# Patient Record
Sex: FEMALE | Race: WHITE | NOT HISPANIC OR LATINO | Employment: OTHER | URBAN - METROPOLITAN AREA
[De-identification: names, ages, dates, MRNs, and addresses within clinical notes are randomized per-mention and may not be internally consistent; named-entity substitution may affect disease eponyms.]

---

## 2017-01-17 ENCOUNTER — OFFICE VISIT (OUTPATIENT)
Dept: FAMILY MEDICINE | Facility: CLINIC | Age: 77
End: 2017-01-17
Payer: COMMERCIAL

## 2017-01-17 VITALS
HEART RATE: 96 BPM | DIASTOLIC BLOOD PRESSURE: 80 MMHG | TEMPERATURE: 96.8 F | OXYGEN SATURATION: 97 % | BODY MASS INDEX: 28.98 KG/M2 | WEIGHT: 165 LBS | SYSTOLIC BLOOD PRESSURE: 138 MMHG

## 2017-01-17 DIAGNOSIS — R32 UNSPECIFIED URINARY INCONTINENCE: Primary | ICD-10-CM

## 2017-01-17 DIAGNOSIS — R30.0 DYSURIA: ICD-10-CM

## 2017-01-17 DIAGNOSIS — R82.90 NONSPECIFIC FINDING ON EXAMINATION OF URINE: ICD-10-CM

## 2017-01-17 LAB
ALBUMIN UR-MCNC: NEGATIVE MG/DL
APPEARANCE UR: CLEAR
BACTERIA #/AREA URNS HPF: ABNORMAL /HPF
BILIRUB UR QL STRIP: NEGATIVE
COLOR UR AUTO: YELLOW
GLUCOSE UR STRIP-MCNC: NEGATIVE MG/DL
HGB UR QL STRIP: NEGATIVE
KETONES UR STRIP-MCNC: NEGATIVE MG/DL
LEUKOCYTE ESTERASE UR QL STRIP: NEGATIVE
NITRATE UR QL: POSITIVE
PH UR STRIP: 6.5 PH (ref 5–7)
RBC #/AREA URNS AUTO: ABNORMAL /HPF (ref 0–2)
SP GR UR STRIP: 1.01 (ref 1–1.03)
URN SPEC COLLECT METH UR: ABNORMAL
URNS CMNT MICRO: ABNORMAL
UROBILINOGEN UR STRIP-ACNC: 0.2 EU/DL (ref 0.2–1)
WBC #/AREA URNS AUTO: ABNORMAL /HPF (ref 0–2)

## 2017-01-17 PROCEDURE — 87088 URINE BACTERIA CULTURE: CPT | Performed by: PHYSICIAN ASSISTANT

## 2017-01-17 PROCEDURE — 99213 OFFICE O/P EST LOW 20 MIN: CPT | Performed by: PHYSICIAN ASSISTANT

## 2017-01-17 PROCEDURE — 87086 URINE CULTURE/COLONY COUNT: CPT | Performed by: PHYSICIAN ASSISTANT

## 2017-01-17 PROCEDURE — 81001 URINALYSIS AUTO W/SCOPE: CPT | Performed by: PHYSICIAN ASSISTANT

## 2017-01-17 PROCEDURE — 87186 SC STD MICRODIL/AGAR DIL: CPT | Performed by: PHYSICIAN ASSISTANT

## 2017-01-17 RX ORDER — SULFAMETHOXAZOLE/TRIMETHOPRIM 800-160 MG
1 TABLET ORAL 2 TIMES DAILY
Qty: 6 TABLET | Refills: 0 | Status: SHIPPED | OUTPATIENT
Start: 2017-01-17 | End: 2017-01-20

## 2017-01-17 NOTE — MR AVS SNAPSHOT
After Visit Summary   1/17/2017    Claire Moore    MRN: [id number]           Patient Information     Date Of Birth          1940        Visit Information        Provider Department      1/17/2017 9:50 AM Kehr, Kristen M, PA-C [facility]        Today's Diagnoses     Dysuria    -  1     Nonspecific finding on examination of urine         Female bladder prolapse            Follow-ups after your visit        Additional Services     UROLOGY ADULT REFERRAL       Your provider has referred you to: [facility]: [facility] [place] [phone]   [email or web address]    Please be aware that coverage of these services is subject to the terms and limitations of your health insurance plan.  Call member services at your health plan with any benefit or coverage questions.      Please bring the following with you to your appointment:    (1) Any X-Rays, CTs or MRIs which have been performed.  Contact the facility where they were done to arrange for  prior to your scheduled appointment.    (2) List of current medications  (3) This referral request   (4) Any documents/labs given to you for this referral                  Your next 10 appointments already scheduled     Jun 05, 2017  9:00 AM   New Visit with Shemar Jesus MD   [facility]) [address] [address] [phone]              Who to contact     If you have questions or need follow up information about today's clinic visit or your schedule please contact [facility] directly at [phone].  Normal or non-critical lab and imaging results will be communicated to you by MyChart, letter or phone within 4 business days after the clinic has received the results. If you do not hear from us within 7 days, please contact the clinic through MyChart or phone. If you have a critical or abnormal lab result, we will notify you by phone as  "soon as possible.  Submit refill requests through [employer] or call your pharmacy and they will forward the refill request to us. Please allow 3 business days for your refill to be completed.          Additional Information About Your Visit        [employer] Information     [employer] lets you send messages to your doctor, view your test results, renew your prescriptions, schedule appointments and more. To sign up, go to [email or web address] . Click on \"Log in\" on the left side of the screen, which will take you to the Welcome page. Then click on \"Sign up Now\" on the right side of the page.     You will be asked to enter the access code listed below, as well as some personal information. Please follow the directions to create your username and password.     Your access code is: [email or web address]  Expires: 2017 10:07 AM     Your access code will  in 90 days. If you need help or a new code, please call your [facility] clinic or [phone].        Care EveryWhere ID     This is your Care EveryWhere ID. This could be used by other organizations to access your [facility] medical records  [id number]        Your Vitals Were     Pulse Temperature Pulse Oximetry             96 96.8  F (36  C) (Oral) 97%          Blood Pressure from Last 3 Encounters:   17 138/80   16 134/82   06/10/15 156/80    Weight from Last 3 Encounters:   17 165 lb (74.844 kg)   16 165 lb (74.844 kg)   06/10/15 158 lb (71.668 kg)              We Performed the Following     UA reflex to Microscopic and Culture     Urine Culture Aerobic Bacterial     Urine Microscopic     UROLOGY ADULT REFERRAL          Today's Medication Changes          These changes are accurate as of: 17 10:07 AM.  If you have any questions, ask your nurse or doctor.               Start taking these medicines.        Dose/Directions    sulfamethoxazole-trimethoprim 800-160 MG per tablet   Commonly known as:  BACTRIM DS/SEPTRA DS   Used for:  Dysuria "   Started by:  Kehr, Kristen M, PA-C        Dose:  1 tablet   Take 1 tablet by mouth 2 times daily for 3 days   Quantity:  6 tablet   Refills:  0            Where to get your medicines      These medications were sent to [id number]  [place] - [address]  [address], [place] [address]     Phone:  [phone]    - sulfamethoxazole-trimethoprim 800-160 MG per tablet             Primary Care Provider Office Phone # Fax #    Kristen M Kehr, PA-C [phone]       [facility] [address] [facility] [address]        Thank you!     Thank you for choosing [facility]  for your care. Our goal is always to provide you with excellent care. Hearing back from our patients is one way we can continue to improve our services. Please take a few minutes to complete the written survey that you may receive in the mail after your visit with us. Thank you!             Your Updated Medication List - Protect others around you: Learn how to safely use, store and throw away your medicines at www.disposemymeds.org.          This list is accurate as of: 1/17/17 10:07 AM.  Always use your most recent med list.                   Brand Name Dispense Instructions for use    aspirin 81 MG tablet      Take  by mouth daily.       CALCIUM 500 PO      1 per day       cholecalciferol 1000 UNIT tablet    vitamin D     Take 1 tablet by mouth daily.       levothyroxine 75 MCG tablet    SYNTHROID    90 tablet    Take 1 tablet (75 mcg) by mouth daily       lisinopril-hydrochlorothiazide 20-25 MG per tablet    PRINZIDE/ZESTORETIC    90 tablet    Take 0.5 tablets by mouth daily APPT NEEDED FOR FURTHER REFILLS       MULTI-DAY VITAMINS PO      Take  by mouth. 1 tab daily       simvastatin 20 MG tablet    ZOCOR    90 tablet    Take 1 tablet (20 mg) by mouth daily       sulfamethoxazole-trimethoprim 800-160 MG per tablet    BACTRIM DS/SEPTRA DS    6 tablet    Take 1 tablet by mouth 2  times daily for 3 days

## 2017-01-17 NOTE — NURSING NOTE
"Chief Complaint   Patient presents with     Bladder Problems       Initial /99 mmHg  Pulse 96  Temp(Src) 96.8  F (36  C) (Oral)  Wt 165 lb (74.844 kg)  SpO2 97% Estimated body mass index is 28.98 kg/(m^2) as calculated from the following:    Height as of 5/18/16: 5' 3.25\" (1.607 m).    Weight as of this encounter: 165 lb (74.844 kg)..  BP completed using cuff size: emeterio SILVA MA    "

## 2017-01-17 NOTE — PROGRESS NOTES
SUBJECTIVE:                                                    Claire Moore is a 76 year old female who presents to clinic today for the following health issues:    Claire has been noticing changes with her bladder. She feels a pressure when she is shoveling and having urinary leakage. She denies painful urination. She feels this has been ongoing for years, but getting worse.   She actually thinks she feels her bladder prolapsed after lifting.         Problem list and histories reviewed & adjusted, as indicated.  Additional history: as documented    Patient Active Problem List   Diagnosis     Hypothyroidism     Hypertension goal BP (blood pressure) < 140/90     Hyperlipidemia LDL goal <130     Advanced directives, counseling/discussion     DJD (degenerative joint disease)     Pseudophakia, od     Hypothyroidism, unspecified hypothyroidism type     Blind hypotensive eye, left     History of retinal detachment, os     Posterior vitreous detachment, right     Past Surgical History   Procedure Laterality Date     Tonsillectomy       Retinal reattachment  remotely     right eye     Phacoemulsification with standard intraocular lens implant  5/2015     right eye     Cataract iol, rt/lt         Social History   Substance Use Topics     Smoking status: Former Smoker     Types: Cigarettes     Smokeless tobacco: Never Used     Alcohol Use: Yes      Comment: occ     Family History   Problem Relation Age of Onset     Alzheimer Disease Mother      OSTEOPOROSIS Mother      Hypertension Mother      Prostate Cancer Father      OSTEOPOROSIS Maternal Grandmother      CANCER Brother      Eye Disorder Brother      Eye Disorder Sister      Eye Disorder Son      Eye Disorder Brother      Eye Disorder Brother      CEREBROVASCULAR DISEASE Paternal Grandmother      DIABETES No family hx of      Thyroid Disease No family hx of      Glaucoma No family hx of      Macular Degeneration No family hx of          No Known  Allergies    ROS:  Constitutional, HEENT, cardiovascular, pulmonary, gi and gu systems are negative, except as otherwise noted.    OBJECTIVE:                                                    /80 mmHg  Pulse 96  Temp(Src) 96.8  F (36  C) (Oral)  Wt 165 lb (74.844 kg)  SpO2 97%  Body mass index is 28.98 kg/(m^2).  GENERAL: healthy, alert and no distress  PSYCH: mentation appears normal, affect normal/bright    Diagnostic Test Results:  Results for orders placed or performed in visit on 01/17/17 (from the past 24 hour(s))   UA reflex to Microscopic and Culture   Result Value Ref Range    Color Urine Yellow     Appearance Urine Clear     Glucose Urine Negative NEG mg/dL    Bilirubin Urine Negative NEG    Ketones Urine Negative NEG mg/dL    Specific Gravity Urine 1.010 1.003 - 1.035    Blood Urine Negative NEG    pH Urine 6.5 5.0 - 7.0 pH    Protein Albumin Urine Negative NEG mg/dL    Urobilinogen Urine 0.2 0.2 - 1.0 EU/dL    Nitrite Urine Positive (A) NEG    Leukocyte Esterase Urine Negative NEG    Source Midstream Urine    Urine Microscopic   Result Value Ref Range    WBC Urine O - 2 0 - 2 /HPF    RBC Urine O - 2 0 - 2 /HPF    Bacteria Urine Many (A) NEG /HPF    Comment Urine       Urine was tested unconcentrated because <10 ml was received.        ASSESSMENT/PLAN:                                                      1. Unspecified urinary incontinence  2. Dysuria  UA is positive today. Treat with Bactrim DS x 3 days. Culture also pending.   Ongoing issues with her bladder, she most likely needs consultation for a sling procedure in the future.   Referral given.   - UA reflex to Microscopic and Culture  - Urine Microscopic  - sulfamethoxazole-trimethoprim (BACTRIM DS/SEPTRA DS) 800-160 MG per tablet; Take 1 tablet by mouth 2 times daily for 3 days  Dispense: 6 tablet; Refill: 0  - UROLOGY ADULT REFERRAL    3. Nonspecific finding on examination of urine  - Urine Culture Aerobic Bacterial    Kristen M. Kehr,  JAMIE  [facility]

## 2017-01-17 NOTE — Clinical Note
[facility]  [address] [facility] [address] [phone]        January 23, 2017    Claire Moore  [address]  [address] [place] [address]            Dear Claire,      Your urine tests showed a bacteria that should be covered by the antibiotic that was chosen. Please see your provider if your symptoms persist.        If you have any questions or concerns, please call myself or my nurse at [phone].    Sincerely,    Kristen Kehr, PA-C/ms    Results for orders placed or performed in visit on 01/17/17   UA reflex to Microscopic and Culture   Result Value Ref Range    Color Urine Yellow     Appearance Urine Clear     Glucose Urine Negative NEG mg/dL    Bilirubin Urine Negative NEG    Ketones Urine Negative NEG mg/dL    Specific Gravity Urine 1.010 1.003 - 1.035    Blood Urine Negative NEG    pH Urine 6.5 5.0 - 7.0 pH    Protein Albumin Urine Negative NEG mg/dL    Urobilinogen Urine 0.2 0.2 - 1.0 EU/dL    Nitrite Urine Positive (A) NEG    Leukocyte Esterase Urine Negative NEG    Source Midstream Urine    Urine Microscopic   Result Value Ref Range    WBC Urine O - 2 0 - 2 /HPF    RBC Urine O - 2 0 - 2 /HPF    Bacteria Urine Many (A) NEG /HPF    Comment Urine       Urine was tested unconcentrated because <10 ml was received.   Urine Culture Aerobic Bacterial   Result Value Ref Range    Specimen Description Midstream Urine     Culture Micro >100,000 colonies/mL Escherichia coli (A)     Micro Report Status FINAL 01/22/2017     Organism: >100,000 colonies/mL Escherichia coli        Susceptibility    >100,000 colonies/ml escherichia coli (gurvinder) -  (no method available)     AMPICILLIN >32.0 Resistant  ug/mL     CEFAZOLIN Value in next row  ug/mL      8 SusceptibleCefazolin GURVINDER breakpoints are for the treatment of uncomplicated urinary tract infections.  For the treatment of systemic infections, please contact the laboratory for additional testing.     CEFOXITIN Value in next row  ug/mL       8 SusceptibleCefazolin RAQEUL breakpoints are for the treatment of uncomplicated urinary tract infections.  For the treatment of systemic infections, please contact the laboratory for additional testing.     CEFTAZIDIME Value in next row  ug/mL      8 SusceptibleCefazolin RAQUEL breakpoints are for the treatment of uncomplicated urinary tract infections.  For the treatment of systemic infections, please contact the laboratory for additional testing.     CEFTRIAXONE Value in next row  ug/mL      8 SusceptibleCefazolin RAQUEL breakpoints are for the treatment of uncomplicated urinary tract infections.  For the treatment of systemic infections, please contact the laboratory for additional testing.     CIPROFLOXACIN Value in next row  ug/mL      8 SusceptibleCefazolin RAQUEL breakpoints are for the treatment of uncomplicated urinary tract infections.  For the treatment of systemic infections, please contact the laboratory for additional testing.     GENTAMICIN Value in next row  ug/mL      8 SusceptibleCefazolin RAQUEL breakpoints are for the treatment of uncomplicated urinary tract infections.  For the treatment of systemic infections, please contact the laboratory for additional testing.     LEVOFLOXACIN Value in next row  ug/mL      8 SusceptibleCefazolin RAQUEL breakpoints are for the treatment of uncomplicated urinary tract infections.  For the treatment of systemic infections, please contact the laboratory for additional testing.     NITROFURANTOIN Value in next row  ug/mL      8 SusceptibleCefazolin RAQUEL breakpoints are for the treatment of uncomplicated urinary tract infections.  For the treatment of systemic infections, please contact the laboratory for additional testing.     TOBRAMYCIN Value in next row  ug/mL      8 SusceptibleCefazolin RAQUEL breakpoints are for the treatment of uncomplicated urinary tract infections.  For the treatment of systemic infections, please contact the laboratory for additional testing.      Trimethoprim/Sulfa Value in next row  ug/mL      8 SusceptibleCefazolin RAQUEL breakpoints are for the treatment of uncomplicated urinary tract infections.  For the treatment of systemic infections, please contact the laboratory for additional testing.     AMPICILLIN/SULBACTAM Value in next row  ug/mL      8 SusceptibleCefazolin RAQUEL breakpoints are for the treatment of uncomplicated urinary tract infections.  For the treatment of systemic infections, please contact the laboratory for additional testing.     Piperacillin/Tazo Value in next row  ug/mL      8 SusceptibleCefazolin RAQUEL breakpoints are for the treatment of uncomplicated urinary tract infections.  For the treatment of systemic infections, please contact the laboratory for additional testing.     CEFEPIME Value in next row  ug/mL      8 SusceptibleCefazolin RAQUEL breakpoints are for the treatment of uncomplicated urinary tract infections.  For the treatment of systemic infections, please contact the laboratory for additional testing.

## 2017-01-22 LAB
BACTERIA SPEC CULT: ABNORMAL
MICRO REPORT STATUS: ABNORMAL
MICROORGANISM SPEC CULT: ABNORMAL
SPECIMEN SOURCE: ABNORMAL

## 2017-01-24 ENCOUNTER — OFFICE VISIT (OUTPATIENT)
Dept: UROLOGY | Facility: CLINIC | Age: 77
End: 2017-01-24
Payer: COMMERCIAL

## 2017-01-24 VITALS — HEART RATE: 82 BPM | DIASTOLIC BLOOD PRESSURE: 98 MMHG | RESPIRATION RATE: 12 BRPM | SYSTOLIC BLOOD PRESSURE: 180 MMHG

## 2017-01-24 DIAGNOSIS — I10 HYPERTENSION GOAL BP (BLOOD PRESSURE) < 140/90: ICD-10-CM

## 2017-01-24 DIAGNOSIS — R32 URINARY INCONTINENCE, UNSPECIFIED TYPE: ICD-10-CM

## 2017-01-24 DIAGNOSIS — N81.3 COMPLETE UTERINE PROLAPSE: Primary | ICD-10-CM

## 2017-01-24 LAB
ALBUMIN UR-MCNC: NEGATIVE MG/DL
APPEARANCE UR: CLEAR
BILIRUB UR QL STRIP: NEGATIVE
COLOR UR AUTO: YELLOW
GLUCOSE UR STRIP-MCNC: NEGATIVE MG/DL
HGB UR QL STRIP: ABNORMAL
KETONES UR STRIP-MCNC: NEGATIVE MG/DL
LEUKOCYTE ESTERASE UR QL STRIP: ABNORMAL
NITRATE UR QL: NEGATIVE
NON-SQ EPI CELLS #/AREA URNS LPF: ABNORMAL /LPF
PH UR STRIP: 7 PH (ref 5–7)
RBC #/AREA URNS AUTO: ABNORMAL /HPF (ref 0–2)
SP GR UR STRIP: 1.01 (ref 1–1.03)
URN SPEC COLLECT METH UR: ABNORMAL
UROBILINOGEN UR STRIP-ACNC: 0.2 EU/DL (ref 0.2–1)
WBC #/AREA URNS AUTO: ABNORMAL /HPF (ref 0–2)

## 2017-01-24 PROCEDURE — 99204 OFFICE O/P NEW MOD 45 MIN: CPT | Mod: 25 | Performed by: UROLOGY

## 2017-01-24 PROCEDURE — 81001 URINALYSIS AUTO W/SCOPE: CPT | Performed by: UROLOGY

## 2017-01-24 PROCEDURE — 51798 US URINE CAPACITY MEASURE: CPT | Performed by: UROLOGY

## 2017-01-24 NOTE — PATIENT INSTRUCTIONS
Your next cystoscopy is scheduled 02/14/2017 @ 2:00pm Please call  if you need to reschedule this appointment.    Cystoscopy  Cystoscopy is a procedure that lets your doctor look directly inside your urethra and bladder. It can be used to:    Help diagnose a problem with your urethra, bladder, or kidneys.    Take a sample (biopsy) of bladder or urethral tissue.    Treat certain problems (such as removing kidney stones).    Place a stent to bypass an obstruction.    Take special X-rays of the kidneys.  Based on the findings, your doctor may recommend other tests or treatments.  What is a cystoscope?  A cystoscope is a telescope-like instrument that contains lenses and fiberoptics (small glass wires that make bright light). The cystoscope may be straight and rigid, or flexible to bend around curves in the urethra. The doctor may look directly into the cystoscope, or project the image onto a monitor.  Getting ready    Ask your doctor if you should stop taking any medications prior to the procedure.    Ask whether you should avoid eating or drinking anything after midnight before the procedure.    Follow any other instructions your doctor gives you.  Tell your doctor before the exam if you:    Take any medications, such as aspirin or blood thinners    Have allergies to any medications    Are pregnant   The procedure  Cystoscopy is done in the doctor s office or hospital. The doctor and a nurse are present during the procedure. It takes only a few minutes, longer if a biopsy, X-ray, or treatment needs to be done.  During the procedure:    You lie on an exam table on your back, knees bent and legs apart. You are covered with a drape.    Your urethra and the area around it are washed. Anesthetic jelly may be applied to numb the urethra. Other pain medication is usually not needed. In some cases, you may be offered a mild sedative to help you relax. If a more extensive procedure is to be done, such as a biopsy  or kidney stone removal, general anesthesia may be needed.    The cystoscope is inserted. A sterile fluid is put into the bladder to expand it. You may feel pressure from this fluid.    When the procedure is done, the cystoscope is removed.  After the procedure  If you had a sedative, general anesthesia, or spinal anesthesia, you must have someone drive you home. Once you re home:    Drink plenty of fluids.    You may have burning or light bleeding when you urinate--this is normal.    Medications may be prescribed to ease any discomfort or prevent infection. Take these as directed.    Call your doctor if you have heavy bleeding or blood clots, burning that lasts more than a day, a fever over 100 F  (38  C), or trouble urinating.    1082-9246 [employer] [address] [place] [address], [place] [address]. All rights reserved. This information is not intended as a substitute for professional medical care. Always follow your healthcare professional's instructions.

## 2017-01-24 NOTE — MR AVS SNAPSHOT
After Visit Summary   1/24/2017    Claire Moore    MRN: [id number]           Patient Information     Date Of Birth          1940        Visit Information        Provider Department      1/24/2017 3:45 PM Rene Mitchell MD [facility]        Today's Diagnoses     Complete uterine prolapse    -  1     Urinary incontinence, unspecified type         Hypertension goal BP (blood pressure) < 140/90           Care Instructions    Your next cystoscopy is scheduled 02/14/2017 @ 2:00pm Please call  if you need to reschedule this appointment.    Cystoscopy  Cystoscopy is a procedure that lets your doctor look directly inside your urethra and bladder. It can be used to:    Help diagnose a problem with your urethra, bladder, or kidneys.    Take a sample (biopsy) of bladder or urethral tissue.    Treat certain problems (such as removing kidney stones).    Place a stent to bypass an obstruction.    Take special X-rays of the kidneys.  Based on the findings, your doctor may recommend other tests or treatments.  What is a cystoscope?  A cystoscope is a telescope-like instrument that contains lenses and fiberoptics (small glass wires that make bright light). The cystoscope may be straight and rigid, or flexible to bend around curves in the urethra. The doctor may look directly into the cystoscope, or project the image onto a monitor.  Getting ready    Ask your doctor if you should stop taking any medications prior to the procedure.    Ask whether you should avoid eating or drinking anything after midnight before the procedure.    Follow any other instructions your doctor gives you.  Tell your doctor before the exam if you:    Take any medications, such as aspirin or blood thinners    Have allergies to any medications    Are pregnant   The procedure  Cystoscopy is done in the doctor s office or hospital. The doctor and a nurse are present during the procedure. It takes only a few  minutes, longer if a biopsy, X-ray, or treatment needs to be done.  During the procedure:    You lie on an exam table on your back, knees bent and legs apart. You are covered with a drape.    Your urethra and the area around it are washed. Anesthetic jelly may be applied to numb the urethra. Other pain medication is usually not needed. In some cases, you may be offered a mild sedative to help you relax. If a more extensive procedure is to be done, such as a biopsy or kidney stone removal, general anesthesia may be needed.    The cystoscope is inserted. A sterile fluid is put into the bladder to expand it. You may feel pressure from this fluid.    When the procedure is done, the cystoscope is removed.  After the procedure  If you had a sedative, general anesthesia, or spinal anesthesia, you must have someone drive you home. Once you re home:    Drink plenty of fluids.    You may have burning or light bleeding when you urinate--this is normal.    Medications may be prescribed to ease any discomfort or prevent infection. Take these as directed.    Call your doctor if you have heavy bleeding or blood clots, burning that lasts more than a day, a fever over 100 F  (38  C), or trouble urinating.    1632-3931 [employer]. [address] [place] [address], [place] [address]. All rights reserved. This information is not intended as a substitute for professional medical care. Always follow your healthcare professional's instructions.              Follow-ups after your visit        Your next 10 appointments already scheduled     Feb 14, 2017  2:00 PM   Return Visit with Rene Mitchell MD, [place] CYSTO PROC ROOM   [facility] [place] [facility]    [address]  [place] [address]   [phone]            Jun 05, 2017  9:00 AM   New Visit with Shemar Jesus MD   [facility] [place] ([facility])    [address]  [place] [address]   [phone]          "     Who to contact     If you have questions or need follow up information about today's clinic visit or your schedule please contact [facility] directly at [phone].  Normal or non-critical lab and imaging results will be communicated to you by MyChart, letter or phone within 4 business days after the clinic has received the results. If you do not hear from us within 7 days, please contact the clinic through MyChart or phone. If you have a critical or abnormal lab result, we will notify you by phone as soon as possible.  Submit refill requests through [employer] or call your pharmacy and they will forward the refill request to us. Please allow 3 business days for your refill to be completed.          Additional Information About Your Visit        [employer] [facility] Information     [employer] lets you send messages to your doctor, view your test results, renew your prescriptions, schedule appointments and more. To sign up, go to [email or web address] . Click on \"Log in\" on the left side of the screen, which will take you to the Welcome page. Then click on \"Sign up Now\" on the right side of the page.     You will be asked to enter the access code listed below, as well as some personal information. Please follow the directions to create your username and password.     Your access code is: [email or web address]  Expires: 2017 10:07 AM     Your access code will  in 90 days. If you need help or a new code, please call your [place] clinic or [phone].        Care EveryWhere ID     This is your Care EveryWhere ID. This could be used by other organizations to access your [place] medical records  [id number]        Your Vitals Were     Pulse Respirations                82 12           Blood Pressure from Last 3 Encounters:   17 180/98   17 138/80   16 134/82    Weight from Last 3 Encounters:   17 74.844 kg (165 lb)   16 74.844 kg (165 lb)   06/10/15 71.668 kg (158 lb)            "   We Performed the Following     Residual Urine By Bladder Scan     UA reflex to Microscopic and Culture     Urine Microscopic        Primary Care Provider Office Phone # Fax #    Kristen M Kehr, PA-C [phone]       [facility] [address] [address] [address]        Thank you!     Thank you for choosing [facility]  for your care. Our goal is always to provide you with excellent care. Hearing back from our patients is one way we can continue to improve our services. Please take a few minutes to complete the written survey that you may receive in the mail after your visit with us. Thank you!             Your Updated Medication List - Protect others around you: Learn how to safely use, store and throw away your medicines at www.disposemymeds.org.          This list is accurate as of: 1/24/17  4:10 PM.  Always use your most recent med list.                   Brand Name Dispense Instructions for use    aspirin 81 MG tablet      Take  by mouth daily.       CALCIUM 500 PO      1 per day       cholecalciferol 1000 UNIT tablet    vitamin D     Take 1 tablet by mouth daily.       levothyroxine 75 MCG tablet    SYNTHROID    90 tablet    Take 1 tablet (75 mcg) by mouth daily       lisinopril-hydrochlorothiazide 20-25 MG per tablet    PRINZIDE/ZESTORETIC    90 tablet    Take 0.5 tablets by mouth daily APPT NEEDED FOR FURTHER REFILLS       MULTI-DAY VITAMINS PO      Take  by mouth. 1 tab daily       simvastatin 20 MG tablet    ZOCOR    90 tablet    Take 1 tablet (20 mg) by mouth daily

## 2017-01-24 NOTE — PROGRESS NOTES
S: See dictated note   Current Outpatient Prescriptions   Medication Sig Dispense Refill     lisinopril-hydrochlorothiazide (PRINZIDE,ZESTORETIC) 20-25 MG per tablet Take 0.5 tablets by mouth daily APPT NEEDED FOR FURTHER REFILLS 90 tablet 1     levothyroxine (SYNTHROID) 75 MCG tablet Take 1 tablet (75 mcg) by mouth daily 90 tablet 3     simvastatin (ZOCOR) 20 MG tablet Take 1 tablet (20 mg) by mouth daily 90 tablet 3     aspirin 81 MG tablet Take  by mouth daily.       Multiple Vitamin (MULTI-DAY VITAMINS PO) Take  by mouth. 1 tab daily       cholecalciferol (VITAMIN D) 1000 UNIT tablet Take 1 tablet by mouth daily.       CALCIUM 500 OR 1 per day        No Known Allergies  Past Medical History   Diagnosis Date     Unspecified visual loss      L eye     Unspecified hypothyroidism      Pure hypercholesterolemia      Osteopenia      Hypertension      Cataract      Retinal detachment      right eye     DJD (degenerative joint disease) 8/14/2012     Past Surgical History   Procedure Laterality Date     Tonsillectomy       Retinal reattachment  remotely     right eye     Phacoemulsification with standard intraocular lens implant  5/2015     right eye     Cataract iol, rt/lt        Family History   Problem Relation Age of Onset     Alzheimer Disease Mother      OSTEOPOROSIS Mother      Hypertension Mother      Prostate Cancer Father      OSTEOPOROSIS Maternal Grandmother      CANCER Brother      Eye Disorder Brother      Eye Disorder Sister      Eye Disorder Son      Eye Disorder Brother      Eye Disorder Brother      CEREBROVASCULAR DISEASE Paternal Grandmother      DIABETES No family hx of      Thyroid Disease No family hx of      Glaucoma No family hx of      Macular Degeneration No family hx of      Social History     Social History     Marital Status:      Spouse Name: N/A     Number of Children: N/A     Years of Education: N/A     Social History Main Topics     Smoking status: Former Smoker     Types:  Cigarettes     Smokeless tobacco: Never Used     Alcohol Use: Yes      Comment: occ     Drug Use: No     Sexual Activity: No     Other Topics Concern     None     Social History Narrative       REVIEW OF SYSTEMS  =================  C: NEGATIVE for fever, chills, change in weight  I: NEGATIVE for worrisome rashes, moles or lesions  E/M: NEGATIVE for ear, mouth and throat problems  R: NEGATIVE for significant cough or SHORTNESS OF BREATH  CV:  NEGATIVE for chest pain, palpitations or peripheral edema  GI: NEGATIVE for nausea, abdominal pain, heartburn, or change in bowel habits  NEURO: NEGATIVE numbness/weakness  : see HPI  PSYCH: NEGATIVE depression/anxiety  LYmph: no new enlarged lymph nodes  Ortho: no new trauma/movements      Physical Exam:  /98 mmHg  Pulse 82  Resp 12   Patient is pleasant, in no acute distress, good general condition.  HEENT:  Normalcephalic, atraumatic  Lung: no evidence of respiratory distress    Abdomen: Soft, nondistended, non tender. No masses. No rebound or guarding.   Exam: complete uterine prolapse.  Bladder scan 185 ml.  Skin: Warm and dry.  No redness.  Neuro: grossly normal  Psych normal mood and affect  Musculoskeletal  moving all extremities    Assessment/Plan:   See dictated note     HTN: Patient to follow up with Primary Care provider regarding elevated blood pressure.

## 2017-01-24 NOTE — PROGRESS NOTES
SUBJECTIVE:  Can Moore is a pleasant 76-year-old  female who was requested to be seen by Kristen Kehr for consultation with regard to the patient's urinary incontinence and uterine prolapse.  She has had some incontinence associated with exertion mainly.  For the last month or two she notices worsening of her prolapses.  It is not completely out and when she is in the bathroom she has to push it in from time to time.  She has no fever, nausea or vomiting, no significant bowel problems.  She is not sexually active.      ASSESSMENT:  Pleasant 76-year-old  female with urinary incontinence and complete uterine prolapse.      RECOMMENDATION:  I discussed treatment options with the patient at length including observation versus pessary usage versus surgical interventions.  We will schedule the patient for cystoscopy.  She is interested in having surgery done.  Since she is not sexually active, colpocleisis would be a good option for patient.         KATYA BENJAMIN MD             D: 2017 16:12   T: 2017 16:48   MT: chalo      Name:     CAN MOORE   MRN:      [id number]        Account:      [id number]   :      1940           Visit Date:   2017      Document: [id number]       cc: Kristen Kehr PA

## 2017-01-24 NOTE — NURSING NOTE
"Chief Complaint   Patient presents with     Consult       Initial /98 mmHg  Pulse 82  Resp 12 Estimated body mass index is 28.98 kg/(m^2) as calculated from the following:    Height as of 5/18/16: 1.607 m (5' 3.25\").    Weight as of 1/17/17: 74.844 kg (165 lb).  BP completed using cuff size: regular    Tanvi Burr RN     Bladder Scan performed. 185 maximum residual urine detected after 3 scans. MD informed     "

## 2017-02-14 ENCOUNTER — OFFICE VISIT (OUTPATIENT)
Dept: UROLOGY | Facility: CLINIC | Age: 77
End: 2017-02-14
Payer: COMMERCIAL

## 2017-02-14 VITALS — SYSTOLIC BLOOD PRESSURE: 184 MMHG | OXYGEN SATURATION: 97 % | DIASTOLIC BLOOD PRESSURE: 88 MMHG | HEART RATE: 90 BPM

## 2017-02-14 DIAGNOSIS — R32 URINARY INCONTINENCE, UNSPECIFIED TYPE: ICD-10-CM

## 2017-02-14 DIAGNOSIS — N81.3 COMPLETE UTERINE PROLAPSE: Primary | ICD-10-CM

## 2017-02-14 DIAGNOSIS — I10 HYPERTENSION GOAL BP (BLOOD PRESSURE) < 140/90: ICD-10-CM

## 2017-02-14 PROCEDURE — 99207 ZZC DROP WITH A PROCEDURE: CPT | Mod: 25 | Performed by: UROLOGY

## 2017-02-14 PROCEDURE — 52000 CYSTOURETHROSCOPY: CPT | Performed by: UROLOGY

## 2017-02-14 NOTE — PROGRESS NOTES
SUBJECTIVE:  Can Moore returns to clinic today for a cystoscopy because of pelvic organ prolapse.      PROCEDURE:  The patient was brought to the cystoscopy suite and placed in dorsal lithotomy position.  She was draped and prepped in the usual surgical fashion.  A flexible cystoscope was then placed into the bladder.  Urethra was unremarkable.  Bladder showed grade I trabeculation.  There was no stone or tumor identified.      ASSESSMENT:  Complete pelvic organ prolapse.      RECOMMENDATION:  I discussed the treatment options with the patient at length.  These include but are not limited to observation, usage of pessary, surgical interventions.  She is interested in having colpocleisis since she is not sexually active anymore.  I described the procedure at length with the patient.  There is a small risk of recurrence and also there is a small risk of incontinence after pelvic organ prolapse repair.  I will also do a mid urethral sling to hopefully prevent incontinence afterwards.  I will schedule this elective procedure in the near future.         KATYA BENJAMIN MD             D: 2017 14:36   T: 2017 14:55   MT: IRENE      Name:     CAN MOORE   MRN:      [id number]        Account:      [id number]   :      1940           Visit Date:   2017      Document: [id number]       cc: Kristen Kehr PA

## 2017-02-14 NOTE — PATIENT INSTRUCTIONS
Stress Urinary Incontinence: Having Midurethral Sling Surgery  To help treat stress urinary incontinence (JOSEFA), your surgeon may do midurethral sling surgery. A  sling  of tissue is placed under the urethra. The sling (tape) is made from a mesh of artificial material. When the tension of the tape is changed, urine should no longer leak. Your surgery will take about 60 minutes. You will be asked to do some things at home to get ready for surgery. Below are guidelines to help you get ready. If you have any questions, call your nurse or doctor.  The weeks before surgery    Have any tests that your doctor orders.    Tell your doctor about aspirin and other medicines, vitamins, or herbs you take. Ask if you should stop taking them before surgery.    Stop smoking to help lower your risks during surgery.    If you have been given any prescriptions to fill, do this before surgery.  The night before surgery    You may be asked to give yourself an enema. This cleans out your bowels for surgery. You ll be told how to do it.    Do not eat, drink, or chew anything after the midnight before surgery, as instructed. This includes water and chewing gum. But if you ve been told to take any medicines, swallow them with small sips of water.  The day of surgery  Arrive at the hospital a few hours before surgery as directed. Have someone drive you there who can also stay during the surgery, and drive you home. At the hospital, the healthcare staff will take your temperature and blood pressure. In some cases, you may have tests. Then the healthcare staff will put in one or more IV (intravenous) lines. These lines give you fluids and medicines before, during, and after surgery. Some of your pubic hair may be removed. The staff may put tight stockings on your legs to help prevent blood clots.  About anesthesia  To keep you pain-free during surgery, you ll get anesthesia. General anesthesia lets you sleep during the surgery. Local  anesthesia numbs the area that will be operated on. Before surgery, you ll meet with the anesthesiologist or nurse anesthetist. He or she can tell you what kind of anesthesia you will get and answer questions you may have.  During the procedure      The surgeon will make 2 small cuts (incisions) in the lower part of your belly (abdomen), near the pubic hairline. He or she will make another small incision in the front wall of the vagina.    The surgeon will work through the incisions to place the tape like a hammock under the urethra. The two ends of the tape emerge through the abdominal incisions.    If you re given local anesthesia, your surgeon may tell you to cough so that the tension of the tape can be changed.    When the tension is changed, the ends of the tape are cut. They stay below the skin in the tissue of the abdominal wall. The healing process of the incisions holds the ends of the tape in place.    The surgeon will close the incisions in the abdomen and vagina with stitches (sutures).  Risks and complications  The risks and complications of this procedure may include:    Infection    Bleeding    Risks of anesthesia    Blood clots    Damage to nerves, muscles, bladder, or nearby pelvic structures    Difficulty urinating    Urinary urgency    Problems with sling (tape)     6693-2952 [employer]. [address] [place] [address], [place] [address]. All rights reserved. This information is not intended as a substitute for professional medical care. Always follow your healthcare professional's instructions.

## 2017-02-14 NOTE — NURSING NOTE
"Chief Complaint   Patient presents with     Cystoscopy       Initial /88 (Cuff Size: Adult Large)  Pulse 90  SpO2 97% Estimated body mass index is 29 kg/(m^2) as calculated from the following:    Height as of 5/18/16: 1.607 m (5' 3.25\").    Weight as of 1/17/17: 74.8 kg (165 lb).  Medication Reconciliation: complete   Hiwot Gordon, YASSINE      "

## 2017-02-14 NOTE — PROGRESS NOTES
The patient is here for cystoscopy for evaluation of incontinence and pelvic floor prolapse.     Patient is prepped and draped.  Flexible cystoscope placed under direct vision.      Cysto: The anterior urethra is normal     In the bladder there is trabeculation grade 1.    Assessment/Plan:  (N81.3) Complete uterine prolapse  (primary encounter diagnosis)  Comment:    Plan: See dictated note   (R32) Urinary incontinence, unspecified type  Comment:    Plan: See dictated note     (I10) Hypertension goal BP (blood pressure) < 140/90  Comment:    Plan: Patient to follow up with Primary Care provider regarding elevated blood pressure.

## 2017-03-20 ENCOUNTER — OFFICE VISIT (OUTPATIENT)
Dept: FAMILY MEDICINE | Facility: CLINIC | Age: 77
End: 2017-03-20
Payer: COMMERCIAL

## 2017-03-20 VITALS
HEART RATE: 86 BPM | BODY MASS INDEX: 29.71 KG/M2 | OXYGEN SATURATION: 98 % | TEMPERATURE: 97.8 F | DIASTOLIC BLOOD PRESSURE: 82 MMHG | HEIGHT: 64 IN | WEIGHT: 174 LBS | SYSTOLIC BLOOD PRESSURE: 138 MMHG

## 2017-03-20 DIAGNOSIS — R32 URINARY INCONTINENCE, UNSPECIFIED TYPE: ICD-10-CM

## 2017-03-20 DIAGNOSIS — I10 HYPERTENSION GOAL BP (BLOOD PRESSURE) < 140/90: ICD-10-CM

## 2017-03-20 DIAGNOSIS — Z01.818 PREOP GENERAL PHYSICAL EXAM: Primary | ICD-10-CM

## 2017-03-20 DIAGNOSIS — E03.8 OTHER SPECIFIED HYPOTHYROIDISM: ICD-10-CM

## 2017-03-20 DIAGNOSIS — E78.5 HYPERLIPIDEMIA LDL GOAL <130: ICD-10-CM

## 2017-03-20 LAB
HGB BLD-MCNC: 12.9 G/DL (ref 11.7–15.7)
POTASSIUM SERPL-SCNC: 3.9 MMOL/L (ref 3.4–5.3)

## 2017-03-20 PROCEDURE — 84132 ASSAY OF SERUM POTASSIUM: CPT | Performed by: PHYSICIAN ASSISTANT

## 2017-03-20 PROCEDURE — 36415 COLL VENOUS BLD VENIPUNCTURE: CPT | Performed by: PHYSICIAN ASSISTANT

## 2017-03-20 PROCEDURE — 99215 OFFICE O/P EST HI 40 MIN: CPT | Performed by: PHYSICIAN ASSISTANT

## 2017-03-20 PROCEDURE — 85018 HEMOGLOBIN: CPT | Performed by: PHYSICIAN ASSISTANT

## 2017-03-20 NOTE — LETTER
[facility]  [address] [address] [phone]        March 21, 2017    Claire Moore  [address]  [place] [address]            Dear Claire,    The results of your recent tests were normal.  Below is a copy of the results.  It was a pleasure to see you at your last appointment.    If you have any questions or concerns, please call myself or my nurse at [phone].    Sincerely,    Kristen Kehr, PA-C/hira    Results for orders placed or performed in visit on 03/20/17   Potassium   Result Value Ref Range    Potassium 3.9 3.4 - 5.3 mmol/L   Hemoglobin   Result Value Ref Range    Hemoglobin 12.9 11.7 - 15.7 g/dL

## 2017-03-20 NOTE — MR AVS SNAPSHOT
After Visit Summary   3/20/2017    Claire Moore    MRN: [id number]           Patient Information     Date Of Birth          1940        Visit Information        Provider Department      3/20/2017 9:20 AM Kehr, Kristen M, PA-C [facility]        Today's Diagnoses     Preop general physical exam    -  1    Urinary incontinence, unspecified type        Hypertension goal BP (blood pressure) < 140/90        Hyperlipidemia LDL goal <130        Other specified hypothyroidism          Care Instructions      Before Your Surgery      Call your surgeon if there is any change in your health. This includes signs of a cold or flu (such as a sore throat, runny nose, cough, rash or fever).    Do not smoke, drink alcohol or take over the counter medicine (unless your surgeon or primary care doctor tells you to) for the 24 hours before and after surgery.    If you take prescribed drugs: Follow your doctor s orders about which medicines to take and which to stop until after surgery.    Eating and drinking prior to surgery: follow the instructions from your surgeon    Take a shower or bath the night before surgery. Use the soap your surgeon gave you to gently clean your skin. If you do not have soap from your surgeon, use your regular soap. Do not shave or scrub the surgery site.  Wear clean pajamas and have clean sheets on your bed.         Follow-ups after your visit        Your next 10 appointments already scheduled     Mar 30, 2017   Procedure with Rene Mitchell MD   [facility]--)    [address] [place] [address]   [phone]            Jun 05, 2017  9:00 AM CDT   New Visit with Sheamr Jesus MD   [facility]    [address]  [place] [address]   [phone]              Who to contact     If you have questions or need follow up information about today's clinic visit or your schedule please contact  "[facility] [place] directly at [phone].  Normal or non-critical lab and imaging results will be communicated to you by MyChart, letter or phone within 4 business days after the clinic has received the results. If you do not hear from us within 7 days, please contact the clinic through [employer] or phone. If you have a critical or abnormal lab result, we will notify you by phone as soon as possible.  Submit refill requests through [employer] or call your pharmacy and they will forward the refill request to us. Please allow 3 business days for your refill to be completed.          Additional Information About Your Visit        [employer] Information     [employer] lets you send messages to your doctor, view your test results, renew your prescriptions, schedule appointments and more. To sign up, go to [email or web address] . Click on \"Log in\" on the left side of the screen, which will take you to the Welcome page. Then click on \"Sign up Now\" on the right side of the page.     You will be asked to enter the access code listed below, as well as some personal information. Please follow the directions to create your username and password.     Your access code is: [email or web address]  Expires: 2017 11:07 AM     Your access code will  in 90 days. If you need help or a new code, please call your [place] clinic or [phone].        Care EveryWhere ID     This is your Care EveryWhere ID. This could be used by other organizations to access your [place] medical records  [id number]        Your Vitals Were     Pulse Temperature Height Pulse Oximetry BMI (Body Mass Index)       86 97.8  F (36.6  C) (Oral) 5' 4\" (1.626 m) 98% 29.87 kg/m2        Blood Pressure from Last 3 Encounters:   17 (!) 175/106   17 184/88   17 (!) 180/98    Weight from Last 3 Encounters:   17 174 lb (78.9 kg)   17 165 lb (74.8 kg)   16 165 lb (74.8 kg)              We Performed the Following     EKG 12-lead " complete w/read - Clinics     Hemoglobin     Potassium        Primary Care Provider Office Phone # Fax #    Kristen M Kehr, PA-C [phone]       [facility] [address] [facility] [address]        Thank you!     Thank you for choosing [facility]  for your care. Our goal is always to provide you with excellent care. Hearing back from our patients is one way we can continue to improve our services. Please take a few minutes to complete the written survey that you may receive in the mail after your visit with us. Thank you!             Your Updated Medication List - Protect others around you: Learn how to safely use, store and throw away your medicines at www.disposemymeds.org.          This list is accurate as of: 3/20/17  9:51 AM.  Always use your most recent med list.                   Brand Name Dispense Instructions for use    aspirin 81 MG tablet      Take  by mouth daily.       CALCIUM 500 PO      1 per day       cholecalciferol 1000 UNIT tablet    vitamin D     Take 1 tablet by mouth daily.       levothyroxine 75 MCG tablet    SYNTHROID    90 tablet    Take 1 tablet (75 mcg) by mouth daily       lisinopril-hydrochlorothiazide 20-25 MG per tablet    PRINZIDE/ZESTORETIC    90 tablet    Take 0.5 tablets by mouth daily APPT NEEDED FOR FURTHER REFILLS       MULTI-DAY VITAMINS PO      Take  by mouth. 1 tab daily       simvastatin 20 MG tablet    ZOCOR    90 tablet    Take 1 tablet (20 mg) by mouth daily

## 2017-03-20 NOTE — NURSING NOTE
"Chief Complaint   Patient presents with     Pre-Op Exam       Initial BP (!) 175/106 (Cuff Size: Adult Regular)  Pulse 86  Temp 97.8  F (36.6  C) (Oral)  Ht 5' 4\" (1.626 m)  Wt 174 lb (78.9 kg)  SpO2 98%  BMI 29.87 kg/m2 Estimated body mass index is 29.87 kg/(m^2) as calculated from the following:    Height as of this encounter: 5' 4\" (1.626 m).    Weight as of this encounter: 174 lb (78.9 kg).  Medication Reconciliation: complete    ARABELLA Shah MA    "

## 2017-03-20 NOTE — PROGRESS NOTES
[facility]  [address] [facility] [address] [phone]  Dept: [phone]    PRE-OP EVALUATION:  Today's date: 3/20/2017    Claire Moore (: 1940) presents for pre-operative evaluation assessment as requested by Rene Figueroa.  She requires evaluation and anesthesia risk assessment prior to undergoing surgery/procedure for treatment of bladder .  Proposed procedure: Cystoscopy, sling transvaginal colporrhaphy anterior     Date of Surgery/ Procedure: 17  Time of Surgery/ Procedure: 12:00pm  Hospital/Surgical Facility:  OR  Fax number for surgical facility: [phone]  Primary Physician: Kehr, Kristen M  Type of Anesthesia Anticipated: to be determined    Patient has a Health Care Directive or Living Will:  NO    1. NO - Do you have a history of heart attack, stroke, stent, bypass or surgery on an artery in the head, neck, heart or legs?  2. NO - Do you ever have any pain or discomfort in your chest?  3. NO - Do you have a history of  Heart Failure?  4. NO - Are you troubled by shortness of breath when: walking on the level, up a slight hill or at night?  5. NO - Do you currently have a cold, bronchitis or other respiratory infection?  6. NO - Do you have a cough, shortness of breath or wheezing?  7. NO - Do you sometimes get pains in the calves of your legs when you walk?  8. NO - Do you or anyone in your family have previous history of blood clots?  9. NO - Do you or does anyone in your family have a serious bleeding problem such as prolonged bleeding following surgeries or cuts?  10. NO - Have you ever had problems with anemia or been told to take iron pills?  11. NO - Have you had any abnormal blood loss such as black, tarry or bloody stools, or abnormal vaginal bleeding?  12. NO - Have you ever had a blood transfusion?  13. NO - Have you or any of your relatives ever had problems with anesthesia?  14. NO - Do you have sleep apnea, excessive snoring or daytime  drowsiness?  15. NO - Do you have any prosthetic heart valves?  16. NO - Do you have prosthetic joints?  17. NO - Is there any chance that you may be pregnant?      HPI:                                                      Brief HPI related to upcoming procedure: Claire has been having difficulty with urinary incontinence and will be having a bladder sling procedure.       HYPERTENSION - Patient has longstanding history of mod-severe HTN , currently denies any symptoms referable to elevated blood pressure. Specifically denies chest pain, palpitations, dyspnea, orthopnea, PND or peripheral edema. Blood pressure readings have been in normal range. Current medication regimen is as listed below. Patient denies any side effects of medication.                                                                                                                                                                                          .  HYPERLIPIDEMIA - Patient has a long history of significant Hyperlipidemia requiring medication for treatment with recent good control. Patient reports no problems or side effects with the medication.                                                                                                                                                       .  HYPOTHYROIDISM - Patient has a longstanding history of chronic Hypothyroidism. Patient has been doing well, noting no tremor, insomnia, hair loss or changes in skin texture.  Continues to take medications as directed, without adverse reactions or side effects.                                                                                                                                                                                                                        .    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 4/5/2012     ARABELLA Shah MA         Hypertension goal BP (blood pressure) < 140/90 03/03/2011     Priority: Medium     Hyperlipidemia LDL goal <130 03/03/2011     Priority: Medium      Past Medical History   Diagnosis Date     Cataract      DJD (degenerative joint disease) 8/14/2012     Hypertension      Osteopenia      Pure hypercholesterolemia      Retinal detachment      right eye     Unspecified hypothyroidism      Unspecified visual loss      L eye     Past Surgical History   Procedure Laterality Date     Tonsillectomy       Retinal reattachment  remotely     right eye     Phacoemulsification with standard intraocular lens implant  5/2015     right eye     Cataract iol, rt/lt       Current Outpatient Prescriptions   Medication Sig Dispense Refill     lisinopril-hydrochlorothiazide (PRINZIDE,ZESTORETIC) 20-25 MG per tablet Take 0.5 tablets by mouth daily APPT NEEDED FOR FURTHER REFILLS 90 tablet 1     levothyroxine (SYNTHROID) 75 MCG tablet Take 1 tablet (75 mcg) by mouth daily 90 tablet 3     simvastatin (ZOCOR) 20 MG tablet Take 1 tablet (20 mg) by mouth daily 90 tablet 3     aspirin 81 MG tablet Take  by mouth daily.       Multiple Vitamin (MULTI-DAY VITAMINS PO) Take  by mouth. 1 tab daily       cholecalciferol (VITAMIN D) 1000 UNIT tablet Take 1 tablet by mouth daily.       CALCIUM 500 OR 1 per day        OTC products: None, except as noted above    No Known Allergies   Latex Allergy: NO    Social History  "  Substance Use Topics     Smoking status: Former Smoker     Types: Cigarettes     Quit date: 2/1/2011     Smokeless tobacco: Never Used     Alcohol use Yes      Comment: occ     History   Drug Use No       REVIEW OF SYSTEMS:                                                    Constitutional, neuro, ENT, endocrine, pulmonary, cardiac, gastrointestinal, genitourinary, musculoskeletal, integument and psychiatric systems are negative, except as otherwise noted.    EXAM:                                                    /82  Pulse 86  Temp 97.8  F (36.6  C) (Oral)  Ht 5' 4\" (1.626 m)  Wt 174 lb (78.9 kg)  SpO2 98%  BMI 29.87 kg/m2    GENERAL APPEARANCE: healthy, alert and no distress     EYES: EOMI, PERRL     HENT: ear canals and TM's normal and nose and mouth without ulcers or lesions     NECK: no adenopathy, no asymmetry, masses, or scars and thyroid normal to palpation     RESP: lungs clear to auscultation - no rales, rhonchi or wheezes     CV: regular rates and rhythm, normal S1 S2, no S3 or S4 and no murmur, click or rub     ABDOMEN:  soft, nontender, no HSM or masses and bowel sounds normal     MS: extremities normal- no gross deformities noted, no evidence of inflammation in joints, FROM in all extremities.     SKIN: no suspicious lesions or rashes     NEURO: Normal strength and tone, sensory exam grossly normal, mentation intact and speech normal     PSYCH: mentation appears normal. and affect normal/bright    DIAGNOSTICS:                                                    EKG: appears normal, NSR, LVH    Recent Labs   Lab Test  05/11/16   0827  06/04/15   0800   NA  139  140   POTASSIUM  3.7  4.0   CR  0.85  0.93     Results for orders placed or performed in visit on 03/20/17   Potassium   Result Value Ref Range    Potassium 3.9 3.4 - 5.3 mmol/L   Hemoglobin   Result Value Ref Range    Hemoglobin 12.9 11.7 - 15.7 g/dL         IMPRESSION:                                                    Reason for " surgery/procedure: Urinary Incontinence  Diagnosis/reason for consult: preoperative clearance    The proposed surgical procedure is considered LOW risk.    REVISED CARDIAC RISK INDEX  The patient has the following serious cardiovascular risks for perioperative complications such as (MI, PE, VFib and 3  AV Block):  No serious cardiac risks  INTERPRETATION: 0 risks: Class I (very low risk - 0.4% complication rate)    The patient has the following additional risks for perioperative complications:  No identified additional risks      ICD-10-CM    1. Preop general physical exam Z01.818 EKG 12-lead complete w/read - Clinics     Potassium     Hemoglobin   2. Urinary incontinence, unspecified type R32 Potassium     Hemoglobin   3. Hypertension goal BP (blood pressure) < 140/90 I10 Potassium     Hemoglobin   4. Hyperlipidemia LDL goal <130 E78.5    5. Other specified hypothyroidism E03.8        RECOMMENDATIONS:                                                    --Hypertension: she will take her daily medication prior to surgery.     --Hyperlipidemia: well controlled on medication    --Hypothyroid: well controlled on her current dose of levothyroxine    --Patient is to take all scheduled medications on the day of surgery EXCEPT for modifications listed below.    APPROVAL GIVEN to proceed with proposed procedure, without further diagnostic evaluation       Signed Electronically by: Kristen M. Kehr, PA-C  Chart reviewed, agree with evaluation and recommendations above.  Poonam Stephenson M.D.       Copy of this evaluation report is provided to requesting physician.    [facility] Preop Guidelines

## 2017-03-20 NOTE — Clinical Note
Please sign and send to [facility] for fax. Please close. Kristen Kehr PA-C

## 2017-03-30 ENCOUNTER — ANESTHESIA (OUTPATIENT)
Dept: SURGERY | Facility: AMBULATORY SURGERY CENTER | Age: 77
End: 2017-03-30

## 2017-03-30 ENCOUNTER — HOSPITAL ENCOUNTER (OUTPATIENT)
Facility: AMBULATORY SURGERY CENTER | Age: 77
Discharge: HOME | End: 2017-03-30
Attending: UROLOGY | Admitting: UROLOGY
Payer: COMMERCIAL

## 2017-03-30 ENCOUNTER — ANESTHESIA EVENT (OUTPATIENT)
Dept: SURGERY | Facility: AMBULATORY SURGERY CENTER | Age: 77
End: 2017-03-30

## 2017-03-30 VITALS
WEIGHT: 160 LBS | TEMPERATURE: 96.3 F | BODY MASS INDEX: 27.31 KG/M2 | HEIGHT: 64 IN | SYSTOLIC BLOOD PRESSURE: 109 MMHG | OXYGEN SATURATION: 99 % | DIASTOLIC BLOOD PRESSURE: 52 MMHG | RESPIRATION RATE: 18 BRPM

## 2017-03-30 DIAGNOSIS — N81.4 UTERINE PROLAPSE: Primary | ICD-10-CM

## 2017-03-30 PROCEDURE — 57288 REPAIR BLADDER DEFECT: CPT

## 2017-03-30 PROCEDURE — G8916 PT W IV AB GIVEN ON TIME: HCPCS

## 2017-03-30 PROCEDURE — 57120 COLPOCLEISIS LE FORT TYPE: CPT | Performed by: UROLOGY

## 2017-03-30 PROCEDURE — 57288 REPAIR BLADDER DEFECT: CPT | Performed by: UROLOGY

## 2017-03-30 PROCEDURE — G8907 PT DOC NO EVENTS ON DISCHARG: HCPCS

## 2017-03-30 PROCEDURE — 57120 COLPOCLEISIS LE FORT TYPE: CPT

## 2017-03-30 DEVICE — MESH SLING ARIS OB 93-4400: Type: IMPLANTABLE DEVICE | Site: URETHRA | Status: FUNCTIONAL

## 2017-03-30 RX ORDER — PROPOFOL 10 MG/ML
INJECTION, EMULSION INTRAVENOUS PRN
Status: DISCONTINUED | OUTPATIENT
Start: 2017-03-30 | End: 2017-03-30

## 2017-03-30 RX ORDER — LIDOCAINE HYDROCHLORIDE 10 MG/ML
INJECTION, SOLUTION INFILTRATION; PERINEURAL PRN
Status: DISCONTINUED | OUTPATIENT
Start: 2017-03-30 | End: 2017-03-30 | Stop reason: HOSPADM

## 2017-03-30 RX ORDER — BUPIVACAINE HYDROCHLORIDE AND EPINEPHRINE 2.5; 5 MG/ML; UG/ML
INJECTION, SOLUTION INFILTRATION; PERINEURAL PRN
Status: DISCONTINUED | OUTPATIENT
Start: 2017-03-30 | End: 2017-03-30 | Stop reason: HOSPADM

## 2017-03-30 RX ORDER — MEPERIDINE HYDROCHLORIDE 25 MG/ML
12.5 INJECTION INTRAMUSCULAR; INTRAVENOUS; SUBCUTANEOUS
Status: DISCONTINUED | OUTPATIENT
Start: 2017-03-30 | End: 2017-03-31 | Stop reason: HOSPADM

## 2017-03-30 RX ORDER — CEFAZOLIN SODIUM 2 G/100ML
2 INJECTION, SOLUTION INTRAVENOUS
Status: COMPLETED | OUTPATIENT
Start: 2017-03-30 | End: 2017-03-30

## 2017-03-30 RX ORDER — HYDROMORPHONE HYDROCHLORIDE 1 MG/ML
.3-.5 INJECTION, SOLUTION INTRAMUSCULAR; INTRAVENOUS; SUBCUTANEOUS EVERY 10 MIN PRN
Status: DISCONTINUED | OUTPATIENT
Start: 2017-03-30 | End: 2017-03-31 | Stop reason: HOSPADM

## 2017-03-30 RX ORDER — FENTANYL CITRATE 50 UG/ML
25-50 INJECTION, SOLUTION INTRAMUSCULAR; INTRAVENOUS EVERY 5 MIN PRN
Status: DISCONTINUED | OUTPATIENT
Start: 2017-03-30 | End: 2017-03-31 | Stop reason: HOSPADM

## 2017-03-30 RX ORDER — PROPOFOL 10 MG/ML
INJECTION, EMULSION INTRAVENOUS CONTINUOUS PRN
Status: DISCONTINUED | OUTPATIENT
Start: 2017-03-30 | End: 2017-03-30

## 2017-03-30 RX ORDER — FENTANYL CITRATE 50 UG/ML
25-50 INJECTION, SOLUTION INTRAMUSCULAR; INTRAVENOUS
Status: DISCONTINUED | OUTPATIENT
Start: 2017-03-30 | End: 2017-03-31 | Stop reason: HOSPADM

## 2017-03-30 RX ORDER — LIDOCAINE HYDROCHLORIDE 20 MG/ML
INJECTION, SOLUTION INFILTRATION; PERINEURAL PRN
Status: DISCONTINUED | OUTPATIENT
Start: 2017-03-30 | End: 2017-03-30

## 2017-03-30 RX ORDER — ACETAMINOPHEN 325 MG/1
975 TABLET ORAL ONCE
Status: DISCONTINUED | OUTPATIENT
Start: 2017-03-30 | End: 2017-03-30

## 2017-03-30 RX ORDER — ONDANSETRON 2 MG/ML
INJECTION INTRAMUSCULAR; INTRAVENOUS PRN
Status: DISCONTINUED | OUTPATIENT
Start: 2017-03-30 | End: 2017-03-30

## 2017-03-30 RX ORDER — LIDOCAINE 40 MG/G
CREAM TOPICAL
Status: DISCONTINUED | OUTPATIENT
Start: 2017-03-30 | End: 2017-03-31 | Stop reason: HOSPADM

## 2017-03-30 RX ORDER — SODIUM CHLORIDE, SODIUM LACTATE, POTASSIUM CHLORIDE, CALCIUM CHLORIDE 600; 310; 30; 20 MG/100ML; MG/100ML; MG/100ML; MG/100ML
INJECTION, SOLUTION INTRAVENOUS CONTINUOUS
Status: DISCONTINUED | OUTPATIENT
Start: 2017-03-30 | End: 2017-03-31 | Stop reason: HOSPADM

## 2017-03-30 RX ORDER — ACETAMINOPHEN 325 MG/1
975 TABLET ORAL ONCE
Status: COMPLETED | OUTPATIENT
Start: 2017-03-30 | End: 2017-03-30

## 2017-03-30 RX ORDER — NALOXONE HYDROCHLORIDE 0.4 MG/ML
.1-.4 INJECTION, SOLUTION INTRAMUSCULAR; INTRAVENOUS; SUBCUTANEOUS
Status: DISCONTINUED | OUTPATIENT
Start: 2017-03-30 | End: 2017-03-31 | Stop reason: HOSPADM

## 2017-03-30 RX ORDER — FENTANYL CITRATE 50 UG/ML
INJECTION, SOLUTION INTRAMUSCULAR; INTRAVENOUS PRN
Status: DISCONTINUED | OUTPATIENT
Start: 2017-03-30 | End: 2017-03-30

## 2017-03-30 RX ORDER — ONDANSETRON 4 MG/1
4 TABLET, ORALLY DISINTEGRATING ORAL EVERY 30 MIN PRN
Status: DISCONTINUED | OUTPATIENT
Start: 2017-03-30 | End: 2017-03-31 | Stop reason: HOSPADM

## 2017-03-30 RX ORDER — ONDANSETRON 2 MG/ML
4 INJECTION INTRAMUSCULAR; INTRAVENOUS EVERY 30 MIN PRN
Status: DISCONTINUED | OUTPATIENT
Start: 2017-03-30 | End: 2017-03-31 | Stop reason: HOSPADM

## 2017-03-30 RX ORDER — HYDROCODONE BITARTRATE AND ACETAMINOPHEN 5; 325 MG/1; MG/1
1-2 TABLET ORAL EVERY 4 HOURS PRN
Qty: 30 TABLET | Refills: 0 | Status: SHIPPED | OUTPATIENT
Start: 2017-03-30 | End: 2017-05-01

## 2017-03-30 RX ADMIN — LIDOCAINE HYDROCHLORIDE 40 MG: 20 INJECTION, SOLUTION INFILTRATION; PERINEURAL at 12:20

## 2017-03-30 RX ADMIN — FENTANYL CITRATE 25 MCG: 50 INJECTION, SOLUTION INTRAMUSCULAR; INTRAVENOUS at 12:22

## 2017-03-30 RX ADMIN — FENTANYL CITRATE 25 MCG: 50 INJECTION, SOLUTION INTRAMUSCULAR; INTRAVENOUS at 12:53

## 2017-03-30 RX ADMIN — PROPOFOL 40 MG: 10 INJECTION, EMULSION INTRAVENOUS at 12:20

## 2017-03-30 RX ADMIN — PROPOFOL 100 MCG/KG/MIN: 10 INJECTION, EMULSION INTRAVENOUS at 12:20

## 2017-03-30 RX ADMIN — SODIUM CHLORIDE, SODIUM LACTATE, POTASSIUM CHLORIDE, CALCIUM CHLORIDE: 600; 310; 30; 20 INJECTION, SOLUTION INTRAVENOUS at 12:19

## 2017-03-30 RX ADMIN — ONDANSETRON 4 MG: 2 INJECTION INTRAMUSCULAR; INTRAVENOUS at 12:36

## 2017-03-30 RX ADMIN — SODIUM CHLORIDE, SODIUM LACTATE, POTASSIUM CHLORIDE, CALCIUM CHLORIDE: 600; 310; 30; 20 INJECTION, SOLUTION INTRAVENOUS at 11:02

## 2017-03-30 RX ADMIN — ACETAMINOPHEN 975 MG: 325 TABLET ORAL at 11:10

## 2017-03-30 RX ADMIN — CEFAZOLIN SODIUM 2 G: 2 INJECTION, SOLUTION INTRAVENOUS at 12:22

## 2017-03-30 NOTE — DISCHARGE INSTRUCTIONS
[facility]  Same-Day Surgery   Adult Discharge Orders & Instructions   For 24 hours after surgery  1. Get plenty of rest.  A responsible adult must stay with you for at least 24 hours after you leave the hospital.   2. Do not drive or use heavy equipment.  If you have weakness or tingling, don't drive or use heavy equipment until this feeling goes away.  3. Do not drink alcohol.  4. Avoid strenuous or risky activities.  Ask for help when climbing stairs.   5. You may feel lightheaded.  IF so, sit for a few minutes before standing.  Have someone help you get up.   6. If you have nausea (feel sick to your stomach): Drink only clear liquids such as apple juice, ginger ale, broth or 7-Up.  Rest may also help.  Be sure to drink enough fluids.  Move to a regular diet as you feel able.  7. You may have a slight fever. Call the doctor if your fever is over 100 F (37.7 C) (taken under the tongue) or lasts longer than 24 hours.  8. You may have a dry mouth, a sore throat, muscle aches or trouble sleeping.  These should go away after 24 hours.  9. Do not make important or legal decisions.   Call your doctor for any of the followin.  Signs of infection (fever, growing tenderness at the surgery site, a large amount of drainage or bleeding, severe pain, foul-smelling drainage, redness, swelling).    2. It has been over 8 to 10 hours since surgery and you are still not able to urinate (pass water).    3.  Headache for over 24 hours.        Tylenol was given at 11:10 am.          To contact a doctor call:  [phone]        Dr. Paula GAUTAM    Bladder Sling is done for treatment of urinary stress incontinence.  A bladder sling procedure   is done through the vagina and a small incision in your abdomen.     Post procedure instructions:    - You have been prescribed pain medicine.  Do not wait until pain becomes severe before taking your medicine.  The medicine may upset your stomach and should be taken  with small amounts of food.  If you pain medicine does not work, call your Physician.      - Start your diet with a light meal.  Toast, soup or scrambled eggs.  Stay away from fried or large meals.  If you become nauseated, stop eating and return to clear liquid diet.  Call your physician if nausea persists.      - Drink plenty of fluids.  This keeps your body hydrated and will make you feel better.    - You will have some blood/discharge from your vagina.  If you saturate a breanna pad  in 2 hours call your Physician.  You can expect to have discharge up to 3-4 days.      - Do not place anything in the vagina.  No tampons.    - Do not have sexual intercourse for 6 weeks.      - Activity:  Take short walks many times a day to decrease the risk of blood clots.  Walking will help improve your energy level.  You may return to work when you are not taking narcotic pain meds.    - Monitor your urine output.  If you have not urinated in 6 hours after discharge, please contact your physician.      - You may resume your pre-surgery medications once you are discharged.      Contact a caregiver if:      - You have a fever (high body temperature).  - You do not feel like you are able to empty your bladder completely when urinating.    - You feel the need to urinate very suddenly.   - You have pain when urinating.  - You have a new skin rash.  - You have chest pain.  - You have questions or concerns about your procedure, medicine, or care.            Seek care immediately if:    - You are bleeding from your vagina and it is not your monthly period.  - Your abdominal wound is bleeding, and will not stop.  - You have yellow or foul smelling discharge from your vaginal or abdominal wound.  - You cannot urinate, or you are urinating less than what is normal for you.  - You have sudden trouble breathing.  - You have new and sudden chest pain.  You may have more pain when you take deep breaths or cough.  You may cough up blood.  - Your  arm or leg feels warm, tender, and painful.  It may look swollen and red.    -

## 2017-03-30 NOTE — IP AVS SNAPSHOT
MRN:[id number]                      After Visit Summary   3/30/2017    Claire Moore    MRN: [id number]           Thank you!     Thank you for choosing [facility] for your care. Our goal is always to provide you with excellent care. Hearing back from our patients is one way we can continue to improve our services. Please take a few minutes to complete the written survey that you may receive in the mail after you visit with us. Thank you!        Patient Information     Date Of Birth          1940        About your hospital stay     You were admitted on:  March 30, 2017 You last received care in the:  [facility] [place]    You were discharged on:  March 30, 2017       Who to Call     For medical emergencies, please call 911.  For non-urgent questions about your medical care, please call your primary care provider or clinic, [phone]  For questions related to your surgery, please call your surgery clinic        Attending Provider     Provider Rene Juárez MD Urology       Primary Care Provider Office Phone # Fax #    Kristen M Kehr, PA-C [phone]       [facility] [address] [facility] [address]        After Care Instructions     Diet Instructions       Resume pre procedure diet            Discharge Instructions       Patient to arrange for follow up appointment in 4-6 weeks            No Alcohol       for 24 hours post procedure            No driving or operating machinery        until the day after procedure            No lifting over 15 pounds and no strenuous physical activity       for 2 weeks                  Your next 10 appointments already scheduled     Jun 05, 2017  9:00 AM CDT   New Visit with Shemar Jesus MD   [facility] ([facility]    [address] [id number] [phone]              Further instructions from your care team       [facility]  [place]  Same-Day Surgery   Adult Discharge Orders & Instructions   For 24 hours after surgery  1. Get plenty of rest.  A responsible adult must stay with you for at least 24 hours after you leave the hospital.   2. Do not drive or use heavy equipment.  If you have weakness or tingling, don't drive or use heavy equipment until this feeling goes away.  3. Do not drink alcohol.  4. Avoid strenuous or risky activities.  Ask for help when climbing stairs.   5. You may feel lightheaded.  IF so, sit for a few minutes before standing.  Have someone help you get up.   6. If you have nausea (feel sick to your stomach): Drink only clear liquids such as apple juice, ginger ale, broth or 7-Up.  Rest may also help.  Be sure to drink enough fluids.  Move to a regular diet as you feel able.  7. You may have a slight fever. Call the doctor if your fever is over 100 F (37.7 C) (taken under the tongue) or lasts longer than 24 hours.  8. You may have a dry mouth, a sore throat, muscle aches or trouble sleeping.  These should go away after 24 hours.  9. Do not make important or legal decisions.   Call your doctor for any of the followin.  Signs of infection (fever, growing tenderness at the surgery site, a large amount of drainage or bleeding, severe pain, foul-smelling drainage, redness, swelling).    2. It has been over 8 to 10 hours since surgery and you are still not able to urinate (pass water).    3.  Headache for over 24 hours.        Tylenol was given at 11:10 am.          To contact a doctor call:  [phone]        Dr. Paula GAUTAM    Bladder Sling is done for treatment of urinary stress incontinence.  A bladder sling procedure   is done through the vagina and a small incision in your abdomen.     Post procedure instructions:    - You have been prescribed pain medicine.  Do not wait until pain becomes severe before taking your medicine.  The medicine may upset your stomach and should be taken with small amounts of food.   If you pain medicine does not work, call your Physician.      - Start your diet with a light meal.  Toast, soup or scrambled eggs.  Stay away from fried or large meals.  If you become nauseated, stop eating and return to clear liquid diet.  Call your physician if nausea persists.      - Drink plenty of fluids.  This keeps your body hydrated and will make you feel better.    - You will have some blood/discharge from your vagina.  If you saturate a breanna pad  in 2 hours call your Physician.  You can expect to have discharge up to 3-4 days.      - Do not place anything in the vagina.  No tampons.    - Do not have sexual intercourse for 6 weeks.      - Activity:  Take short walks many times a day to decrease the risk of blood clots.  Walking will help improve your energy level.  You may return to work when you are not taking narcotic pain meds.    - Monitor your urine output.  If you have not urinated in 6 hours after discharge, please contact your physician.      - You may resume your pre-surgery medications once you are discharged.      Contact a caregiver if:      - You have a fever (high body temperature).  - You do not feel like you are able to empty your bladder completely when urinating.    - You feel the need to urinate very suddenly.   - You have pain when urinating.  - You have a new skin rash.  - You have chest pain.  - You have questions or concerns about your procedure, medicine, or care.            Seek care immediately if:    - You are bleeding from your vagina and it is not your monthly period.  - Your abdominal wound is bleeding, and will not stop.  - You have yellow or foul smelling discharge from your vaginal or abdominal wound.  - You cannot urinate, or you are urinating less than what is normal for you.  - You have sudden trouble breathing.  - You have new and sudden chest pain.  You may have more pain when you take deep breaths or cough.  You may cough up blood.  - Your arm or leg feels warm,  "tender, and painful.  It may look swollen and red.    -       Pending Results     No orders found from 3/28/2017 to 3/31/2017.            Admission Information     Date & Time Provider Department Dept. Phone    3/30/2017 Rene Mitchell MD [facility] [place] [phone]      Your Vitals Were     Blood Pressure Temperature Respirations Height Weight Pulse Oximetry    109/52 96.3  F (35.7  C) (Temporal) 18 1.626 m (5' 4\") 72.6 kg (160 lb) 99%    BMI (Body Mass Index)                   27.46 kg/m2           [employer] Information     [employer] lets you send messages to your doctor, view your test results, renew your prescriptions, schedule appointments and more. To sign up, go to [email or web address] . Click on \"Log in\" on the left side of the screen, which will take you to the Welcome page. Then click on \"Sign up Now\" on the right side of the page.     You will be asked to enter the access code listed below, as well as some personal information. Please follow the directions to create your username and password.     Your access code is: [email or web address]  Expires: 2017 11:07 AM     Your access code will  in 90 days. If you need help or a new code, please call your [facility] clinic or [phone].        Care EveryWhere ID     This is your Care EveryWhere ID. This could be used by other organizations to access your [facility] medical records  [id number]           Review of your medicines      UNREVIEWED medicines. Ask your doctor about these medicines        Dose / Directions    aspirin 81 MG tablet        Take  by mouth daily.   Refills:  0       CALCIUM 500 PO        1 per day   Refills:  0       cholecalciferol 1000 UNIT tablet   Commonly known as:  vitamin D        Dose:  1 tablet   Take 1 tablet by mouth daily.   Refills:  0       levothyroxine 75 MCG tablet   Commonly known as:  SYNTHROID   Used for:  Other specified hypothyroidism        Dose:  75 mcg   Take 1 tablet (75 mcg) by mouth daily "   Quantity:  90 tablet   Refills:  3       lisinopril-hydrochlorothiazide 20-25 MG per tablet   Commonly known as:  PRINZIDE/ZESTORETIC   Used for:  Essential hypertension with goal blood pressure less than 140/90        Dose:  0.5 tablet   Take 0.5 tablets by mouth daily APPT NEEDED FOR FURTHER REFILLS   Quantity:  90 tablet   Refills:  1       MULTI-DAY VITAMINS PO        Take  by mouth. 1 tab daily   Refills:  0       simvastatin 20 MG tablet   Commonly known as:  ZOCOR   Used for:  Hyperlipidemia LDL goal <130        Dose:  20 mg   Take 1 tablet (20 mg) by mouth daily   Quantity:  90 tablet   Refills:  3         START taking        Dose / Directions    cephalexin 250 MG capsule   Commonly known as:  KEFLEX   Used for:  Uterine prolapse        Dose:  250 mg   Take 1 capsule (250 mg) by mouth 2 times daily for 2 days   Quantity:  4 capsule   Refills:  0       HYDROcodone-acetaminophen 5-325 MG per tablet   Commonly known as:  NORCO   Used for:  Uterine prolapse        Dose:  1-2 tablet   Take 1-2 tablets by mouth every 4 hours as needed for moderate to severe pain (Moderate to Severe Pain)   Quantity:  30 tablet   Refills:  0            Where to get your medicines      These medications were sent to [facility] - [place], [place] - [address], [address]  [address], [address], [facility] [address]     Phone:  [phone]     cephalexin 250 MG capsule         Some of these will need a paper prescription and others can be bought over the counter. Ask your nurse if you have questions.     Bring a paper prescription for each of these medications     HYDROcodone-acetaminophen 5-325 MG per tablet                Protect others around you: Learn how to safely use, store and throw away your medicines at www.disposemymeds.org.             Medication List: This is a list of all your medications and when to take them. Check marks below indicate your daily home schedule. Keep this list as a  reference.      Medications           Morning Afternoon Evening Bedtime As Needed    aspirin 81 MG tablet   Take  by mouth daily.                                CALCIUM 500 PO   1 per day                                cephalexin 250 MG capsule   Commonly known as:  KEFLEX   Take 1 capsule (250 mg) by mouth 2 times daily for 2 days                                cholecalciferol 1000 UNIT tablet   Commonly known as:  vitamin D   Take 1 tablet by mouth daily.                                HYDROcodone-acetaminophen 5-325 MG per tablet   Commonly known as:  NORCO   Take 1-2 tablets by mouth every 4 hours as needed for moderate to severe pain (Moderate to Severe Pain)                                levothyroxine 75 MCG tablet   Commonly known as:  SYNTHROID   Take 1 tablet (75 mcg) by mouth daily                                lisinopril-hydrochlorothiazide 20-25 MG per tablet   Commonly known as:  PRINZIDE/ZESTORETIC   Take 0.5 tablets by mouth daily APPT NEEDED FOR FURTHER REFILLS                                MULTI-DAY VITAMINS PO   Take  by mouth. 1 tab daily                                simvastatin 20 MG tablet   Commonly known as:  ZOCOR   Take 1 tablet (20 mg) by mouth daily

## 2017-03-30 NOTE — BRIEF OP NOTE
[facility]  Brief Operative Note    Pre-operative diagnosis: Uterine prolapse   Post-operative diagnosis same   Procedure: Procedure(s):  CYSTOSCOPY, SLING TRANSVAGINAL  COLPOcleisis   Surgeon: Rene Mitchell MD   Assistants(s): None   Anesthesia: Local with MAC    Estimated blood loss: minimal                   Specimens: None   Implants: None       Complications: None   Condition on discharge: Stable   Comments: See dictated operative report for full details

## 2017-03-30 NOTE — ANESTHESIA CARE TRANSFER NOTE
Patient: Claire NYE Schiller    Procedure(s):  Cysto with TVT, colpocleisis - Wound Class: II-Clean Contaminated   - Wound Class: II-Clean Contaminated    Diagnosis: uterine prolapse  Diagnosis Additional Information: No value filed.    Anesthesia Type:   MAC     Note:  Airway :Nasal Cannula  Patient transferred to:Phase II  Comments: To Phase II, awake, comfortable, sats 100%, NC, Report to RN.      Vitals: (Last set prior to Anesthesia Care Transfer)    CRNA VITALS  3/30/2017 1324 - 3/30/2017 1358      3/30/2017             Pulse: 63    SpO2: 99 %                Electronically Signed By: GINA Olmedo CRNA  March 30, 2017  1:58 PM

## 2017-03-30 NOTE — IP AVS SNAPSHOT
[facility] [place]    [address] [place] [address]    Phone:  [phone]                                       After Visit Summary   3/30/2017    Claire Moore    MRN: [id number]           After Visit Summary Signature Page     I have received my discharge instructions, and my questions have been answered. I have discussed any challenges I see with this plan with the nurse or doctor.    ..........................................................................................................................................  Patient/Patient Representative Signature      ..........................................................................................................................................  Patient Representative Print Name and Relationship to Patient    ..................................................               ................................................  Date                                            Time    ..........................................................................................................................................  Reviewed by Signature/Title    ...................................................              ..............................................  Date                                                            Time

## 2017-03-30 NOTE — ANESTHESIA POSTPROCEDURE EVALUATION
Patient: Claire NYE Schiller    Procedure(s):  Cysto with TVT, colpocleisis - Wound Class: II-Clean Contaminated   - Wound Class: II-Clean Contaminated    Diagnosis:uterine prolapse  Diagnosis Additional Information: No value filed.    Anesthesia Type:  MAC    Note:  Anesthesia Post Evaluation    Patient location during evaluation: PACU  Patient participation: Able to fully participate in evaluation  Level of consciousness: awake  Pain management: adequate  Airway patency: patent  Cardiovascular status: acceptable  Respiratory status: acceptable  Hydration status: balanced  PONV: none     Anesthetic complications: None          Last vitals:  Vitals:    03/30/17 1046 03/30/17 1356   BP: 166/77 109/52   Resp: 18 18   Temp: 96.9  F (36.1  C) 96.3  F (35.7  C)   SpO2: 98% 99%         Electronically Signed By: Tripp Mcdonald MD  March 30, 2017  2:56 PM

## 2017-03-31 NOTE — OP NOTE
DATE OF PROCEDURE:  03/30/2017      PREOPERATIVE DIAGNOSIS:  Complete grade IV uterine prolapse.      POSTOPERATIVE DIAGNOSIS:  Complete grade IV uterine prolapse.      PROCEDURE:   1.  Colpocleisis.   2.  Mid-urethral sling.      DESCRIPTION OF PROCEDURE:  Claire Moore was brought to the operating room.  After sedation was induced, she was placed in dorsal lithotomy position.  She was draped and prepped in the usual surgical fashion.  Preoperative antibiotic was given.  0.25% Marcaine with epinephrine along with 1% lidocaine was used as local anesthetic.  I then proceeded to evette out the anterior vaginal wall and posterior vaginal wall.  A rectangular sheet of vaginal tissue was excised on both sides.  This was made along the lateral edge of the vaginal wall of the vagina anteriorly.  It started about 1 cm from the cervix to the bladder neck area.  Posteriorly, it was 1 cm from the vaginal opening, and 1 cm distal to the cervix.  These two pieces of vaginal skin were excised in the usual fashion.  After this was done, I then proceeded to close the vagina shut, using 3-0 Vicryl suture in the usual fashion.  This will prevent the uterus from falling out.  After this was done, a mid-urethral sling was performed using an Blue device.  A small incision was made on the mid urethra, two stab incisions were made at the obturator canal, and the vaginal incision was dissected off from the urethral fascia bilaterally.  The trocar provided by the device was placed through the obturator canal to the vaginal incision.  The mesh was then laid flat underneath the urethra.  Vaginal mucosa was then closed using 2-0 Vicryl suture.  The excess mesh was then excised.  The stab incision was closed using Dermabond.  Cystoscopy was performed.  No evidence of bladder or urethral injury.  The patient tolerated the procedure well.  There were no complications identified during the procedure.  There was minimal bleeding during the operation.          KATYA BENJAMIN MD             D: 2017 13:59   T: 2017 04:17   MT: EM#[id number]      Name:     CAN WOODS   MRN:      [id number]        Account:        [id number]   :      1940           Procedure Date: 2017      Document: [id number]       cc: Kristen Kehr PA

## 2017-05-01 ENCOUNTER — OFFICE VISIT (OUTPATIENT)
Dept: UROLOGY | Facility: CLINIC | Age: 77
End: 2017-05-01
Payer: COMMERCIAL

## 2017-05-01 VITALS
OXYGEN SATURATION: 98 % | SYSTOLIC BLOOD PRESSURE: 150 MMHG | HEART RATE: 76 BPM | RESPIRATION RATE: 14 BRPM | DIASTOLIC BLOOD PRESSURE: 84 MMHG

## 2017-05-01 DIAGNOSIS — I10 HYPERTENSION GOAL BP (BLOOD PRESSURE) < 140/90: ICD-10-CM

## 2017-05-01 DIAGNOSIS — Z98.890 POSTOPERATIVE STATE: Primary | ICD-10-CM

## 2017-05-01 PROCEDURE — 99024 POSTOP FOLLOW-UP VISIT: CPT | Performed by: UROLOGY

## 2017-05-01 NOTE — PROGRESS NOTES
Chief Complaint   Patient presents with     RECHECK     post op TVT       Claire Moore is a 76 year old female who presents today for follow up of   Chief Complaint   Patient presents with     RECHECK     post op TVT    f/u post colpocleisis and TVT.  She is doing well without any complaints.    Current Outpatient Prescriptions   Medication Sig Dispense Refill     lisinopril-hydrochlorothiazide (PRINZIDE,ZESTORETIC) 20-25 MG per tablet Take 0.5 tablets by mouth daily APPT NEEDED FOR FURTHER REFILLS 90 tablet 1     levothyroxine (SYNTHROID) 75 MCG tablet Take 1 tablet (75 mcg) by mouth daily 90 tablet 3     simvastatin (ZOCOR) 20 MG tablet Take 1 tablet (20 mg) by mouth daily 90 tablet 3     aspirin 81 MG tablet Take  by mouth daily.       Multiple Vitamin (MULTI-DAY VITAMINS PO) Take  by mouth. 1 tab daily       cholecalciferol (VITAMIN D) 1000 UNIT tablet Take 1 tablet by mouth daily.       CALCIUM 500 OR 1 per day        No Known Allergies   Past Medical History:   Diagnosis Date     Cataract      DJD (degenerative joint disease) 8/14/2012     Hypertension      Osteopenia      Pure hypercholesterolemia      Retinal detachment     right eye     Unspecified hypothyroidism      Unspecified visual loss     L eye     Past Surgical History:   Procedure Laterality Date     CATARACT IOL, RT/LT       COLPORRHAPHY ANTERIOR N/A 3/30/2017    Procedure: COLPORRHAPHY ANTERIOR;  Surgeon: Rene Mitchell MD;  Location: [facility] OR     CYSTOSCOPY, SLING TRANSVAGINAL N/A 3/30/2017    Procedure: CYSTOSCOPY, SLING TRANSVAGINAL;  Surgeon: Rene Mitchell MD;  Location: [facility] OR     D & C       PHACOEMULSIFICATION WITH STANDARD INTRAOCULAR LENS IMPLANT  5/2015    right eye     RETINAL REATTACHMENT  remotely    right eye     TONSILLECTOMY       TUBAL LIGATION       Family History   Problem Relation Age of Onset     Alzheimer Disease Mother      OSTEOPOROSIS Mother      Hypertension Mother      Prostate Cancer Father      OSTEOPOROSIS  Maternal Grandmother      CANCER Brother      Eye Disorder Brother      Eye Disorder Sister      Eye Disorder Son      Eye Disorder Brother      Eye Disorder Brother      CEREBROVASCULAR DISEASE Paternal Grandmother      DIABETES No family hx of      Thyroid Disease No family hx of      Glaucoma No family hx of      Macular Degeneration No family hx of      Social History     Social History     Marital status:      Spouse name: N/A     Number of children: N/A     Years of education: N/A     Social History Main Topics     Smoking status: Former Smoker     Types: Cigarettes     Quit date: 2/1/2011     Smokeless tobacco: Never Used     Alcohol use Yes      Comment: occ     Drug use: No     Sexual activity: No     Other Topics Concern     None     Social History Narrative       REVIEW OF SYSTEMS  =================  C: NEGATIVE for fever, chills, change in weight  I: NEGATIVE for worrisome rashes, moles or lesions  E/M: NEGATIVE for ear, mouth and throat problems  R: NEGATIVE for significant cough or SHORTNESS OF BREATH,   CV: NEGATIVE for chest pain, palpitations or peripheral edema  GI: NEGATIVE for nausea, abdominal pain, heartburn, or change in bowel habits  NEURO: NEGATIVE any motor/sensory changes  PSYCH: NEGATIVE for recent mood disorder    Physical Exam:  Pulse 76  Resp 14  SpO2 98%   Patient is pleasant, in no acute distress, good general condition.  Lung: no evidence of respiratory distress    Abdomen: Soft, nondistended, non tender. No masses. No rebound or guarding.   Exam: incision c/d/i  Skin: Warm and dry.  No redness.  Psych: normal mood and affect  Neuro: alert and oriented    Assessment/Plan:   (Z98.890) Postoperative state  (primary encounter diagnosis)  Comment: doing well post op  Plan: f/u prn    HTN: Patient to follow up with Primary Care provider regarding elevated blood pressure.

## 2017-05-01 NOTE — MR AVS SNAPSHOT
"              After Visit Summary   5/1/2017    Claire Moore    MRN: [id number]           Patient Information     Date Of Birth          1940        Visit Information        Provider Department      5/1/2017 9:00 AM Rene Mitchell MD [facility]        Today's Diagnoses     Postoperative state    -  1       Follow-ups after your visit        Your next 10 appointments already scheduled     Jun 05, 2017  9:00 AM CDT   New Visit with Shemar Jesus MD   [facility]    [address] [facility] [address] [phone]              Who to contact     If you have questions or need follow up information about today's clinic visit or your schedule please contact [facility] directly at [phone].  Normal or non-critical lab and imaging results will be communicated to you by MyChart, letter or phone within 4 business days after the clinic has received the results. If you do not hear from us within 7 days, please contact the clinic through MyChart or phone. If you have a critical or abnormal lab result, we will notify you by phone as soon as possible.  Submit refill requests through [employer] or call your pharmacy and they will forward the refill request to us. Please allow 3 business days for your refill to be completed.          Additional Information About Your Visit        MyChart Information     [employer]Pixies lets you send messages to your doctor, view your test results, renew your prescriptions, schedule appointments and more. To sign up, go to [email or web address] . Click on \"Log in\" on the left side of the screen, which will take you to the Welcome page. Then click on \"Sign up Now\" on the right side of the page.     You will be asked to enter the access code listed below, as well as some personal information. Please follow the directions to create your username and password.     Your access code is: [email or web address]  Expires: 7/30/2017  " 9:06 AM     Your access code will  in 90 days. If you need help or a new code, please call your [place] clinic or [phone].        Care EveryWhere ID     This is your Care EveryWhere ID. This could be used by other organizations to access your [place] medical records  [id number]        Your Vitals Were     Pulse Respirations Pulse Oximetry             76 14 98%          Blood Pressure from Last 3 Encounters:   17 109/52   17 138/82   17 184/88    Weight from Last 3 Encounters:   17 72.6 kg (160 lb)   17 78.9 kg (174 lb)   17 74.8 kg (165 lb)              Today, you had the following     No orders found for display         Today's Medication Changes          These changes are accurate as of: 17  9:06 AM.  If you have any questions, ask your nurse or doctor.               Stop taking these medicines if you haven't already. Please contact your care team if you have questions.     HYDROcodone-acetaminophen 5-325 MG per tablet   Commonly known as:  NORCO   Stopped by:  Rene Mitchell MD                    Primary Care Provider Office Phone # Fax #    Kristen M Kehr, PA-C [phone]       [facility] [address] [address] [facility] [address]        Thank you!     Thank you for choosing [facility] [place]  for your care. Our goal is always to provide you with excellent care. Hearing back from our patients is one way we can continue to improve our services. Please take a few minutes to complete the written survey that you may receive in the mail after your visit with us. Thank you!             Your Updated Medication List - Protect others around you: Learn how to safely use, store and throw away your medicines at www.disposemymeds.org.          This list is accurate as of: 17  9:06 AM.  Always use your most recent med list.                   Brand Name Dispense Instructions for use    aspirin 81 MG tablet      Take  by mouth daily.        CALCIUM 500 PO      1 per day       cholecalciferol 1000 UNIT tablet    vitamin D     Take 1 tablet by mouth daily.       levothyroxine 75 MCG tablet    SYNTHROID    90 tablet    Take 1 tablet (75 mcg) by mouth daily       lisinopril-hydrochlorothiazide 20-25 MG per tablet    PRINZIDE/ZESTORETIC    90 tablet    Take 0.5 tablets by mouth daily APPT NEEDED FOR FURTHER REFILLS       MULTI-DAY VITAMINS PO      Take  by mouth. 1 tab daily       simvastatin 20 MG tablet    ZOCOR    90 tablet    Take 1 tablet (20 mg) by mouth daily

## 2017-05-01 NOTE — NURSING NOTE
"Chief Complaint   Patient presents with     RECHECK     post op TVT       Initial Pulse 76  Resp 14  SpO2 98% Estimated body mass index is 27.46 kg/(m^2) as calculated from the following:    Height as of 3/23/17: 1.626 m (5' 4\").    Weight as of 3/23/17: 72.6 kg (160 lb).  Medication Reconciliation: complete   Hiwot Gordon CMA      "

## 2017-05-31 ENCOUNTER — DOCUMENTATION ONLY (OUTPATIENT)
Dept: LAB | Facility: CLINIC | Age: 77
End: 2017-05-31

## 2017-05-31 DIAGNOSIS — I10 HYPERTENSION GOAL BP (BLOOD PRESSURE) < 140/90: ICD-10-CM

## 2017-05-31 DIAGNOSIS — E78.5 HYPERLIPIDEMIA LDL GOAL <130: Primary | ICD-10-CM

## 2017-05-31 DIAGNOSIS — E03.8 OTHER SPECIFIED HYPOTHYROIDISM: ICD-10-CM

## 2017-05-31 NOTE — PROGRESS NOTES
Please review, associate diagnosis and sign pending laboratory future orders for patient  upcoming lab appointment on 06/06/17.    Thank you,   Olesya Membreno MLT

## 2017-05-31 NOTE — PROGRESS NOTES
Need previsit labs-see orders and close encounter if nothing else needed./Beatris [place],       Physical 6/12/17

## 2017-06-05 ENCOUNTER — OFFICE VISIT (OUTPATIENT)
Dept: OPHTHALMOLOGY | Facility: CLINIC | Age: 77
End: 2017-06-05
Payer: COMMERCIAL

## 2017-06-05 DIAGNOSIS — Z96.1 PSEUDOPHAKIA: ICD-10-CM

## 2017-06-05 DIAGNOSIS — H52.4 PRESBYOPIA: ICD-10-CM

## 2017-06-05 DIAGNOSIS — H44.522 BLIND HYPOTENSIVE EYE, LEFT: ICD-10-CM

## 2017-06-05 DIAGNOSIS — Z01.01 ENCOUNTER FOR EXAMINATION OF EYES AND VISION WITH ABNORMAL FINDINGS: Primary | ICD-10-CM

## 2017-06-05 DIAGNOSIS — H43.811 POSTERIOR VITREOUS DETACHMENT, RIGHT: ICD-10-CM

## 2017-06-05 PROCEDURE — 92015 DETERMINE REFRACTIVE STATE: CPT | Performed by: OPHTHALMOLOGY

## 2017-06-05 PROCEDURE — 92014 COMPRE OPH EXAM EST PT 1/>: CPT | Performed by: OPHTHALMOLOGY

## 2017-06-05 ASSESSMENT — CONF VISUAL FIELD
OS_INFERIOR_TEMPORAL_RESTRICTION: 1
OS_SUPERIOR_NASAL_RESTRICTION: 1
OD_NORMAL: 1
OS_INFERIOR_NASAL_RESTRICTION: 1
OS_SUPERIOR_TEMPORAL_RESTRICTION: 1

## 2017-06-05 ASSESSMENT — REFRACTION_WEARINGRX
OD_CYLINDER: +0.50
OS_SPHERE: BALANCE
OD_ADD: +3.00
SPECS_TYPE: SVL
OD_AXIS: 055
OD_SPHERE: -1.00

## 2017-06-05 ASSESSMENT — SLIT LAMP EXAM - LIDS
COMMENTS: 1+ DERMATOCHALASIS - UPPER LID
COMMENTS: 1+ DERMATOCHALASIS - UPPER LID

## 2017-06-05 ASSESSMENT — REFRACTION_MANIFEST
OD_CYLINDER: SPHERE
OS_SPHERE: BALANCE
OD_SPHERE: -0.50
OD_ADD: +3.00

## 2017-06-05 ASSESSMENT — VISUAL ACUITY
OD_CC: 20/25-1
METHOD: SNELLEN - LINEAR
OD_CC: J2+
OS_CC: NLP

## 2017-06-05 ASSESSMENT — CUP TO DISC RATIO: OD_RATIO: 0.2

## 2017-06-05 ASSESSMENT — TONOMETRY
OD_IOP_MMHG: 16
OS_IOP_MMHG: 08
IOP_METHOD: APPLANATION

## 2017-06-05 ASSESSMENT — EXTERNAL EXAM - RIGHT EYE: OD_EXAM: 2+ BROW PTOSIS

## 2017-06-05 ASSESSMENT — EXTERNAL EXAM - LEFT EYE: OS_EXAM: 2+ BROW PTOSIS

## 2017-06-05 NOTE — PROGRESS NOTES
Current Eye Medications: no     Subjective:  Comprehensive eye exam.   Pt reports she can see just as good or better  in the distance without her glasses.     Objective:  See Ophthalmology Exam.       Assessment:  Stable eye exam in patient who is functionally monocular.      ICD-10-CM    1. Encounter for examination of eyes and vision with abnormal findings Z01.01 REFRACTIVE STATUS   2. Presbyopia H52.4 REFRACTIVE STATUS   3. Pseudophakia, od Z96.1 EYE EXAM (SIMPLE-NONBILLABLE)   4. 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 Posterior vitreous detachment, right H43.811         Plan: Glasses Rx given - optional   Possible clouding of posterior capsule discussed. Right eye   Call in February 2018 for an appointment in June 2018 for Complete Exam    Dr. Jesus [phone]

## 2017-06-05 NOTE — PATIENT INSTRUCTIONS
Glasses Rx given - optional   Possible clouding of posterior capsule discussed. Right eye   Call in February 2018 for an appointment in June 2018 for Complete Exam    Dr. Jesus [phone]

## 2017-06-05 NOTE — MR AVS SNAPSHOT
After Visit Summary   6/5/2017    Claire Moore    MRN: [id number]           Patient Information     Date Of Birth          1940        Visit Information        Provider Department      6/5/2017 9:00 AM Shemar Jesus MD [facility]        Today's Diagnoses     Encounter for examination of eyes and vision with abnormal findings    -  1    Presbyopia          Care Instructions    Glasses Rx given - optional   Possible clouding of posterior capsule discussed. Right eye   Call in February 2018 for an appointment in June 2018 for Complete Exam    Dr. Jesus [phone]          Follow-ups after your visit        Your next 10 appointments already scheduled     Jun 06, 2017  8:15 AM CDT   LAB with AN LAB   [facility]    [address] [facility] [address] [phone]           Patient must bring picture ID.  Patient should be prepared to give a urine specimen  Please do not eat 10-12 hours before your appointment if you are coming in fasting for labs on lipids, cholesterol, or glucose (sugar).  Pregnant women should follow their Care Team instructions. Water with medications is okay. Do not drink coffee or other fluids.   If you have concerns about taking  your medications, please ask at office or if scheduling via [email or web address], send a message by clicking on Secure Messaging, Message Your Care Team.            Jun 12, 2017  9:00 AM CDT   PHYSICAL with Kristen M Kehr, PA-C   [facility]    [address] [facility] [address] [phone]              Who to contact     If you have questions or need follow up information about today's clinic visit or your schedule please contact [facility] directly at [phone].  Normal or non-critical lab and imaging results will be communicated to you by MyChart, letter or phone within 4 business days after the clinic has received the  "results. If you do not hear from us within 7 days, please contact the clinic through [employer] or phone. If you have a critical or abnormal lab result, we will notify you by phone as soon as possible.  Submit refill requests through [employer] or call your pharmacy and they will forward the refill request to us. Please allow 3 business days for your refill to be completed.          Additional Information About Your Visit        WiFi Rail Information     [employer] lets you send messages to your doctor, view your test results, renew your prescriptions, schedule appointments and more. To sign up, go to [email or web address] . Click on \"Log in\" on the left side of the screen, which will take you to the Welcome page. Then click on \"Sign up Now\" on the right side of the page.     You will be asked to enter the access code listed below, as well as some personal information. Please follow the directions to create your username and password.     Your access code is: [email or web address]  Expires: 2017  9:06 AM     Your access code will  in 90 days. If you need help or a new code, please call your [place] clinic or [phone].        Care EveryWhere ID     This is your Care EveryWhere ID. This could be used by other organizations to access your [place] medical records  [id number]         Blood Pressure from Last 3 Encounters:   17 150/84   17 109/52   17 138/82    Weight from Last 3 Encounters:   17 72.6 kg (160 lb)   17 78.9 kg (174 lb)   17 74.8 kg (165 lb)              Today, you had the following     No orders found for display       Primary Care Provider Office Phone # Fax #    Kristen M Kehr, PA-C [phone]       [facility] [address] [address] [facility] [address]        Thank you!     Thank you for choosing [facility] [place]  for your care. Our goal is always to provide you with excellent care. Hearing back from our patients is one way we can " continue to improve our services. Please take a few minutes to complete the written survey that you may receive in the mail after your visit with us. Thank you!             Your Updated Medication List - Protect others around you: Learn how to safely use, store and throw away your medicines at www.disposemymeds.org.          This list is accurate as of: 6/5/17  9:56 AM.  Always use your most recent med list.                   Brand Name Dispense Instructions for use    aspirin 81 MG tablet      Take  by mouth daily.       CALCIUM 500 PO      1 per day       cholecalciferol 1000 UNIT tablet    vitamin D     Take 1 tablet by mouth daily.       levothyroxine 75 MCG tablet    SYNTHROID    90 tablet    Take 1 tablet (75 mcg) by mouth daily       lisinopril-hydrochlorothiazide 20-25 MG per tablet    PRINZIDE/ZESTORETIC    90 tablet    Take 0.5 tablets by mouth daily APPT NEEDED FOR FURTHER REFILLS       MULTI-DAY VITAMINS PO      Take  by mouth. 1 tab daily       simvastatin 20 MG tablet    ZOCOR    90 tablet    Take 1 tablet (20 mg) by mouth daily

## 2017-06-06 DIAGNOSIS — E78.5 HYPERLIPIDEMIA LDL GOAL <130: ICD-10-CM

## 2017-06-06 DIAGNOSIS — E03.8 OTHER SPECIFIED HYPOTHYROIDISM: ICD-10-CM

## 2017-06-06 DIAGNOSIS — I10 HYPERTENSION GOAL BP (BLOOD PRESSURE) < 140/90: ICD-10-CM

## 2017-06-06 LAB
ANION GAP SERPL CALCULATED.3IONS-SCNC: 7 MMOL/L (ref 3–14)
BUN SERPL-MCNC: 18 MG/DL (ref 7–30)
CALCIUM SERPL-MCNC: 9.2 MG/DL (ref 8.5–10.1)
CHLORIDE SERPL-SCNC: 102 MMOL/L (ref 94–109)
CHOLEST SERPL-MCNC: 171 MG/DL
CO2 SERPL-SCNC: 29 MMOL/L (ref 20–32)
CREAT SERPL-MCNC: 0.79 MG/DL (ref 0.52–1.04)
GFR SERPL CREATININE-BSD FRML MDRD: 70 ML/MIN/1.7M2
GLUCOSE SERPL-MCNC: 91 MG/DL (ref 70–99)
HDLC SERPL-MCNC: 75 MG/DL
LDLC SERPL CALC-MCNC: 72 MG/DL
NONHDLC SERPL-MCNC: 96 MG/DL
POTASSIUM SERPL-SCNC: 4.1 MMOL/L (ref 3.4–5.3)
SODIUM SERPL-SCNC: 138 MMOL/L (ref 133–144)
TRIGL SERPL-MCNC: 121 MG/DL
TSH SERPL DL<=0.005 MIU/L-ACNC: 1.43 MU/L (ref 0.4–4)

## 2017-06-06 PROCEDURE — 80061 LIPID PANEL: CPT | Performed by: PHYSICIAN ASSISTANT

## 2017-06-06 PROCEDURE — 80048 BASIC METABOLIC PNL TOTAL CA: CPT | Performed by: PHYSICIAN ASSISTANT

## 2017-06-06 PROCEDURE — 84443 ASSAY THYROID STIM HORMONE: CPT | Performed by: PHYSICIAN ASSISTANT

## 2017-06-06 PROCEDURE — 36415 COLL VENOUS BLD VENIPUNCTURE: CPT | Performed by: PHYSICIAN ASSISTANT

## 2017-06-06 NOTE — LETTER
[facility]  [address] [facility] [address] [phone]        June 7, 2017    Claire Moore  [address] [place] [address]            Dear Claire,    The results of your recent tests were normal.  Below is a copy of the results.  It was a pleasure to see you at your last appointment.    If you have any questions or concerns, please call myself or my nurse at [phone].    Sincerely,    Kristen Kehr, PA-C/tuyet    Results for orders placed or performed in visit on 06/06/17   Basic metabolic panel  (Ca, Cl, CO2, Creat, Gluc, K, Na, BUN)   Result Value Ref Range    Sodium 138 133 - 144 mmol/L    Potassium 4.1 3.4 - 5.3 mmol/L    Chloride 102 94 - 109 mmol/L    Carbon Dioxide 29 20 - 32 mmol/L    Anion Gap 7 3 - 14 mmol/L    Glucose 91 70 - 99 mg/dL    Urea Nitrogen 18 7 - 30 mg/dL    Creatinine 0.79 0.52 - 1.04 mg/dL    GFR Estimate 70 >60 mL/min/1.7m2    GFR Estimate If Black 85 >60 mL/min/1.7m2    Calcium 9.2 8.5 - 10.1 mg/dL   Lipid Profile with reflex to direct LDL   Result Value Ref Range    Cholesterol 171 <200 mg/dL    Triglycerides 121 <150 mg/dL    HDL Cholesterol 75 >49 mg/dL    LDL Cholesterol Calculated 72 <100 mg/dL    Non HDL Cholesterol 96 <130 mg/dL   TSH with free T4 reflex   Result Value Ref Range    TSH 1.43 0.40 - 4.00 mU/L

## 2017-06-07 DIAGNOSIS — E03.8 OTHER SPECIFIED HYPOTHYROIDISM: ICD-10-CM

## 2017-06-07 RX ORDER — LEVOTHYROXINE SODIUM 75 UG/1
TABLET ORAL
Qty: 90 TABLET | Refills: 3 | Status: SHIPPED | OUTPATIENT
Start: 2017-06-07 | End: 2017-06-12

## 2017-06-12 ENCOUNTER — OFFICE VISIT (OUTPATIENT)
Dept: FAMILY MEDICINE | Facility: CLINIC | Age: 77
End: 2017-06-12
Payer: COMMERCIAL

## 2017-06-12 VITALS
OXYGEN SATURATION: 96 % | HEART RATE: 77 BPM | DIASTOLIC BLOOD PRESSURE: 88 MMHG | SYSTOLIC BLOOD PRESSURE: 172 MMHG | TEMPERATURE: 97.7 F | WEIGHT: 165 LBS | HEIGHT: 64 IN | BODY MASS INDEX: 28.17 KG/M2

## 2017-06-12 DIAGNOSIS — Z00.00 MEDICARE ANNUAL WELLNESS VISIT, SUBSEQUENT: Primary | ICD-10-CM

## 2017-06-12 DIAGNOSIS — E03.8 OTHER SPECIFIED HYPOTHYROIDISM: ICD-10-CM

## 2017-06-12 DIAGNOSIS — I10 ESSENTIAL HYPERTENSION WITH GOAL BLOOD PRESSURE LESS THAN 140/90: ICD-10-CM

## 2017-06-12 DIAGNOSIS — E78.5 HYPERLIPIDEMIA LDL GOAL <130: ICD-10-CM

## 2017-06-12 PROCEDURE — G0439 PPPS, SUBSEQ VISIT: HCPCS | Performed by: PHYSICIAN ASSISTANT

## 2017-06-12 RX ORDER — LEVOTHYROXINE SODIUM 75 UG/1
75 TABLET ORAL DAILY
Qty: 90 TABLET | Refills: 3 | Status: SHIPPED | OUTPATIENT
Start: 2017-06-12 | End: 2018-06-21

## 2017-06-12 RX ORDER — LISINOPRIL AND HYDROCHLOROTHIAZIDE 20; 25 MG/1; MG/1
0.5 TABLET ORAL DAILY
Qty: 90 TABLET | Refills: 1 | Status: SHIPPED | OUTPATIENT
Start: 2017-06-12 | End: 2018-06-01

## 2017-06-12 RX ORDER — SIMVASTATIN 20 MG
20 TABLET ORAL DAILY
Qty: 90 TABLET | Refills: 3 | Status: SHIPPED | OUTPATIENT
Start: 2017-06-12 | End: 2018-07-18

## 2017-06-12 NOTE — PROGRESS NOTES
SUBJECTIVE:                                                            Claire Moore is a 76 year old female who presents for Preventive Visit.    Are you in the first 12 months of your Medicare Part B coverage?  No    Healthy Habits:    Do you get at least three servings of calcium containing foods daily (dairy, green leafy vegetables, etc.)? yes    Amount of exercise or daily activities, outside of work: active    Problems taking medications regularly No    Medication side effects: No    Have you had an eye exam in the past two years? yes    Do you see a dentist twice per year? no    Do you have sleep apnea, excessive snoring or daytime drowsiness?    COGNITIVE SCREEN  1) Repeat 3 items (Banana, Sunrise, Chair)    2) Clock draw: NORMAL  3) 3 item recall: Recalls 3 objects  Results: 3 items recalled: COGNITIVE IMPAIRMENT LESS LIKELY    Mini-CogTM Copyright [employer] [place]. Licensed by the author for use in [facility] [employer]; reprinted with permission (tr@[facility]). All rights reserved.            Hypertension Follow-up      Outpatient blood pressures are being checked at store.  Results are 139/82 last week.    Low Salt Diet: monitoring salt       Reviewed and updated as needed this visit by clinical staff  Tobacco  Allergies  Meds  Med Hx  Surg Hx  Fam Hx  Soc Hx        Reviewed and updated as needed this visit by Provider        Social History   Substance Use Topics     Smoking status: Former Smoker     Types: Cigarettes     Quit date: 2/1/2011     Smokeless tobacco: Never Used     Alcohol use Yes      Comment: occ           Today's PHQ-2 Score:   PHQ-2 ( 1999 [employer]) 6/12/2017 5/18/2016   Q1: Little interest or pleasure in doing things 0 0   Q2: Feeling down, depressed or hopeless 0 0   PHQ-2 Score 0 0       Do you feel safe in your environment - Yes    Do you have a Health Care Directive?: No: Advance care planning was reviewed with patient; patient declined at this time.    Current providers sharing  "in care for this patient include:   Patient Care Team:  Kehr, Kristen M, PA-C as PCP - General      Hearing impairment: No    Ability to successfully perform activities of daily living: Yes, no assistance needed     Fall risk:  Fallen 2 or more times in the past year?: No  Any fall with injury in the past year?: No    Home safety:  none identified      The following health maintenance items are reviewed in Epic and correct as of today:  Health Maintenance   Topic Date Due     INFLUENZA VACCINE (SYSTEM ASSIGNED)  09/01/2017     FALL RISK ASSESSMENT  01/17/2018     ADVANCE DIRECTIVE PLANNING Q5 YRS  05/29/2018     EYE EXAM Q1 YEAR  06/05/2018     LIPID SCREEN Q5 YR FEMALE (SYSTEM ASSIGNED)  06/06/2022     TETANUS IMMUNIZATION (SYSTEM ASSIGNED)  05/18/2026     DEXA SCAN SCREENING (SYSTEM ASSIGNED)  Completed     PNEUMOCOCCAL  Completed         Pneumonia Vaccine: updated  Mammogram Screening: Mammo discussed, not appropriate for or declined by this patient.     ROS:  Constitutional, HEENT, cardiovascular, pulmonary, gi and gu systems are negative, except as otherwise noted.    Problem list, Medication list, Allergies, and Medical/Social/Surgical histories reviewed in [facility] and updated as appropriate.  OBJECTIVE:                                                            /88 (Cuff Size: Adult Regular)  Pulse 77  Temp 97.7  F (36.5  C) (Oral)  Ht 5' 4\" (1.626 m)  Wt 165 lb (74.8 kg)  SpO2 96%  BMI 28.32 kg/m2 Estimated body mass index is 28.32 kg/(m^2) as calculated from the following:    Height as of this encounter: 5' 4\" (1.626 m).    Weight as of this encounter: 165 lb (74.8 kg).  EXAM:   GENERAL APPEARANCE: healthy, alert and no distress  EYES: Eyes grossly normal to inspection, PERRL and conjunctivae and sclerae normal  HENT: ear canals and TM's normal, nose and mouth without ulcers or lesions, oropharynx clear and oral mucous membranes moist  NECK: no adenopathy, no asymmetry, masses, or scars and thyroid " normal to palpation  RESP: lungs clear to auscultation - no rales, rhonchi or wheezes  BREAST: normal without masses, tenderness or nipple discharge and no palpable axillary masses or adenopathy  CV: regular rate and rhythm, normal S1 S2, no S3 or S4, no murmur, click or rub, no peripheral edema and peripheral pulses strong  ABDOMEN: soft, nontender, no hepatosplenomegaly, no masses and bowel sounds normal  MS: no musculoskeletal defects are noted and gait is age appropriate without ataxia  SKIN: no suspicious lesions or rashes  NEURO: Normal strength and tone, sensory exam grossly normal, mentation intact and speech normal  PSYCH: mentation appears normal and affect normal/bright    Results for orders placed or performed in visit on 06/06/17   Basic metabolic panel  (Ca, Cl, CO2, Creat, Gluc, K, Na, BUN)   Result Value Ref Range    Sodium 138 133 - 144 mmol/L    Potassium 4.1 3.4 - 5.3 mmol/L    Chloride 102 94 - 109 mmol/L    Carbon Dioxide 29 20 - 32 mmol/L    Anion Gap 7 3 - 14 mmol/L    Glucose 91 70 - 99 mg/dL    Urea Nitrogen 18 7 - 30 mg/dL    Creatinine 0.79 0.52 - 1.04 mg/dL    GFR Estimate 70 >60 mL/min/1.7m2    GFR Estimate If Black 85 >60 mL/min/1.7m2    Calcium 9.2 8.5 - 10.1 mg/dL   Lipid Profile with reflex to direct LDL   Result Value Ref Range    Cholesterol 171 <200 mg/dL    Triglycerides 121 <150 mg/dL    HDL Cholesterol 75 >49 mg/dL    LDL Cholesterol Calculated 72 <100 mg/dL    Non HDL Cholesterol 96 <130 mg/dL   TSH with free T4 reflex   Result Value Ref Range    TSH 1.43 0.40 - 4.00 mU/L           ASSESSMENT / PLAN:                                                            1. Medicare annual wellness visit, subsequent  Health maintenance reviewed and updated.    2. Essential hypertension with goal blood pressure less than 140/90  Stable, refills given  - lisinopril-hydrochlorothiazide (PRINZIDE/ZESTORETIC) 20-25 MG per tablet; Take 0.5 tablets by mouth daily  Dispense: 90 tablet; Refill:  "1    3. Hyperlipidemia LDL goal <130  Stable, refills given  - simvastatin (ZOCOR) 20 MG tablet; Take 1 tablet (20 mg) by mouth daily  Dispense: 90 tablet; Refill: 3    4. Other specified hypothyroidism  Stable, refills given  - levothyroxine (SYNTHROID/LEVOTHROID) 75 MCG tablet; Take 1 tablet (75 mcg) by mouth daily  Dispense: 90 tablet; Refill: 3    End of Life Planning:  Patient currently has an advanced directive: Yes.  Practitioner is supportive of decision.    COUNSELING:  Reviewed preventive health counseling, as reflected in patient instructions       Regular exercise       Healthy diet/nutrition      Estimated body mass index is 28.32 kg/(m^2) as calculated from the following:    Height as of this encounter: 5' 4\" (1.626 m).    Weight as of this encounter: 165 lb (74.8 kg).     reports that she quit smoking about 6 years ago. Her smoking use included Cigarettes. She has never used smokeless tobacco.      Appropriate preventive services were discussed with this patient, including applicable screening as appropriate for cardiovascular disease, diabetes, osteopenia/osteoporosis, and glaucoma.  As appropriate for age/gender, discussed screening for colorectal cancer, prostate cancer, breast cancer, and cervical cancer. Checklist reviewing preventive services available has been given to the patient.    Reviewed patients plan of care and provided an AVS. The Basic Care Plan (routine screening as documented in Health Maintenance) for Claire meets the Care Plan requirement. This Care Plan has been established and reviewed with the Patient.    Counseling Resources:  ATP IV Guidelines  Pooled Cohorts Equation Calculator  Breast Cancer Risk Calculator  FRAX Risk Assessment  ICSI Preventive Guidelines  Dietary Guidelines for Americans, 2010  USDA's MyPlate  ASA Prophylaxis  Lung CA Screening    Kristen M. Kehr, PA-C  [facility]  "

## 2017-06-12 NOTE — NURSING NOTE
"Chief Complaint   Patient presents with     Wellness Visit       Initial /88 (Cuff Size: Adult Regular)  Pulse 77  Temp 97.7  F (36.5  C) (Oral)  Ht 5' 4\" (1.626 m)  Wt 165 lb (74.8 kg)  SpO2 96%  BMI 28.32 kg/m2 Estimated body mass index is 28.32 kg/(m^2) as calculated from the following:    Height as of this encounter: 5' 4\" (1.626 m).    Weight as of this encounter: 165 lb (74.8 kg).  Medication Reconciliation: complete    ARABELLA Shah MA    "

## 2017-06-12 NOTE — MR AVS SNAPSHOT
After Visit Summary   6/12/2017    Claire Moore    MRN: [id number]           Patient Information     Date Of Birth          1940        Visit Information        Provider Department      6/12/2017 9:00 AM Kehr, Kristen M, PA-C [facility] [place]        Today's Diagnoses     Medicare annual wellness visit, subsequent    -  1    Essential hypertension with goal blood pressure less than 140/90        Hyperlipidemia LDL goal <130        Other specified hypothyroidism          Care Instructions      Preventive Health Recommendations    Female Ages 65 +    Yearly exam:     See your health care provider every year in order to  o Review health changes.   o Discuss preventive care.    o Review your medicines if your doctor has prescribed any.      You no longer need a yearly Pap test unless you've had an abnormal Pap test in the past 10 years. If you have vaginal symptoms, such as bleeding or discharge, be sure to talk with your provider about a Pap test.      Every 1 to 2 years, have a mammogram.  If you are over 69, talk with your health care provider about whether or not you want to continue having screening mammograms.      Every 10 years, have a colonoscopy. Or, have a yearly FIT test (stool test). These exams will check for colon cancer.       Have a cholesterol test every 5 years, or more often if your doctor advises it.       Have a diabetes test (fasting glucose) every three years. If you are at risk for diabetes, you should have this test more often.       At age 65, have a bone density scan (DEXA) to check for osteoporosis (brittle bone disease).    Shots:    Get a flu shot each year.    Get a tetanus shot every 10 years.    Talk to your doctor about your pneumonia vaccines. There are now two you should receive - Pneumovax (PPSV 23) and Prevnar (PCV 13).    Talk to your doctor about the shingles vaccine.    Talk to your doctor about the hepatitis B vaccine.    Nutrition:     Eat at least 5  "servings of fruits and vegetables each day.      Eat whole-grain bread, whole-wheat pasta and brown rice instead of white grains and rice.      Talk to your provider about Calcium and Vitamin D.     Lifestyle    Exercise at least 150 minutes a week (30 minutes a day, 5 days a week). This will help you control your weight and prevent disease.      Limit alcohol to one drink per day.      No smoking.       Wear sunscreen to prevent skin cancer.       See your dentist twice a year for an exam and cleaning.      See your eye doctor every 1 to 2 years to screen for conditions such as glaucoma, macular degeneration and cataracts.          Follow-ups after your visit        Who to contact     If you have questions or need follow up information about today's clinic visit or your schedule please contact [facility] directly at [phone].  Normal or non-critical lab and imaging results will be communicated to you by Eureksterhart, letter or phone within 4 business days after the clinic has received the results. If you do not hear from us within 7 days, please contact the clinic through Eureksterhart or phone. If you have a critical or abnormal lab result, we will notify you by phone as soon as possible.  Submit refill requests through Shoeboxed or call your pharmacy and they will forward the refill request to us. Please allow 3 business days for your refill to be completed.          Additional Information About Your Visit        Shoeboxed Information     Shoeboxed lets you send messages to your doctor, view your test results, renew your prescriptions, schedule appointments and more. To sign up, go to [email or web address] . Click on \"Log in\" on the left side of the screen, which will take you to the Welcome page. Then click on \"Sign up Now\" on the right side of the page.     You will be asked to enter the access code listed below, as well as some personal information. Please follow the directions to create your username and " "password.     Your access code is: [email or web address]  Expires: 2017  9:06 AM     Your access code will  in 90 days. If you need help or a new code, please call your [facility] clinic or [phone].        Care EveryWhere ID     This is your Care EveryWhere ID. This could be used by other organizations to access your [facility] medical records  [id number]        Your Vitals Were     Pulse Temperature Height Pulse Oximetry BMI (Body Mass Index)       77 97.7  F (36.5  C) (Oral) 5' 4\" (1.626 m) 96% 28.32 kg/m2        Blood Pressure from Last 3 Encounters:   17 172/88   17 150/84   17 109/52    Weight from Last 3 Encounters:   17 165 lb (74.8 kg)   17 160 lb (72.6 kg)   17 174 lb (78.9 kg)              Today, you had the following     No orders found for display         Today's Medication Changes          These changes are accurate as of: 17  9:40 AM.  If you have any questions, ask your nurse or doctor.               These medicines have changed or have updated prescriptions.        Dose/Directions    levothyroxine 75 MCG tablet   Commonly known as:  SYNTHROID/LEVOTHROID   This may have changed:  See the new instructions.   Used for:  Other specified hypothyroidism   Changed by:  Kehr, Kristen M, PA-C        Dose:  75 mcg   Take 1 tablet (75 mcg) by mouth daily   Quantity:  90 tablet   Refills:  3       lisinopril-hydrochlorothiazide 20-25 MG per tablet   Commonly known as:  PRINZIDE/ZESTORETIC   This may have changed:  additional instructions   Used for:  Essential hypertension with goal blood pressure less than 140/90   Changed by:  Kehr, Kristen M, PA-C        Dose:  0.5 tablet   Take 0.5 tablets by mouth daily   Quantity:  90 tablet   Refills:  1            Where to get your medicines      These medications were sent to [facility] - [address]  [address] [place] [address]     Phone:  [phone]     levothyroxine 75 MCG tablet "    lisinopril-hydrochlorothiazide 20-25 MG per tablet    simvastatin 20 MG tablet                Primary Care Provider Office Phone # Fax #    Kristen M Kehr, PA-C [phone]       [facility] [address] [facility] [address]        Thank you!     Thank you for choosing [facility]  for your care. Our goal is always to provide you with excellent care. Hearing back from our patients is one way we can continue to improve our services. Please take a few minutes to complete the written survey that you may receive in the mail after your visit with us. Thank you!             Your Updated Medication List - Protect others around you: Learn how to safely use, store and throw away your medicines at www.disposemymeds.org.          This list is accurate as of: 6/12/17  9:40 AM.  Always use your most recent med list.                   Brand Name Dispense Instructions for use    aspirin 81 MG tablet      Take  by mouth daily.       CALCIUM 500 PO      1 per day       cholecalciferol 1000 UNIT tablet    vitamin D     Take 1 tablet by mouth daily.       levothyroxine 75 MCG tablet    SYNTHROID/LEVOTHROID    90 tablet    Take 1 tablet (75 mcg) by mouth daily       lisinopril-hydrochlorothiazide 20-25 MG per tablet    PRINZIDE/ZESTORETIC    90 tablet    Take 0.5 tablets by mouth daily       MULTI-DAY VITAMINS PO      Take  by mouth. 1 tab daily       simvastatin 20 MG tablet    ZOCOR    90 tablet    Take 1 tablet (20 mg) by mouth daily

## 2017-08-02 ENCOUNTER — TELEPHONE (OUTPATIENT)
Dept: FAMILY MEDICINE | Facility: CLINIC | Age: 77
End: 2017-08-02

## 2017-08-02 NOTE — TELEPHONE ENCOUNTER
Panel Management Review      Patient has the following on her problem list:     Hypertension   Last three blood pressure readings:  BP Readings from Last 3 Encounters:   06/12/17 172/88   05/01/17 150/84   03/30/17 109/52     Blood pressure: FAILED    HTN Guidelines:  Age 18-59 BP range:  Less than 140/90  Age 60-85 with Diabetes:  Less than 140/90  Age 60-85 without Diabetes:  less than 150/90        Composite cancer screening  Chart review shows that this patient is due/due soon for the following None  Summary:    Patient is due/failing the following:   BP CHECK    Action needed:   Need to come in for blood pressure recheck with ancillary or walk into our [facility] pharmacy.    Type of outreach:    Sent letter.    Questions for provider review:    None                                                                                                                                    Uriah MARTIN MA       Chart routed to close .

## 2017-08-02 NOTE — LETTER
Claire Moore  [address]  [place] [address]          August 2, 2017          Dear Claire Moore      Our records indicate that your blood pressure was elevated on 06/12/17. Please schedule a(n) blood pressure recheck with Ancillary or walk into our [facility] pharmacy. Monitoring and managing your preventative and chronic health conditions are very important to us.       If you have received your health care elsewhere, please provide us with that information so it can be documented in your chart.    Please call [phone] or message us through your [employer] account to schedule an appointment or provide information for your chart.     We look forward to seeing you and working with you on your health care needs.     Sincerely,     Uriah MARTIN MA            *If you have already scheduled an appointment, please disregard this reminder

## 2017-08-09 ENCOUNTER — ALLIED HEALTH/NURSE VISIT (OUTPATIENT)
Dept: FAMILY MEDICINE | Facility: CLINIC | Age: 77
End: 2017-08-09
Payer: COMMERCIAL

## 2017-08-09 VITALS — HEART RATE: 84 BPM | SYSTOLIC BLOOD PRESSURE: 158 MMHG | DIASTOLIC BLOOD PRESSURE: 88 MMHG

## 2017-08-09 DIAGNOSIS — Z01.30 BP CHECK: Primary | ICD-10-CM

## 2017-08-09 PROCEDURE — 99207 ZZC NO CHARGE NURSE ONLY: CPT | Performed by: PHYSICIAN ASSISTANT

## 2017-08-09 NOTE — MR AVS SNAPSHOT
"              After Visit Summary   2017    Claire Moore    MRN: [id number]           Patient Information     Date Of Birth          1940        Visit Information        Provider Department      2017 9:52 AM Kehr, Kristen M, PA-C [facility]        Today's Diagnoses     BP check    -  1       Follow-ups after your visit        Who to contact     If you have questions or need follow up information about today's clinic visit or your schedule please contact [facility] directly at [phone].  Normal or non-critical lab and imaging results will be communicated to you by Peach & Lilyhart, letter or phone within 4 business days after the clinic has received the results. If you do not hear from us within 7 days, please contact the clinic through Peach & Lilyhart or phone. If you have a critical or abnormal lab result, we will notify you by phone as soon as possible.  Submit refill requests through Polar or call your pharmacy and they will forward the refill request to us. Please allow 3 business days for your refill to be completed.          Additional Information About Your Visit        MyChart Information     Polar lets you send messages to your doctor, view your test results, renew your prescriptions, schedule appointments and more. To sign up, go to [email or web address] . Click on \"Log in\" on the left side of the screen, which will take you to the Welcome page. Then click on \"Sign up Now\" on the right side of the page.     You will be asked to enter the access code listed below, as well as some personal information. Please follow the directions to create your username and password.     Your access code is: [email or web address]  Expires: 2017  9:55 AM     Your access code will  in 90 days. If you need help or a new code, please call your [facility] or [phone].        Care EveryWhere ID     This is your Care EveryWhere ID. This could be used by other organizations to access " your [facility] medical records  [id number]        Your Vitals Were     Pulse                   84            Blood Pressure from Last 3 Encounters:   08/09/17 158/88   06/12/17 172/88   05/01/17 150/84    Weight from Last 3 Encounters:   06/12/17 165 lb (74.8 kg)   03/23/17 160 lb (72.6 kg)   03/20/17 174 lb (78.9 kg)              Today, you had the following     No orders found for display       Primary Care Provider Office Phone # Fax #    Kristen M Kehr, PA-C [phone] [address] [address] [address]        Equal Access to Services     [facility] LILY : Hadii francesca gonsalezo Soshayy, waaxda luqadaha, qaybta kaalmada adesuhasyada, israel francisco . So [facility] [phone].    ATENCIÓN: Si habla español, tiene a khan disposición servicios gratuitos de asistencia lingüística. Llame al [phone].    We comply with applicable federal civil rights laws and [place] laws. We do not discriminate on the basis of race, color, national origin, age, disability sex, sexual orientation or gender identity.            Thank you!     Thank you for choosing [facility]  for your care. Our goal is always to provide you with excellent care. Hearing back from our patients is one way we can continue to improve our services. Please take a few minutes to complete the written survey that you may receive in the mail after your visit with us. Thank you!             Your Updated Medication List - Protect others around you: Learn how to safely use, store and throw away your medicines at www.disposemymeds.org.          This list is accurate as of: 8/9/17  9:55 AM.  Always use your most recent med list.                   Brand Name Dispense Instructions for use Diagnosis    aspirin 81 MG tablet      Take  by mouth daily.        CALCIUM 500 PO      1 per day        cholecalciferol 1000 UNIT tablet    vitamin D     Take 1 tablet by mouth daily.        levothyroxine 75 MCG tablet     SYNTHROID/LEVOTHROID    90 tablet    Take 1 tablet (75 mcg) by mouth daily    Other specified hypothyroidism       lisinopril-hydrochlorothiazide 20-25 MG per tablet    PRINZIDE/ZESTORETIC    90 tablet    Take 0.5 tablets by mouth daily    Essential hypertension with goal blood pressure less than 140/90       MULTI-DAY VITAMINS PO      Take  by mouth. 1 tab daily        simvastatin 20 MG tablet    ZOCOR    90 tablet    Take 1 tablet (20 mg) by mouth daily    Hyperlipidemia LDL goal <130

## 2017-08-09 NOTE — Clinical Note
Routing message to PCP for review -BP checked at pharmacy and noted to be above goal - 158/88 pulse 84. Recommended patient follow-up with PCP.  Jose Angel Batista RPh. [facility] [phone]

## 2017-08-09 NOTE — PROGRESS NOTES
Claire Moore is enrolled/participating in the retail pharmacy Blood Pressure Goals Achievement Program (BPGAP).  Claire Moore was evaluated at [facility] on August 9, 2017 at which time her blood pressure was:    BP Readings from Last 3 Encounters:   08/09/17 158/88   06/12/17 172/88   05/01/17 150/84     Reviewed lifestyle modifications for blood pressure control and reduction: including making healthy food choices, managing weight, getting regular exercise, smoking cessation, reducing alcohol consumption, monitoring blood pressure regularly.     Claire Moore is not experiencing symptoms.    Follow-Up: BP is not at goal of < 140/90mmHg (patient 18+ years of age with or without diabetes), Recommended follow-up with PCP.  Routing to PCP for further review.    Recommendation to Provider: Kristen Kehr    Claire Moore was evaluated for enrollment into the PGEN study today.    Patient eligible for enrollment:  No  Patient interested in enrollment:  No    Completed by: Jose Angel Batista [facility].  [facility]  [phone]        BLOOD PRESSURE REVIEWED AND BETTER THAN AT HER APPOINTMENT.   I WILL HAVE HER CONTINUE WITH HER CURRENT MEDICATIONS. NO CHANGES.   Kristen Kehr PA-C  August 22, 2017

## 2017-11-15 ENCOUNTER — TELEPHONE (OUTPATIENT)
Dept: FAMILY MEDICINE | Facility: CLINIC | Age: 77
End: 2017-11-15

## 2017-11-15 NOTE — TELEPHONE ENCOUNTER
Panel Management Review      Patient has the following on her problem list:     Hypertension   Last three blood pressure readings:  BP Readings from Last 3 Encounters:   08/09/17 158/88   06/12/17 172/88   05/01/17 150/84     Blood pressure: MONITOR    HTN Guidelines:  Age 18-59 BP range:  Less than 140/90  Age 60-85 with Diabetes:  Less than 140/90  Age 60-85 without Diabetes:  less than 150/90        Composite cancer screening  Chart review shows that this patient is due/due soon for the following None  Summary:    Patient is due/failing the following:   BP CHECK    Action needed:   Per Kristen Kehr's note, patient will continued her med dose. Patient had her bp check with pharmacy on 08/09/17.    Type of outreach:    none    Questions for provider review:    None                                                                                                                                    Uriah MARTIN MA       Chart routed to close .

## 2018-02-06 ENCOUNTER — TRANSFERRED RECORDS (OUTPATIENT)
Dept: HEALTH INFORMATION MANAGEMENT | Facility: CLINIC | Age: 78
End: 2018-02-06

## 2018-02-07 ENCOUNTER — HOSPITAL ENCOUNTER (INPATIENT)
Facility: CLINIC | Age: 78
LOS: 1 days | Discharge: HOME | DRG: 339 | End: 2018-02-09
Attending: FAMILY MEDICINE | Admitting: SURGERY
Payer: COMMERCIAL

## 2018-02-07 ENCOUNTER — APPOINTMENT (OUTPATIENT)
Dept: CT IMAGING | Facility: CLINIC | Age: 78
DRG: 339 | End: 2018-02-07
Attending: FAMILY MEDICINE
Payer: COMMERCIAL

## 2018-02-07 DIAGNOSIS — K35.209 ACUTE APPENDICITIS WITH GENERALIZED PERITONITIS: ICD-10-CM

## 2018-02-07 DIAGNOSIS — K40.20 BILATERAL INGUINAL HERNIA WITHOUT OBSTRUCTION OR GANGRENE, RECURRENCE NOT SPECIFIED: ICD-10-CM

## 2018-02-07 PROBLEM — K37 APPENDICITIS: Status: ACTIVE | Noted: 2018-02-07

## 2018-02-07 LAB
ALBUMIN UR-MCNC: 30 MG/DL
ANION GAP SERPL CALCULATED.3IONS-SCNC: 9 MMOL/L (ref 3–14)
APPEARANCE UR: ABNORMAL
BASOPHILS # BLD AUTO: 0.1 10E9/L (ref 0–0.2)
BASOPHILS NFR BLD AUTO: 0.2 %
BILIRUB UR QL STRIP: NEGATIVE
BUN SERPL-MCNC: 17 MG/DL (ref 7–30)
CALCIUM SERPL-MCNC: 8.7 MG/DL (ref 8.5–10.1)
CHLORIDE SERPL-SCNC: 97 MMOL/L (ref 94–109)
CO2 SERPL-SCNC: 21 MMOL/L (ref 20–32)
COLOR UR AUTO: ABNORMAL
CREAT SERPL-MCNC: 0.78 MG/DL (ref 0.52–1.04)
DIFFERENTIAL METHOD BLD: ABNORMAL
EOSINOPHIL # BLD AUTO: 0.1 10E9/L (ref 0–0.7)
EOSINOPHIL NFR BLD AUTO: 0.5 %
ERYTHROCYTE [DISTWIDTH] IN BLOOD BY AUTOMATED COUNT: 14 % (ref 10–15)
GFR SERPL CREATININE-BSD FRML MDRD: 71 ML/MIN/1.7M2
GLUCOSE SERPL-MCNC: 111 MG/DL (ref 70–99)
GLUCOSE UR STRIP-MCNC: NEGATIVE MG/DL
HCT VFR BLD AUTO: 40 % (ref 35–47)
HGB BLD-MCNC: 13.9 G/DL (ref 11.7–15.7)
HGB UR QL STRIP: NEGATIVE
HYALINE CASTS #/AREA URNS LPF: 31 /LPF (ref 0–2)
IMM GRANULOCYTES # BLD: 0.1 10E9/L (ref 0–0.4)
IMM GRANULOCYTES NFR BLD: 0.4 %
KETONES UR STRIP-MCNC: NEGATIVE MG/DL
LACTATE BLD-SCNC: 0.6 MMOL/L (ref 0.7–2)
LEUKOCYTE ESTERASE UR QL STRIP: ABNORMAL
LYMPHOCYTES # BLD AUTO: 1.2 10E9/L (ref 0.8–5.3)
LYMPHOCYTES NFR BLD AUTO: 4.7 %
MCH RBC QN AUTO: 30.2 PG (ref 26.5–33)
MCHC RBC AUTO-ENTMCNC: 34.8 G/DL (ref 31.5–36.5)
MCV RBC AUTO: 87 FL (ref 78–100)
MONOCYTES # BLD AUTO: 1.7 10E9/L (ref 0–1.3)
MONOCYTES NFR BLD AUTO: 6.8 %
MUCOUS THREADS #/AREA URNS LPF: PRESENT /LPF
NEUTROPHILS # BLD AUTO: 22.1 10E9/L (ref 1.6–8.3)
NEUTROPHILS NFR BLD AUTO: 87.4 %
NITRATE UR QL: NEGATIVE
PH UR STRIP: 5 PH (ref 5–7)
PLATELET # BLD AUTO: 246 10E9/L (ref 150–450)
POTASSIUM SERPL-SCNC: 4.4 MMOL/L (ref 3.4–5.3)
RBC # BLD AUTO: 4.61 10E12/L (ref 3.8–5.2)
RBC #/AREA URNS AUTO: 3 /HPF (ref 0–2)
SODIUM SERPL-SCNC: 127 MMOL/L (ref 133–144)
SOURCE: ABNORMAL
SP GR UR STRIP: 1.02 (ref 1–1.03)
SQUAMOUS #/AREA URNS AUTO: 3 /HPF (ref 0–1)
UROBILINOGEN UR STRIP-MCNC: 2 MG/DL (ref 0–2)
WBC # BLD AUTO: 25.3 10E9/L (ref 4–11)
WBC #/AREA URNS AUTO: 14 /HPF (ref 0–2)

## 2018-02-07 PROCEDURE — 99285 EMERGENCY DEPT VISIT HI MDM: CPT | Mod: 25 | Performed by: FAMILY MEDICINE

## 2018-02-07 PROCEDURE — 25000128 H RX IP 250 OP 636: Performed by: FAMILY MEDICINE

## 2018-02-07 PROCEDURE — G0378 HOSPITAL OBSERVATION PER HR: HCPCS

## 2018-02-07 PROCEDURE — 96375 TX/PRO/DX INJ NEW DRUG ADDON: CPT

## 2018-02-07 PROCEDURE — 25000125 ZZHC RX 250: Performed by: FAMILY MEDICINE

## 2018-02-07 PROCEDURE — 88304 TISSUE EXAM BY PATHOLOGIST: CPT | Mod: 26 | Performed by: SURGERY

## 2018-02-07 PROCEDURE — 81001 URINALYSIS AUTO W/SCOPE: CPT | Performed by: FAMILY MEDICINE

## 2018-02-07 PROCEDURE — 83605 ASSAY OF LACTIC ACID: CPT | Performed by: FAMILY MEDICINE

## 2018-02-07 PROCEDURE — 96365 THER/PROPH/DIAG IV INF INIT: CPT

## 2018-02-07 PROCEDURE — 36415 COLL VENOUS BLD VENIPUNCTURE: CPT | Performed by: FAMILY MEDICINE

## 2018-02-07 PROCEDURE — 80048 BASIC METABOLIC PNL TOTAL CA: CPT | Performed by: FAMILY MEDICINE

## 2018-02-07 PROCEDURE — 99285 EMERGENCY DEPT VISIT HI MDM: CPT | Mod: 25

## 2018-02-07 PROCEDURE — 88304 TISSUE EXAM BY PATHOLOGIST: CPT | Performed by: SURGERY

## 2018-02-07 PROCEDURE — 74177 CT ABD & PELVIS W/CONTRAST: CPT

## 2018-02-07 PROCEDURE — 85025 COMPLETE CBC W/AUTO DIFF WBC: CPT | Performed by: FAMILY MEDICINE

## 2018-02-07 PROCEDURE — 87040 BLOOD CULTURE FOR BACTERIA: CPT | Performed by: FAMILY MEDICINE

## 2018-02-07 RX ORDER — IOPAMIDOL 755 MG/ML
74 INJECTION, SOLUTION INTRAVASCULAR ONCE
Status: COMPLETED | OUTPATIENT
Start: 2018-02-07 | End: 2018-02-07

## 2018-02-07 RX ORDER — NALOXONE HYDROCHLORIDE 0.4 MG/ML
.1-.4 INJECTION, SOLUTION INTRAMUSCULAR; INTRAVENOUS; SUBCUTANEOUS
Status: DISCONTINUED | OUTPATIENT
Start: 2018-02-07 | End: 2018-02-08

## 2018-02-07 RX ORDER — KETOROLAC TROMETHAMINE 30 MG/ML
15 INJECTION, SOLUTION INTRAMUSCULAR; INTRAVENOUS ONCE
Status: COMPLETED | OUTPATIENT
Start: 2018-02-07 | End: 2018-02-07

## 2018-02-07 RX ORDER — HYDROMORPHONE HCL/0.9% NACL/PF 0.2MG/0.2
0.2 SYRINGE (ML) INTRAVENOUS
Status: DISCONTINUED | OUTPATIENT
Start: 2018-02-07 | End: 2018-02-08

## 2018-02-07 RX ORDER — ONDANSETRON 4 MG/1
4 TABLET, ORALLY DISINTEGRATING ORAL EVERY 6 HOURS PRN
Status: DISCONTINUED | OUTPATIENT
Start: 2018-02-07 | End: 2018-02-08

## 2018-02-07 RX ORDER — AMPICILLIN AND SULBACTAM 1; .5 G/1; G/1
1.5 INJECTION, POWDER, FOR SOLUTION INTRAMUSCULAR; INTRAVENOUS EVERY 6 HOURS
Status: DISCONTINUED | OUTPATIENT
Start: 2018-02-07 | End: 2018-02-08 | Stop reason: DRUGHIGH

## 2018-02-07 RX ORDER — ONDANSETRON 2 MG/ML
4 INJECTION INTRAMUSCULAR; INTRAVENOUS EVERY 6 HOURS PRN
Status: DISCONTINUED | OUTPATIENT
Start: 2018-02-07 | End: 2018-02-08

## 2018-02-07 RX ADMIN — KETOROLAC TROMETHAMINE 15 MG: 30 INJECTION, SOLUTION INTRAMUSCULAR at 20:56

## 2018-02-07 RX ADMIN — IOPAMIDOL 74 ML: 755 INJECTION, SOLUTION INTRAVENOUS at 18:26

## 2018-02-07 RX ADMIN — SODIUM CHLORIDE 58 ML: 9 INJECTION, SOLUTION INTRAVENOUS at 18:26

## 2018-02-07 RX ADMIN — AMPICILLIN SODIUM AND SULBACTAM SODIUM 1.5 G: 1; .5 INJECTION, POWDER, FOR SOLUTION INTRAMUSCULAR; INTRAVENOUS at 20:24

## 2018-02-07 NOTE — IP AVS SNAPSHOT
MRN:[id number]                      After Visit Summary   2/7/2018    Claire Moore    MRN: [id number]           Thank you!     Thank you for choosing [facility] for your care. Our goal is always to provide you with excellent care. Hearing back from our patients is one way we can continue to improve our services. Please take a few minutes to complete the written survey that you may receive in the mail after you visit with us. Thank you!        Patient Information     Date Of Birth          1940        Designated Caregiver       Most Recent Value    Caregiver    Will someone help with your care after discharge? yes    Name of designated caregiver Niurka    Phone number of caregiver [phone]    Caregiver address [address] [place] [address]      About your hospital stay     You were admitted on:  February 7, 2018 You last received care in the:  [facility]    You were discharged on:  February 9, 2018       Who to Call     For medical emergencies, please call 911.  For non-urgent questions about your medical care, please call your primary care provider or clinic, [phone]  For questions related to your surgery, please call your surgery clinic        Attending Provider     Provider Specialty    Darian Bhakta MD Emergency Medicine    [facility]    Glenn Pacheco MD General Surgery       Primary Care Provider Office Phone # Fax #    Kristen M Kehr, PA-C [phone]      Further instructions from your care team       Wash incisions daily with soap and water. Some mild redness or swelling is expected. If draining, cover with dry gauze.    No heavy lifting (less than 30 pounds) for 1 month.    Okay to use ice pack over wound as necessary for comfort.    Ibuprofen or Tylenol for post-op pain.    Diet as tolerated. No restrictions.    Follow up with Dr Pacheco in 1-2 weeks.              Pending Results     Date and Time  "Order Name Status Description    2018 1805 Blood culture (one site) Preliminary     2018 1805 Blood culture (one site) Preliminary             Admission Information     Date & Time Provider Department Dept. Phone    2018 [phone]      Your Vitals Were     Blood Pressure Pulse Temperature Respirations Height Weight    143/68 85 96.7  F (35.9  C) (Oral) 18 1.626 m (5' 4\") 68 kg (150 lb)    Pulse Oximetry BMI (Body Mass Index)                92% 25.75 kg/m2          MyChart Information     [employer] lets you send messages to your doctor, view your test results, renew your prescriptions, schedule appointments and more. To sign up, go to [email or web address] . Click on \"Log in\" on the left side of the screen, which will take you to the Welcome page. Then click on \"Sign up Now\" on the right side of the page.     You will be asked to enter the access code listed below, as well as some personal information. Please follow the directions to create your username and password.     Your access code is: [email or web address]  Expires: 5/10/2018  9:55 AM     Your access code will  in 90 days. If you need help or a new code, please call your [place] clinic or [phone].        Care EveryWhere ID     This is your Care EveryWhere ID. This could be used by other organizations to access your [place] medical records  [id number]        Equal Access to Services     [facility] AH: Hadii aad ku hadasho Soomaali, waaxda luqadaha, qaybta kaalmada adeegyada, waxay gadiel francisco . So [facility] [phone].    ATENCIÓN: Si habla español, tiene a khan disposición servicios gratuitos de asistencia lingüística. Llame al [phone].    We comply with applicable federal civil rights laws and [place] laws. We do not discriminate on the basis of race, color, national origin, age, disability, sex, sexual orientation, or gender identity.               Review of " your medicines      UNREVIEWED medicines. Ask your doctor about these medicines        Dose / Directions    CALCIUM 500 PO        1 per day   Refills:  0       cholecalciferol 1000 UNIT tablet   Commonly known as:  vitamin D3        Dose:  1 tablet   Take 1 tablet by mouth daily.   Refills:  0       levothyroxine 75 MCG tablet   Commonly known as:  SYNTHROID/LEVOTHROID   Used for:  Other specified hypothyroidism        Dose:  75 mcg   Take 1 tablet (75 mcg) by mouth daily   Quantity:  90 tablet   Refills:  3       lisinopril-hydrochlorothiazide 20-25 MG per tablet   Commonly known as:  PRINZIDE/ZESTORETIC   Used for:  Essential hypertension with goal blood pressure less than 140/90        Dose:  0.5 tablet   Take 0.5 tablets by mouth daily   Quantity:  90 tablet   Refills:  1       MULTI-DAY VITAMINS PO        Take  by mouth. 1 tab daily   Refills:  0       simvastatin 20 MG tablet   Commonly known as:  ZOCOR   Used for:  Hyperlipidemia LDL goal <130        Dose:  20 mg   Take 1 tablet (20 mg) by mouth daily   Quantity:  90 tablet   Refills:  3         START taking        Dose / Directions    amoxicillin-clavulanate 500-125 MG per tablet   Commonly known as:  AUGMENTIN   Indication:  Infection Within the Abdomen, appy        Dose:  1 tablet   Take 1 tablet by mouth every 8 hours   Quantity:  30 tablet   Refills:  0         CONTINUE these medicines which have NOT CHANGED        Dose / Directions    aspirin 81 MG tablet        Take  by mouth daily.   Refills:  0            Where to get your medicines      These medications were sent to [facility] [place], [place] - [address]  [address] [address]     Phone:  [phone]     amoxicillin-clavulanate 500-125 MG per tablet                Protect others around you: Learn how to safely use, store and throw away your medicines at www.disposemymeds.org.        ANTIBIOTIC INSTRUCTION     You've Been Prescribed an Antibiotic - Now  What?  Your healthcare team thinks that you or your loved one might have an infection. Some infections can be treated with antibiotics, which are powerful, life-saving drugs. Like all medications, antibiotics have side effects and should only be used when necessary. There are some important things you should know about your antibiotic treatment.      Your healthcare team may run tests before you start taking an antibiotic.    Your team may take samples (e.g., from your blood, urine or other areas) to run tests to look for bacteria. These test can be important to determine if you need an antibiotic at all and, if you do, which antibiotic will work best.      Within a few days, your healthcare team might change or even stop your antibiotic.    Your team may start you on an antibiotic while they are working to find out what is making you sick.    Your team might change your antibiotic because test results show that a different antibiotic would be better to treat your infection.    In some cases, once your team has more information, they learn that you do not need an antibiotic at all. They may find out that you don't have an infection, or that the antibiotic you're taking won't work against your infection. For example, an infection caused by a virus can't be treated with antibiotics. Staying on an antibiotic when you don't need it is more likely to be harmful than helpful.      You may experience side effects from your antibiotic.    Like all medications, antibiotics have side effects. Some of these can be serious.    Let you healthcare team know if you have any known allergies when you are admitted to the hospital.    One significant side effect of nearly all antibiotics is the risk of severe and sometimes deadly diarrhea caused by Clostridium difficile (C. Difficile). This occurs when a person takes antibiotics because some good germs are destroyed. Antibiotic use allows C. diificile to take over, putting patients at  high risk for this serious infection.    As a patient or caregiver, it is important to understand your or your loved one's antibiotic treatment. It is especially important for caregivers to speak up when patients can't speak for themselves. Here are some important questions to ask your healthcare team.    What infection is this antibiotic treating and how do you know I have that infection?    What side effects might occur from this antibiotic?    How long will I need to take this antibiotic?    Is it safe to take this antibiotic with other medications or supplements (e.g., vitamins) that I am taking?     Are there any special directions I need to know about taking this antibiotic? For example, should I take it with food?    How will I be monitored to know whether my infection is responding to the antibiotic?    What tests may help to make sure the right antibiotic is prescribed for me?      Information provided by:  www.cdc.gov/getsmart  U.S. Department of Health and Human Services  Centers for disease Control and Prevention  National Center for Emerging and Zoonotic Infectious Diseases  Division of Healthcare Quality Promotion             Medication List: This is a list of all your medications and when to take them. Check marks below indicate your daily home schedule. Keep this list as a reference.      Medications           Morning Afternoon Evening Bedtime As Needed    amoxicillin-clavulanate 500-125 MG per tablet   Commonly known as:  AUGMENTIN   Take 1 tablet by mouth every 8 hours   Last time this was given:  1 tablet on 2/9/2018  9:00 AM                                aspirin 81 MG tablet   Take  by mouth daily.                                CALCIUM 500 PO   1 per day                                cholecalciferol 1000 UNIT tablet   Commonly known as:  vitamin D3   Take 1 tablet by mouth daily.   Last time this was given:  1,000 Units on 2/9/2018  9:00 AM                                levothyroxine 75 MCG  tablet   Commonly known as:  SYNTHROID/LEVOTHROID   Take 1 tablet (75 mcg) by mouth daily   Last time this was given:  75 mcg on 2/9/2018  6:41 AM                                lisinopril-hydrochlorothiazide 20-25 MG per tablet   Commonly known as:  PRINZIDE/ZESTORETIC   Take 0.5 tablets by mouth daily                                MULTI-DAY VITAMINS PO   Take  by mouth. 1 tab daily                                simvastatin 20 MG tablet   Commonly known as:  ZOCOR   Take 1 tablet (20 mg) by mouth daily   Last time this was given:  20 mg on 2/8/2018  5:42 PM

## 2018-02-07 NOTE — IP AVS SNAPSHOT
[facility]    [address] [address]    Phone:  [phone]    Fax:  [phone]                                       After Visit Summary   2/7/2018    Claire Moore    MRN: [id number]           After Visit Summary Signature Page     I have received my discharge instructions, and my questions have been answered. I have discussed any challenges I see with this plan with the nurse or doctor.    ..........................................................................................................................................  Patient/Patient Representative Signature      ..........................................................................................................................................  Patient Representative Print Name and Relationship to Patient    ..................................................               ................................................  Date                                            Time    ..........................................................................................................................................  Reviewed by Signature/Title    ...................................................              ..............................................  Date                                                            Time

## 2018-02-07 NOTE — ED PROVIDER NOTES
HPI  Patient is a 77-year-old female presenting with right lower quadrant abdominal pain.  Past medical history is reviewed.  Medications are reviewed.  She denies any recent medication changes.  Only abdominal surgery was a tubal ligation and this was in the distant past.  Not a smoker.  No recent alcohol.  No drug use.    The patient has had some brief, vague intermittent right side pain over the past month.  She attributes this to moving and lifting/pushing/pulling objects during that process.  However, she was without pain for the majority of the time.  She recognized significantly worsened pain starting yesterday and then more so today.  With any movement her pain is rated 9.5/10.  As she rests and does not move her pain is moderate in severity.  The pain is present in the right lower quadrant and midline pelvis.  The area of pain that has been expanding throughout the day.  No nausea.  Decreased appetite present.  She had one episode of diarrhea yesterday but none since.  No hematochezia or melena.  No constipation.  No dysuria, hematuria, polyuria, urgency.  No vaginal discharge or bleeding.  No trauma or injury.  No skin rash.  No chest pain or shortness of breath.    ROS: All other review of systems are negative other than that noted above.     Past Medical History:   Diagnosis Date     Cataract      DJD (degenerative joint disease) 8/14/2012     Hypertension      Osteopenia      Pure hypercholesterolemia      Retinal detachment     right eye     Unspecified hypothyroidism      Unspecified visual loss     L eye     Past Surgical History:   Procedure Laterality Date     CATARACT IOL, RT/LT       COLPORRHAPHY ANTERIOR N/A 3/30/2017    Procedure: COLPORRHAPHY ANTERIOR;  Surgeon: Rene Mitchell MD;  Location:  OR     CYSTOSCOPY, SLING TRANSVAGINAL N/A 3/30/2017    Procedure: CYSTOSCOPY, SLING TRANSVAGINAL;  Surgeon: Rene Mitchell MD;  Location:  OR     D & C       PHACOEMULSIFICATION WITH  "STANDARD INTRAOCULAR LENS IMPLANT  5/2015    right eye     RETINAL REATTACHMENT  remotely    right eye     TONSILLECTOMY       TUBAL LIGATION       Family History   Problem Relation Age of Onset     Alzheimer Disease Mother      OSTEOPOROSIS Mother      Hypertension Mother      Prostate Cancer Father      OSTEOPOROSIS Maternal Grandmother      CANCER Brother      Eye Disorder Brother      Eye Disorder Sister      Eye Disorder Son      Eye Disorder Brother      Eye Disorder Brother      CEREBROVASCULAR DISEASE Paternal Grandmother      DIABETES No family hx of      Thyroid Disease No family hx of      Glaucoma No family hx of      Macular Degeneration No family hx of      Social History   Substance Use Topics     Smoking status: Former Smoker     Types: Cigarettes     Quit date: 2/1/2011     Smokeless tobacco: Never Used     Alcohol use Yes      Comment: occ         PHYSICAL  /75  Pulse 102  Temp 97.5  F (36.4  C) (Oral)  Resp 18  Ht 1.626 m (5' 4\")  Wt 68 kg (150 lb)  SpO2 94%  BMI 25.75 kg/m2  General: Patient is alert and in moderate to severe distress.  Any movement elicits pain.  Neurological: Alert.  Moving upper and lower extremities equally, bilaterally.  Head / Neck: Atraumatic.  Ears: Not done.  Eyes: Pupils are equal, round, and reactive.  Normal conjunctiva.  Nose: Midline.  No epistaxis.  Mouth / Throat: No ulcerations or lesions.  Upper pharynx is not erythematous.  Moist.  Respiratory: No respiratory distress. CTA B.  Cardiovascular: Regular rhythm.  Peripheral extremities are warm.  No edema.  No calf tenderness.  Abdomen / Pelvis: Patient has tenderness throughout the abdomen but especially in the right lower quadrant.  Soft until I start to palpate into the right lower quadrant.  Genitalia: Not done.  Musculoskeletal: No tenderness over major muscles and joints.  Skin: No evidence of rash or trauma.        ED COURSE  1724.  Patient has severe right lower quadrant tenderness and pain.  " Minimal associated symptoms.  CT scan ordered.  Lab values and urine analysis ordered.  She refuses analgesia.    Labs Ordered and Resulted from Time of ED Arrival Up to the Time of Departure from the ED   URINE MACROSCOPIC WITH REFLEX TO MICRO - Abnormal; Notable for the following:        Result Value    Protein Albumin Urine 30 (*)     Leukocyte Esterase Urine Trace (*)     RBC Urine 3 (*)     WBC Urine 14 (*)     Squamous Epithelial /HPF Urine 3 (*)     Mucous Urine Present (*)     Hyaline Casts 31 (*)     All other components within normal limits   CBC WITH PLATELETS DIFFERENTIAL - Abnormal; Notable for the following:     WBC 25.3 (*)     Absolute Neutrophil 22.1 (*)     Absolute Monocytes 1.7 (*)     All other components within normal limits   BASIC METABOLIC PANEL - Abnormal; Notable for the following:     Sodium 127 (*)     Glucose 111 (*)     All other components within normal limits   LACTIC ACID WHOLE BLOOD - Abnormal; Notable for the following:     Lactic Acid 0.6 (*)     All other components within normal limits   BLOOD CULTURE   BLOOD CULTURE     IMAGING  Images reviewed by me.  Radiology report also reviewed.  Abd/pelvis CT, IV contrast only TRAUMA  / AAA   Preliminary Result   IMPRESSION:   1. Uncomplicated appendicitis.   2. Bilateral inguinal hernias containing bowel, right larger than   left. On the right side infiltration of the herniated fat around the   bowel could indicate early ischemia/inflammation and clinical   correlation is recommended.   3. Bibasilar platelike atelectasis and/or linear scarring.   4. Vascular calcifications, including coronary artery calcifications.        1934.  Patient has appendicitis.  Inguinal hernias also recognized and described to the patient.  Awaiting callback from general surgery.    2049.  I reviewed the CT results with the patient and her family.  I reviewed the CT results with the surgeon on call.  Recommendation is that the patient be admitted to the  hospital with every 6 hours antibiotics.  Medication for pain also ordered.  Toradol will be given here.  No food by mouth overnight.  The patient will be seen by surgery in the morning.  This will go to the surgeon's patient list.      IMPRESSION    ICD-10-CM    1. Acute appendicitis with generalized peritonitis K35.2    2. Bilateral inguinal hernia without obstruction or gangrene, recurrence not specified K40.20            Critical Care time:  none                    Darian Bhakta MD  02/07/18 [id number]

## 2018-02-08 ENCOUNTER — ANESTHESIA (OUTPATIENT)
Dept: SURGERY | Facility: CLINIC | Age: 78
DRG: 339 | End: 2018-02-08
Payer: COMMERCIAL

## 2018-02-08 ENCOUNTER — ANESTHESIA EVENT (OUTPATIENT)
Dept: SURGERY | Facility: CLINIC | Age: 78
DRG: 339 | End: 2018-02-08
Payer: COMMERCIAL

## 2018-02-08 PROBLEM — Z90.49 S/P LAPAROSCOPIC APPENDECTOMY: Status: ACTIVE | Noted: 2018-02-08

## 2018-02-08 PROCEDURE — 96376 TX/PRO/DX INJ SAME DRUG ADON: CPT

## 2018-02-08 PROCEDURE — 25800025 ZZH RX 258: Performed by: SURGERY

## 2018-02-08 PROCEDURE — 27210794 ZZH OR GENERAL SUPPLY STERILE: Performed by: SURGERY

## 2018-02-08 PROCEDURE — 37000009 ZZH ANESTHESIA TECHNICAL FEE, EACH ADDTL 15 MIN: Performed by: SURGERY

## 2018-02-08 PROCEDURE — 36000056 ZZH SURGERY LEVEL 3 1ST 30 MIN: Performed by: SURGERY

## 2018-02-08 PROCEDURE — 25000566 ZZH SEVOFLURANE, EA 15 MIN: Performed by: SURGERY

## 2018-02-08 PROCEDURE — 96375 TX/PRO/DX INJ NEW DRUG ADDON: CPT

## 2018-02-08 PROCEDURE — 27110028 ZZH OR GENERAL SUPPLY NON-STERILE: Performed by: SURGERY

## 2018-02-08 PROCEDURE — 71000012 ZZH RECOVERY PHASE 1 LEVEL 1 FIRST HR: Performed by: SURGERY

## 2018-02-08 PROCEDURE — G0378 HOSPITAL OBSERVATION PER HR: HCPCS

## 2018-02-08 PROCEDURE — 25000125 ZZHC RX 250: Performed by: SURGERY

## 2018-02-08 PROCEDURE — 44970 LAPAROSCOPY APPENDECTOMY: CPT | Performed by: SURGERY

## 2018-02-08 PROCEDURE — 0DTJ4ZZ RESECTION OF APPENDIX, PERCUTANEOUS ENDOSCOPIC APPROACH: ICD-10-PCS | Performed by: SURGERY

## 2018-02-08 PROCEDURE — 37000008 ZZH ANESTHESIA TECHNICAL FEE, 1ST 30 MIN: Performed by: SURGERY

## 2018-02-08 PROCEDURE — 40000305 ZZH STATISTIC PRE PROC ASSESS I: Performed by: SURGERY

## 2018-02-08 PROCEDURE — 25000128 H RX IP 250 OP 636: Performed by: NURSE ANESTHETIST, CERTIFIED REGISTERED

## 2018-02-08 PROCEDURE — 25000128 H RX IP 250 OP 636: Performed by: FAMILY MEDICINE

## 2018-02-08 PROCEDURE — 44970 LAPAROSCOPY APPENDECTOMY: CPT | Mod: AS | Performed by: PHYSICIAN ASSISTANT

## 2018-02-08 PROCEDURE — 25000125 ZZHC RX 250: Performed by: NURSE ANESTHETIST, CERTIFIED REGISTERED

## 2018-02-08 PROCEDURE — 12000027 ZZH R&B OB

## 2018-02-08 PROCEDURE — 36000058 ZZH SURGERY LEVEL 3 EA 15 ADDTL MIN: Performed by: SURGERY

## 2018-02-08 PROCEDURE — 25000128 H RX IP 250 OP 636: Performed by: SURGERY

## 2018-02-08 PROCEDURE — 25000132 ZZH RX MED GY IP 250 OP 250 PS 637: Performed by: SURGERY

## 2018-02-08 RX ORDER — ONDANSETRON 2 MG/ML
4 INJECTION INTRAMUSCULAR; INTRAVENOUS EVERY 6 HOURS PRN
Status: DISCONTINUED | OUTPATIENT
Start: 2018-02-08 | End: 2018-02-09 | Stop reason: HOSPADM

## 2018-02-08 RX ORDER — NALOXONE HYDROCHLORIDE 0.4 MG/ML
.1-.4 INJECTION, SOLUTION INTRAMUSCULAR; INTRAVENOUS; SUBCUTANEOUS
Status: DISCONTINUED | OUTPATIENT
Start: 2018-02-08 | End: 2018-02-09 | Stop reason: HOSPADM

## 2018-02-08 RX ORDER — NEOSTIGMINE METHYLSULFATE 1 MG/ML
VIAL (ML) INJECTION PRN
Status: DISCONTINUED | OUTPATIENT
Start: 2018-02-08 | End: 2018-02-08

## 2018-02-08 RX ORDER — HYDROMORPHONE HYDROCHLORIDE 1 MG/ML
.3-.5 INJECTION, SOLUTION INTRAMUSCULAR; INTRAVENOUS; SUBCUTANEOUS
Status: DISCONTINUED | OUTPATIENT
Start: 2018-02-08 | End: 2018-02-09 | Stop reason: HOSPADM

## 2018-02-08 RX ORDER — DEXAMETHASONE SODIUM PHOSPHATE 4 MG/ML
4 INJECTION, SOLUTION INTRA-ARTICULAR; INTRALESIONAL; INTRAMUSCULAR; INTRAVENOUS; SOFT TISSUE
Status: DISCONTINUED | OUTPATIENT
Start: 2018-02-08 | End: 2018-02-08 | Stop reason: HOSPADM

## 2018-02-08 RX ORDER — ACETAMINOPHEN 325 MG/1
650 TABLET ORAL EVERY 4 HOURS PRN
Status: DISCONTINUED | OUTPATIENT
Start: 2018-02-11 | End: 2018-02-09 | Stop reason: HOSPADM

## 2018-02-08 RX ORDER — LIDOCAINE HYDROCHLORIDE 10 MG/ML
INJECTION, SOLUTION INFILTRATION; PERINEURAL PRN
Status: DISCONTINUED | OUTPATIENT
Start: 2018-02-08 | End: 2018-02-08

## 2018-02-08 RX ORDER — DIPHENHYDRAMINE HCL 12.5MG/5ML
12.5 LIQUID (ML) ORAL EVERY 6 HOURS PRN
Status: DISCONTINUED | OUTPATIENT
Start: 2018-02-08 | End: 2018-02-09 | Stop reason: HOSPADM

## 2018-02-08 RX ORDER — ALBUTEROL SULFATE 0.83 MG/ML
2.5 SOLUTION RESPIRATORY (INHALATION) EVERY 4 HOURS PRN
Status: DISCONTINUED | OUTPATIENT
Start: 2018-02-08 | End: 2018-02-08 | Stop reason: HOSPADM

## 2018-02-08 RX ORDER — DOCUSATE SODIUM 100 MG/1
100 CAPSULE, LIQUID FILLED ORAL 2 TIMES DAILY
Status: DISCONTINUED | OUTPATIENT
Start: 2018-02-08 | End: 2018-02-09 | Stop reason: HOSPADM

## 2018-02-08 RX ORDER — BUPIVACAINE HYDROCHLORIDE AND EPINEPHRINE 2.5; 5 MG/ML; UG/ML
INJECTION, SOLUTION INFILTRATION; PERINEURAL PRN
Status: DISCONTINUED | OUTPATIENT
Start: 2018-02-08 | End: 2018-02-08 | Stop reason: HOSPADM

## 2018-02-08 RX ORDER — ONDANSETRON 2 MG/ML
INJECTION INTRAMUSCULAR; INTRAVENOUS PRN
Status: DISCONTINUED | OUTPATIENT
Start: 2018-02-08 | End: 2018-02-08

## 2018-02-08 RX ORDER — KETOROLAC TROMETHAMINE 30 MG/ML
INJECTION, SOLUTION INTRAMUSCULAR; INTRAVENOUS PRN
Status: DISCONTINUED | OUTPATIENT
Start: 2018-02-08 | End: 2018-02-08

## 2018-02-08 RX ORDER — FENTANYL CITRATE 50 UG/ML
INJECTION, SOLUTION INTRAMUSCULAR; INTRAVENOUS PRN
Status: DISCONTINUED | OUTPATIENT
Start: 2018-02-08 | End: 2018-02-08

## 2018-02-08 RX ORDER — SODIUM CHLORIDE, SODIUM LACTATE, POTASSIUM CHLORIDE, CALCIUM CHLORIDE 600; 310; 30; 20 MG/100ML; MG/100ML; MG/100ML; MG/100ML
INJECTION, SOLUTION INTRAVENOUS CONTINUOUS
Status: DISCONTINUED | OUTPATIENT
Start: 2018-02-08 | End: 2018-02-09 | Stop reason: HOSPADM

## 2018-02-08 RX ORDER — ASPIRIN 81 MG/1
81 TABLET, CHEWABLE ORAL DAILY
Status: DISCONTINUED | OUTPATIENT
Start: 2018-02-08 | End: 2018-02-09 | Stop reason: HOSPADM

## 2018-02-08 RX ORDER — LIDOCAINE 40 MG/G
CREAM TOPICAL
Status: DISCONTINUED | OUTPATIENT
Start: 2018-02-08 | End: 2018-02-09 | Stop reason: HOSPADM

## 2018-02-08 RX ORDER — AMPICILLIN AND SULBACTAM 2; 1 G/1; G/1
3 INJECTION, POWDER, FOR SOLUTION INTRAMUSCULAR; INTRAVENOUS EVERY 6 HOURS
Status: DISCONTINUED | OUTPATIENT
Start: 2018-02-08 | End: 2018-02-09 | Stop reason: CLARIF

## 2018-02-08 RX ORDER — SODIUM CHLORIDE, SODIUM LACTATE, POTASSIUM CHLORIDE, CALCIUM CHLORIDE 600; 310; 30; 20 MG/100ML; MG/100ML; MG/100ML; MG/100ML
INJECTION, SOLUTION INTRAVENOUS CONTINUOUS
Status: DISCONTINUED | OUTPATIENT
Start: 2018-02-08 | End: 2018-02-08 | Stop reason: HOSPADM

## 2018-02-08 RX ORDER — DIPHENHYDRAMINE HYDROCHLORIDE 50 MG/ML
12.5 INJECTION INTRAMUSCULAR; INTRAVENOUS EVERY 6 HOURS PRN
Status: DISCONTINUED | OUTPATIENT
Start: 2018-02-08 | End: 2018-02-09 | Stop reason: HOSPADM

## 2018-02-08 RX ORDER — ONDANSETRON 2 MG/ML
4 INJECTION INTRAMUSCULAR; INTRAVENOUS EVERY 30 MIN PRN
Status: DISCONTINUED | OUTPATIENT
Start: 2018-02-08 | End: 2018-02-08 | Stop reason: HOSPADM

## 2018-02-08 RX ORDER — HYDROMORPHONE HYDROCHLORIDE 1 MG/ML
.3-.5 INJECTION, SOLUTION INTRAMUSCULAR; INTRAVENOUS; SUBCUTANEOUS EVERY 5 MIN PRN
Status: DISCONTINUED | OUTPATIENT
Start: 2018-02-08 | End: 2018-02-08 | Stop reason: HOSPADM

## 2018-02-08 RX ORDER — SODIUM CHLORIDE, SODIUM LACTATE, POTASSIUM CHLORIDE, CALCIUM CHLORIDE 600; 310; 30; 20 MG/100ML; MG/100ML; MG/100ML; MG/100ML
INJECTION, SOLUTION INTRAVENOUS CONTINUOUS PRN
Status: DISCONTINUED | OUTPATIENT
Start: 2018-02-08 | End: 2018-02-08

## 2018-02-08 RX ORDER — DEXAMETHASONE SODIUM PHOSPHATE 4 MG/ML
INJECTION, SOLUTION INTRA-ARTICULAR; INTRALESIONAL; INTRAMUSCULAR; INTRAVENOUS; SOFT TISSUE PRN
Status: DISCONTINUED | OUTPATIENT
Start: 2018-02-08 | End: 2018-02-08

## 2018-02-08 RX ORDER — PROPOFOL 10 MG/ML
INJECTION, EMULSION INTRAVENOUS PRN
Status: DISCONTINUED | OUTPATIENT
Start: 2018-02-08 | End: 2018-02-08

## 2018-02-08 RX ORDER — NALOXONE HYDROCHLORIDE 0.4 MG/ML
.1-.4 INJECTION, SOLUTION INTRAMUSCULAR; INTRAVENOUS; SUBCUTANEOUS
Status: DISCONTINUED | OUTPATIENT
Start: 2018-02-08 | End: 2018-02-08

## 2018-02-08 RX ORDER — ONDANSETRON 4 MG/1
4 TABLET, ORALLY DISINTEGRATING ORAL EVERY 6 HOURS PRN
Status: DISCONTINUED | OUTPATIENT
Start: 2018-02-08 | End: 2018-02-09 | Stop reason: HOSPADM

## 2018-02-08 RX ORDER — HYDROXYZINE HYDROCHLORIDE 25 MG/1
25 TABLET, FILM COATED ORAL EVERY 6 HOURS PRN
Status: CANCELLED | OUTPATIENT
Start: 2018-02-08

## 2018-02-08 RX ORDER — LEVOTHYROXINE SODIUM 75 UG/1
75 TABLET ORAL
Status: DISCONTINUED | OUTPATIENT
Start: 2018-02-08 | End: 2018-02-09 | Stop reason: HOSPADM

## 2018-02-08 RX ORDER — ONDANSETRON 4 MG/1
4 TABLET, ORALLY DISINTEGRATING ORAL EVERY 30 MIN PRN
Status: DISCONTINUED | OUTPATIENT
Start: 2018-02-08 | End: 2018-02-08 | Stop reason: HOSPADM

## 2018-02-08 RX ORDER — METOPROLOL TARTRATE 1 MG/ML
1-2 INJECTION, SOLUTION INTRAVENOUS EVERY 5 MIN PRN
Status: DISCONTINUED | OUTPATIENT
Start: 2018-02-08 | End: 2018-02-08 | Stop reason: HOSPADM

## 2018-02-08 RX ORDER — OXYCODONE HYDROCHLORIDE 5 MG/1
5-10 TABLET ORAL EVERY 4 HOURS PRN
Status: DISCONTINUED | OUTPATIENT
Start: 2018-02-08 | End: 2018-02-09 | Stop reason: HOSPADM

## 2018-02-08 RX ORDER — FENTANYL CITRATE 50 UG/ML
25-50 INJECTION, SOLUTION INTRAMUSCULAR; INTRAVENOUS
Status: DISCONTINUED | OUTPATIENT
Start: 2018-02-08 | End: 2018-02-08 | Stop reason: HOSPADM

## 2018-02-08 RX ORDER — GLYCOPYRROLATE 0.2 MG/ML
INJECTION, SOLUTION INTRAMUSCULAR; INTRAVENOUS PRN
Status: DISCONTINUED | OUTPATIENT
Start: 2018-02-08 | End: 2018-02-08

## 2018-02-08 RX ORDER — SODIUM CHLORIDE 9 MG/ML
INJECTION, SOLUTION INTRAVENOUS CONTINUOUS PRN
Status: DISCONTINUED | OUTPATIENT
Start: 2018-02-08 | End: 2018-02-08

## 2018-02-08 RX ORDER — ACETAMINOPHEN 325 MG/1
975 TABLET ORAL EVERY 8 HOURS
Status: DISCONTINUED | OUTPATIENT
Start: 2018-02-08 | End: 2018-02-09 | Stop reason: HOSPADM

## 2018-02-08 RX ORDER — LISINOPRIL/HYDROCHLOROTHIAZIDE 10-12.5 MG
1 TABLET ORAL DAILY
Status: DISCONTINUED | OUTPATIENT
Start: 2018-02-08 | End: 2018-02-09 | Stop reason: HOSPADM

## 2018-02-08 RX ORDER — PROCHLORPERAZINE MALEATE 5 MG
5 TABLET ORAL EVERY 6 HOURS PRN
Status: DISCONTINUED | OUTPATIENT
Start: 2018-02-08 | End: 2018-02-09 | Stop reason: HOSPADM

## 2018-02-08 RX ORDER — SIMVASTATIN 20 MG
20 TABLET ORAL EVERY EVENING
Status: DISCONTINUED | OUTPATIENT
Start: 2018-02-08 | End: 2018-02-09 | Stop reason: HOSPADM

## 2018-02-08 RX ADMIN — Medication 0.2 MG: at 04:23

## 2018-02-08 RX ADMIN — AMPICILLIN SODIUM AND SULBACTAM SODIUM 3 G: 2; 1 INJECTION, POWDER, FOR SOLUTION INTRAMUSCULAR; INTRAVENOUS at 14:22

## 2018-02-08 RX ADMIN — ACETAMINOPHEN 975 MG: 325 TABLET, FILM COATED ORAL at 20:09

## 2018-02-08 RX ADMIN — PHENYLEPHRINE HYDROCHLORIDE 200 MCG: 10 INJECTION, SOLUTION INTRAMUSCULAR; INTRAVENOUS; SUBCUTANEOUS at 10:27

## 2018-02-08 RX ADMIN — Medication 0.2 MG: at 09:02

## 2018-02-08 RX ADMIN — KETOROLAC TROMETHAMINE 15 MG: 30 INJECTION, SOLUTION INTRAMUSCULAR at 11:01

## 2018-02-08 RX ADMIN — FENTANYL CITRATE 50 MCG: 50 INJECTION, SOLUTION INTRAMUSCULAR; INTRAVENOUS at 10:39

## 2018-02-08 RX ADMIN — SIMVASTATIN 20 MG: 20 TABLET, FILM COATED ORAL at 17:42

## 2018-02-08 RX ADMIN — Medication 2 MG: at 11:03

## 2018-02-08 RX ADMIN — LISINOPRIL AND HYDROCHLOROTHIAZIDE 1 TABLET: 12.5; 1 TABLET ORAL at 12:37

## 2018-02-08 RX ADMIN — SODIUM CHLORIDE: 0.9 INJECTION, SOLUTION INTRAVENOUS at 09:47

## 2018-02-08 RX ADMIN — SODIUM CHLORIDE, POTASSIUM CHLORIDE, SODIUM LACTATE AND CALCIUM CHLORIDE: 600; 310; 30; 20 INJECTION, SOLUTION INTRAVENOUS at 12:40

## 2018-02-08 RX ADMIN — ACETAMINOPHEN 975 MG: 325 TABLET, FILM COATED ORAL at 12:38

## 2018-02-08 RX ADMIN — AMPICILLIN SODIUM AND SULBACTAM SODIUM 3 G: 2; 1 INJECTION, POWDER, FOR SOLUTION INTRAMUSCULAR; INTRAVENOUS at 20:09

## 2018-02-08 RX ADMIN — DOCUSATE SODIUM 100 MG: 100 CAPSULE, LIQUID FILLED ORAL at 12:38

## 2018-02-08 RX ADMIN — ONDANSETRON 4 MG: 2 INJECTION INTRAMUSCULAR; INTRAVENOUS at 10:54

## 2018-02-08 RX ADMIN — GLYCOPYRROLATE 0.4 MG: 0.2 INJECTION, SOLUTION INTRAMUSCULAR; INTRAVENOUS at 11:03

## 2018-02-08 RX ADMIN — Medication 0.2 MG: at 02:27

## 2018-02-08 RX ADMIN — AMPICILLIN SODIUM AND SULBACTAM SODIUM 1.5 G: 1; .5 INJECTION, POWDER, FOR SOLUTION INTRAMUSCULAR; INTRAVENOUS at 02:15

## 2018-02-08 RX ADMIN — DOCUSATE SODIUM 100 MG: 100 CAPSULE, LIQUID FILLED ORAL at 20:09

## 2018-02-08 RX ADMIN — DEXAMETHASONE SODIUM PHOSPHATE 4 MG: 4 INJECTION, SOLUTION INTRA-ARTICULAR; INTRALESIONAL; INTRAMUSCULAR; INTRAVENOUS; SOFT TISSUE at 10:30

## 2018-02-08 RX ADMIN — ASPIRIN 81 MG 81 MG: 81 TABLET ORAL at 12:37

## 2018-02-08 RX ADMIN — VITAMIN D, TAB 1000IU (100/BT) 1000 UNITS: 25 TAB at 12:37

## 2018-02-08 RX ADMIN — PROPOFOL 80 MG: 10 INJECTION, EMULSION INTRAVENOUS at 10:20

## 2018-02-08 RX ADMIN — LIDOCAINE HYDROCHLORIDE 50 MG: 10 INJECTION, SOLUTION INFILTRATION; PERINEURAL at 10:20

## 2018-02-08 RX ADMIN — Medication 0.2 MG: at 06:58

## 2018-02-08 RX ADMIN — LEVOTHYROXINE SODIUM 75 MCG: 75 TABLET ORAL at 12:37

## 2018-02-08 RX ADMIN — SODIUM CHLORIDE, POTASSIUM CHLORIDE, SODIUM LACTATE AND CALCIUM CHLORIDE: 600; 310; 30; 20 INJECTION, SOLUTION INTRAVENOUS at 10:49

## 2018-02-08 RX ADMIN — AMPICILLIN SODIUM AND SULBACTAM SODIUM 1.5 G: 1; .5 INJECTION, POWDER, FOR SOLUTION INTRAMUSCULAR; INTRAVENOUS at 07:54

## 2018-02-08 RX ADMIN — ROCURONIUM BROMIDE 30 MG: 10 INJECTION INTRAVENOUS at 10:20

## 2018-02-08 RX ADMIN — FENTANYL CITRATE 50 MCG: 50 INJECTION, SOLUTION INTRAMUSCULAR; INTRAVENOUS at 10:43

## 2018-02-08 RX ADMIN — FENTANYL CITRATE 50 MCG: 50 INJECTION, SOLUTION INTRAMUSCULAR; INTRAVENOUS at 10:33

## 2018-02-08 RX ADMIN — Medication 0.2 MG: at 00:16

## 2018-02-08 RX ADMIN — RANITIDINE 150 MG: 150 TABLET ORAL at 12:37

## 2018-02-08 RX ADMIN — FENTANYL CITRATE 100 MCG: 50 INJECTION, SOLUTION INTRAMUSCULAR; INTRAVENOUS at 10:16

## 2018-02-08 ASSESSMENT — LIFESTYLE VARIABLES: TOBACCO_USE: 1

## 2018-02-08 NOTE — CONSULTS
Pt admitted for acute appendicitis.  She received IV antibiotics overnight.  Her pain is in the RLQ.    She also has bilateral inguinal hernias, but is not symptomatic from these.    H and P by Dr. Bhakta in the ER.    A/P:  Acute appendicitis.  I discussed laparoscopic appendectomy with her.  Risks and benefits were explained, including but not limited to bleeding, infection, and anesthesia. She seems to understand and consented to proceed with the procedure. Surgery will be scheduled as soon as possible.    Damian Pacheco MD, FACS

## 2018-02-08 NOTE — PROGRESS NOTES
VSS, lungs, clear, HR regular, pain managed with PRN Dilaudid IV, awaiting call from OR for surgery which is thought to be scheduled for around 10am today.  Patient denies nausea, is voiding WDL, has been NPO since just before midnight last night.

## 2018-02-08 NOTE — ED NOTES
Updated patient on plan for surgery tomorrow. And sent the patient's family to the [facility] for some soup for the patient, the patient is aware that she will be NPO after midnight.

## 2018-02-08 NOTE — PROGRESS NOTES
[facility] TRANSPORT NOTE  Data:   Reason for Transport:  From ED to floor    [facility]  Patient was accompanied by Transport Aide. Equipment used for transport: IV pump. Family was aware of reason for transport: yes    Action:  Report: received from Olesya PATEL    Response:  Patient's condition upon transfer was alert and oriented. Comfortable .    Honey Solano

## 2018-02-08 NOTE — ANESTHESIA CARE TRANSFER NOTE
Patient: Claire Moore    Procedure(s):  Laparoscopic appendectomy - Wound Class: III-Contaminated    Diagnosis: acute appendicitis  Diagnosis Additional Information: No value filed.    Anesthesia Type:   MAC     Note:  Airway :Nasal Cannula  Patient transferred to:PACU  Handoff Report: Identifed the Patient, Identified the Reponsible Provider, Reviewed the pertinent medical history, Discussed the surgical course, Reviewed Intra-OP anesthesia mangement and issues during anesthesia, Set expectations for post-procedure period and Allowed opportunity for questions and acknowledgement of understanding      Vitals: (Last set prior to Anesthesia Care Transfer)    CRNA VITALS  2/8/2018 1045 - 2/8/2018 1120      2/8/2018             Resp Rate (observed): (!)  1                Electronically Signed By: GINA Chung CRNA  February 8, 2018  11:20 AM

## 2018-02-08 NOTE — PLAN OF CARE
Problem: Infection, Risk/Actual ([place],NICU)  Goal: Infection Prevention/Resolution  Patient will demonstrate the desired outcomes by discharge/transition of care.   Outcome: Improving  Pt slept well overnight. Some complaints of pain, IV dilaudid 0.2 mg and hot packs given with good relief. Up to bathroom with SBA due to IV pole.Voiding with out issues. Pt is alert and oriented, using call light appropriately. IV abx as scheduled. Plan is for surgery consult this am and appendectomy. Pt has been NPO since midnight.     Honey Solano RN 2018 5:28 AM

## 2018-02-08 NOTE — PLAN OF CARE
Problem: Patient Care Overview  Goal: Plan of Care/Patient Progress Review  Outcome: Improving  Patient returned to the unit s/p appendectomy around 1200.  VSS, patient requiring 2L O2 per NC to maintain O2 sats 93-97%, capnography in use, lungs clear, HR regular.  Lap appy sites x3 all CDI.  Pain very minimal and only with movement.  Up to bathroom with SBA voiding WDL, tolerating PO (coffee, water, toast) denies nausea.  Overall doing very well.  Admitted to inpatient status per MD orders.  Will continue to monitor.

## 2018-02-08 NOTE — ANESTHESIA PREPROCEDURE EVALUATION
Anesthesia Evaluation     . Pt has had prior anesthetic.            ROS/MED HX    ENT/Pulmonary:     (+)tobacco use, Past use , . .    Neurologic:  - neg neurologic ROS     Cardiovascular:     (+) Dyslipidemia, hypertension----. : . . . :. . Previous cardiac testing date:results:date: results:ECG reviewed date:3/20/17 results:No previous record available for comparison  Sinus Rhythm   -Left atrial enlargement.     BORDERLINE   date: results:          METS/Exercise Tolerance:  >4 METS   Hematologic:         Musculoskeletal:   (+) arthritis, , , other musculoskeletal- DJD      GI/Hepatic:     (+) appendicitis,       Renal/Genitourinary:  - ROS Renal section negative       Endo:     (+) thyroid problem hypothyroidism, .      Psychiatric:  - neg psychiatric ROS       Infectious Disease:  - neg infectious disease ROS       Malignancy:      - no malignancy   Other: Comment: Visual loss--hx of retinal detachment                    Physical Exam  Normal systems: pulmonary and dental    Airway   Mallampati: II  TM distance: >3 FB  Neck ROM: full    Dental     Cardiovascular   Rhythm and rate: regular and normal      Pulmonary                         Anesthesia Plan      History & Physical Review  History and physical reviewed and following examination; no interval change.    ASA Status:  2 .    NPO Status:  > 8 hours    Plan for General and ETT with Propofol and Intravenous induction.   PONV prophylaxis:  Ondansetron (or other 5HT-3) and Dexamethasone or Solumedrol  Additional equipment: Videolaryngoscope      Postoperative Care  Postoperative pain management:  IV analgesics and Oral pain medications.      Consents  Anesthetic plan, risks, benefits and alternatives discussed with:  Patient..                          .

## 2018-02-08 NOTE — OP NOTE
Procedure Date: 02/08/2018      PREOPERATIVE DIAGNOSIS:  Acute appendicitis.      POSTOPERATIVE DIAGNOSES:  Acute appendicitis, bilateral inguinal hernias.      PROCEDURE:  Laparoscopic appendectomy.      ANESTHESIA:  General.      SURGEON:  Glenn Pacheco MD      ASSISTANT:  ADDI Sanchez.  His assistance was needed for camera operation.      INDICATIONS FOR SURGERY:  This is a 77-year-old woman with diagnosis of acute appendicitis.  I discussed laparoscopic appendectomy with her.  Risks and benefits were explained.  She seemed to understand and was agreeable to proceed.      DESCRIPTION OF PROCEDURE:  Under general anesthesia the patient's abdomen was prepped and draped in the usual manner.  Marcaine 0.5% with epinephrine was used to infiltrate superior to the umbilicus and an incision was made.  The Veress needle was placed intra-abdominally.  The abdomen was insufflated to a constant pressure and 0 flow.  A 5 mm Visiport was placed under direct vision through this incision using a 0-degree scope.  After placing this in the abdomen the scope was changed to a 30-degree angled scope.  Two other ports were placed in the abdomen after infiltration of Marcaine; a 5 mm in the suprapubic area and a 10-12 mm in the left lower quadrant.  The patient was positioned head down and right side up.  She had bilateral inguinal hernias with loops of bowel incarcerated in both.  The left side was reduced with external pressure and the colon fell out of this area of hernia easily.  On the right side it was noted she had incarceration with an area of inflammation in the hernia.  This consisted of fibrinous exudate from the body walling off this appendicitis.  This right side was reduced with external manual and by reducing the small bowel colon out of this hernia sac.  After doing this the small bowel was ran and adhesions of fibrinous exudate were broken down to expose appendix.  The appendix was inflamed and the base was  quite necrotic to the cecum.  Upon mobilizing this, the base of the appendix and mesoappendix was divided with Endo-GILA stapler and 2 reloads.  The staple lines were inspected and looked to be viable, although she has a lot of fibrinous exudate in this area.  The appendix was then removed from the abdomen through an EndoCatch bag through the left lower port site.  The abdomen was then irrigated with saline and fluid was evacuated.  Staple lines were inspected and they looked to be viable and intact.  All the port sites were then removed under direct vision.  The left lower port site was closed using an Endo Fascial device and #1-Vicryl stitch.  All port sites were then closed with 4-0 subcuticular Monocryl stitch.  The patient tolerated the procedure well.  She went to PACU in stable condition.  Estimated blood loss was minimal.         PRISCILLA EMERY MD             D: 2018   T: 2018   MT: JALYN      Name:     CAN WOODS   MRN:      [id number]        Account:        [id number]   :      1940           Procedure Date: 2018      Document: [id number]

## 2018-02-08 NOTE — PLAN OF CARE
Problem: Pain, Acute (Adult)  Goal: Identify Related Risk Factors and Signs and Symptoms  Related risk factors and signs and symptoms are identified upon initiation of Human Response Clinical Practice Guideline (CPG).   Outcome: Improving  Patient is doing well post op, is alert and oriented. Pain well controlled with tylenol and ice packs to the abdomen. Lap sites x3 are clean, dry and intact, open to air. Bowel sounds are active x4 and LS clear, using IS. Patient tolerating PO fluids and toast. Up to the bathroom and voiding without difficulty. Patient up and ambulated around nursing unit with SBA. Able to make needs known and using call light appropriately.

## 2018-02-08 NOTE — PLAN OF CARE
Problem: Infection, Risk/Actual (,NICU)  Goal: Identify Related Risk Factors and Signs and Symptoms  Related risk factors and signs and symptoms are identified upon initiation of Human Response Clinical Practice Guideline (CPG).  Outcome: Improving  Pt alert and oriented. Independent in room. Vitals stable. Pain comfortable, declines pain meds at this time. IV abx as scheduled over night.     Honey Solano RN 2018 10:47 PM

## 2018-02-08 NOTE — ED NOTES
"Patient has  [place] to Observation  order. Patient has been given the Observation brochure -  What does Observation mean to me.\"  Patient has been given the opportunity to ask questions about observation status and their plan of care.      DIONY SOLER    "

## 2018-02-08 NOTE — ANESTHESIA POSTPROCEDURE EVALUATION
Patient: Claire Moore    Procedure(s):  Laparoscopic appendectomy - Wound Class: III-Contaminated    Diagnosis:acute appendicitis  Diagnosis Additional Information: No value filed.    Anesthesia Type:  MAC    Note:  Anesthesia Post Evaluation    Patient location during evaluation: Floor  Patient participation: Able to fully participate in evaluation  Level of consciousness: awake  Pain management: adequate  Airway patency: patent  Cardiovascular status: stable  Respiratory status: nasal cannula  Hydration status: stable  PONV: none     Anesthetic complications: None          Last vitals:  Vitals:    02/08/18 1200 02/08/18 1230 02/08/18 1309   BP: 135/58 128/55 127/57   Pulse:      Resp: 22 22 18   Temp: 36.8  C (98.3  F)     SpO2: 94% 93%          Electronically Signed By: GINA Chung CRNA  February 8, 2018  1:46 PM

## 2018-02-09 VITALS
RESPIRATION RATE: 18 BRPM | HEART RATE: 85 BPM | HEIGHT: 64 IN | BODY MASS INDEX: 25.61 KG/M2 | OXYGEN SATURATION: 92 % | DIASTOLIC BLOOD PRESSURE: 68 MMHG | SYSTOLIC BLOOD PRESSURE: 143 MMHG | WEIGHT: 150 LBS | TEMPERATURE: 96.7 F

## 2018-02-09 LAB
ANION GAP SERPL CALCULATED.3IONS-SCNC: 6 MMOL/L (ref 3–14)
BASOPHILS # BLD AUTO: 0 10E9/L (ref 0–0.2)
BASOPHILS NFR BLD AUTO: 0.1 %
BUN SERPL-MCNC: 18 MG/DL (ref 7–30)
CALCIUM SERPL-MCNC: 7.8 MG/DL (ref 8.5–10.1)
CHLORIDE SERPL-SCNC: 99 MMOL/L (ref 94–109)
CO2 SERPL-SCNC: 28 MMOL/L (ref 20–32)
COPATH REPORT: NORMAL
CREAT SERPL-MCNC: 0.69 MG/DL (ref 0.52–1.04)
DIFFERENTIAL METHOD BLD: ABNORMAL
EOSINOPHIL # BLD AUTO: 0 10E9/L (ref 0–0.7)
EOSINOPHIL NFR BLD AUTO: 0.1 %
ERYTHROCYTE [DISTWIDTH] IN BLOOD BY AUTOMATED COUNT: 13.7 % (ref 10–15)
GFR SERPL CREATININE-BSD FRML MDRD: 82 ML/MIN/1.7M2
GLUCOSE SERPL-MCNC: 108 MG/DL (ref 70–99)
HCT VFR BLD AUTO: 35.3 % (ref 35–47)
HGB BLD-MCNC: 12 G/DL (ref 11.7–15.7)
IMM GRANULOCYTES # BLD: 0.1 10E9/L (ref 0–0.4)
IMM GRANULOCYTES NFR BLD: 0.3 %
LYMPHOCYTES # BLD AUTO: 0.7 10E9/L (ref 0.8–5.3)
LYMPHOCYTES NFR BLD AUTO: 3.8 %
MCH RBC QN AUTO: 30.1 PG (ref 26.5–33)
MCHC RBC AUTO-ENTMCNC: 34 G/DL (ref 31.5–36.5)
MCV RBC AUTO: 89 FL (ref 78–100)
MONOCYTES # BLD AUTO: 1.1 10E9/L (ref 0–1.3)
MONOCYTES NFR BLD AUTO: 6.1 %
NEUTROPHILS # BLD AUTO: 16.1 10E9/L (ref 1.6–8.3)
NEUTROPHILS NFR BLD AUTO: 89.6 %
PLATELET # BLD AUTO: 222 10E9/L (ref 150–450)
POTASSIUM SERPL-SCNC: 3.7 MMOL/L (ref 3.4–5.3)
RBC # BLD AUTO: 3.99 10E12/L (ref 3.8–5.2)
SODIUM SERPL-SCNC: 133 MMOL/L (ref 133–144)
WBC # BLD AUTO: 17.9 10E9/L (ref 4–11)

## 2018-02-09 PROCEDURE — 80048 BASIC METABOLIC PNL TOTAL CA: CPT | Performed by: SURGERY

## 2018-02-09 PROCEDURE — 25000132 ZZH RX MED GY IP 250 OP 250 PS 637: Performed by: SURGERY

## 2018-02-09 PROCEDURE — 36415 COLL VENOUS BLD VENIPUNCTURE: CPT | Performed by: SURGERY

## 2018-02-09 PROCEDURE — 25000128 H RX IP 250 OP 636: Performed by: SURGERY

## 2018-02-09 PROCEDURE — 85025 COMPLETE CBC W/AUTO DIFF WBC: CPT | Performed by: SURGERY

## 2018-02-09 RX ORDER — AMOXICILLIN AND CLAVULANATE POTASSIUM 500; 125 MG/1; MG/1
1 TABLET, FILM COATED ORAL EVERY 8 HOURS
Qty: 30 TABLET | Refills: 0 | Status: SHIPPED | OUTPATIENT
Start: 2018-02-09 | End: 2018-02-27

## 2018-02-09 RX ORDER — AMOXICILLIN AND CLAVULANATE POTASSIUM 500; 125 MG/1; MG/1
1 TABLET, FILM COATED ORAL EVERY 8 HOURS SCHEDULED
Status: DISCONTINUED | OUTPATIENT
Start: 2018-02-09 | End: 2018-02-09 | Stop reason: HOSPADM

## 2018-02-09 RX ADMIN — LISINOPRIL AND HYDROCHLOROTHIAZIDE 1 TABLET: 12.5; 1 TABLET ORAL at 08:59

## 2018-02-09 RX ADMIN — AMOXICILLIN AND CLAVULANATE POTASSIUM 1 TABLET: 500; 125 TABLET, FILM COATED ORAL at 09:00

## 2018-02-09 RX ADMIN — LEVOTHYROXINE SODIUM 75 MCG: 75 TABLET ORAL at 06:41

## 2018-02-09 RX ADMIN — ACETAMINOPHEN 975 MG: 325 TABLET, FILM COATED ORAL at 04:16

## 2018-02-09 RX ADMIN — VITAMIN D, TAB 1000IU (100/BT) 1000 UNITS: 25 TAB at 09:00

## 2018-02-09 RX ADMIN — AMPICILLIN SODIUM AND SULBACTAM SODIUM 3 G: 2; 1 INJECTION, POWDER, FOR SOLUTION INTRAMUSCULAR; INTRAVENOUS at 02:39

## 2018-02-09 RX ADMIN — RANITIDINE 150 MG: 150 TABLET ORAL at 09:00

## 2018-02-09 RX ADMIN — DOCUSATE SODIUM 100 MG: 100 CAPSULE, LIQUID FILLED ORAL at 09:00

## 2018-02-09 NOTE — PLAN OF CARE
Problem: Patient Care Overview  Goal: Plan of Care/Patient Progress Review  Outcome: Improving  Surgeon Dr Solorzano contacted with an update. IV infiltrated.  MD ordered abx, Augmentin  mg TID. All meds can be PO. Tolerating reg diet. WBC improved, 25.3 to 17.9 today. POC d/c home today.

## 2018-02-09 NOTE — PROGRESS NOTES
Pt stated her pain is fine. Pt declined pain meds.  Pt is up independently to the BR.  Pt is 92% on RA and pulls the IS up to 750.

## 2018-02-09 NOTE — DISCHARGE INSTRUCTIONS
Wash incisions daily with soap and water. Some mild redness or swelling is expected. If draining, cover with dry gauze.    No heavy lifting (less than 30 pounds) for 1 month.    Okay to use ice pack over wound as necessary for comfort.    Ibuprofen or Tylenol for post-op pain.    Diet as tolerated. No restrictions.    Follow up with Dr Pacheco in 1-2 weeks.

## 2018-02-09 NOTE — DISCHARGE SUMMARY
Physician Discharge Summary     Patient ID:  Claire Moore  [id number]  77 year old  1940    Admit date: 2/7/2018    Discharge date and time: 2/9/2018     Admitting Physician: Glenn Pacheco MD     Discharge Physician: Shashank Solorzano MD    Admission Diagnoses: Acute appendicitis with generalized peritonitis [K35.2]  Bilateral inguinal hernia without obstruction or gangrene, recurrence not specified [K40.20]    Discharge Diagnoses: same    Admission Condition: good    Discharged Condition: good    Indication for Admission: post-op care    Hospital Course: Patient had surgery on 2/8/2018.  She felt well the next day and was discharged.    Consults: none    Significant Diagnostic Studies: radiology: CT scan: appendicitis    Treatments: surgery: Laparoscopic appendectomy    Discharge Exam:  Abdomen soft, but mildly distended.  Non-tender.    Disposition: home    Patient Instructions:   Current Discharge Medication List      START taking these medications    Details   amoxicillin-clavulanate (AUGMENTIN) 500-125 MG per tablet Take 1 tablet by mouth every 8 hours  Qty: 30 tablet, Refills: 0    Associated Diagnoses: Acute appendicitis with generalized peritonitis         CONTINUE these medications which have NOT CHANGED    Details   lisinopril-hydrochlorothiazide (PRINZIDE/ZESTORETIC) 20-25 MG per tablet Take 0.5 tablets by mouth daily  Qty: 90 tablet, Refills: 1    Comments: Does not need immediate refill, keep on file until patient contacts pharmacy  Associated Diagnoses: Essential hypertension with goal blood pressure less than 140/90      simvastatin (ZOCOR) 20 MG tablet Take 1 tablet (20 mg) by mouth daily  Qty: 90 tablet, Refills: 3    Comments: Does not need immediate refill, keep on file until patient contacts pharmacy  Associated Diagnoses: Hyperlipidemia LDL goal <130      levothyroxine (SYNTHROID/LEVOTHROID) 75 MCG tablet Take 1 tablet (75 mcg) by mouth daily  Qty: 90 tablet, Refills: 3    Comments:  Does not need immediate refill, keep on file until patient contacts pharmacy  Associated Diagnoses: Other specified hypothyroidism      aspirin 81 MG tablet Take  by mouth daily.      Multiple Vitamin (MULTI-DAY VITAMINS PO) Take  by mouth. 1 tab daily      cholecalciferol (VITAMIN D) 1000 UNIT tablet Take 1 tablet by mouth daily.      CALCIUM 500 OR 1 per day            Activity: activity as tolerated  Diet: regular diet  Wound Care: keep wound clean and dry    Follow-up with Dr. Pacheco in 2 weeks.    Signed:  Shashank Solorzano  2/9/2018  9:34 AM

## 2018-02-09 NOTE — PLAN OF CARE
Problem: Patient Care Overview  Goal: Plan of Care/Patient Progress Review  Outcome: Improving   Anesthesia- General, * No complications entered in OR log *  Review of patient's allergies indicates no known allergies.  Vital Signs  Temp: 98.3  F (36.8  C)  Temp src: Oral  Resp: 18  Pulse: 85  Heart Rate: 82  Pulse/Heart Rate Source: Monitor  BP: 143/68  SpO2: 96 %  Last vitals done @ 0230  Surgical incisions; Laproscopic surgical sites X 2  Lines, drains and devices include; Pulse Oximeter  Vag packing Is not in place  EBL: * No values recorded between 2/8/2018 10:28 AM and 2/8/2018 11:15 AM *  Administration (Incentive Spirometer): postop instruction, proper technique demonstrated     Pain goal achieved with Tylenol.  Last medicated at 0400.  Antiemetic given; None  Lab Results   Component Value Date     (H) 02/07/2018    HGB 13.9 02/07/2018    HGB 12.9 03/20/2017    WBC 25.3 (H) 02/07/2018     Pt expecting to go home today

## 2018-02-09 NOTE — PLAN OF CARE
Problem: Patient Care Overview  Goal: Discharge Needs Assessment  Outcome: Completed Date Met: 02/09/18  Pt left via w/c after her discharge instructions were reviewed with her.

## 2018-02-14 LAB
BACTERIA SPEC CULT: NO GROWTH
BACTERIA SPEC CULT: NO GROWTH
Lab: NORMAL
Lab: NORMAL
SPECIMEN SOURCE: NORMAL
SPECIMEN SOURCE: NORMAL

## 2018-02-27 ENCOUNTER — OFFICE VISIT (OUTPATIENT)
Dept: SURGERY | Facility: CLINIC | Age: 78
End: 2018-02-27
Payer: COMMERCIAL

## 2018-02-27 VITALS
WEIGHT: 150 LBS | HEART RATE: 94 BPM | BODY MASS INDEX: 25.61 KG/M2 | TEMPERATURE: 97.5 F | SYSTOLIC BLOOD PRESSURE: 152 MMHG | DIASTOLIC BLOOD PRESSURE: 87 MMHG | HEIGHT: 64 IN

## 2018-02-27 DIAGNOSIS — Z09 SURGERY FOLLOW-UP EXAMINATION: Primary | ICD-10-CM

## 2018-02-27 PROCEDURE — 99024 POSTOP FOLLOW-UP VISIT: CPT | Performed by: SURGERY

## 2018-02-27 NOTE — PROGRESS NOTES
Pt returns to see me for a post op visit.  She had a lap appy done 2 weeks ago.  She is doing well, has finished her antibiotics and is eating and having BM's and is no longer having incisional pain.    On examination:  Her incisions are all well healed.    Her pathology showed acute appendicitis and serositis.    A/P:  Well post op visit.  She is to return on a PRN basis.    Damian Pacheco MD, FACS

## 2018-02-27 NOTE — NURSING NOTE
"Initial /87 (BP Location: Right arm, Patient Position: Sitting, Cuff Size: Adult Regular)  Pulse 94  Temp 97.5  F (36.4  C) (Oral)  Ht 1.626 m (5' 4\")  Wt 68 kg (150 lb)  BMI 25.75 kg/m2 Estimated body mass index is 25.75 kg/(m^2) as calculated from the following:    Height as of this encounter: 1.626 m (5' 4\").    Weight as of this encounter: 68 kg (150 lb). .    Farzana Persaud MA    "

## 2018-02-27 NOTE — MR AVS SNAPSHOT
"              After Visit Summary   2018    Claire Moore    MRN: [id number]           Patient Information     Date Of Birth          1940        Visit Information        Provider Department      2018 9:30 AM Glenn Pacheco MD [facility]        Today's Diagnoses     Surgery follow-up examination    -  1      Care Instructions    Per Dr. Pacheco's instructions          Follow-ups after your visit        Who to contact     If you have questions or need follow up information about today's clinic visit or your schedule please contact [facility] directly at [phone].  Normal or non-critical lab and imaging results will be communicated to you by Knginehart, letter or phone within 4 business days after the clinic has received the results. If you do not hear from us within 7 days, please contact the clinic through Knginehart or phone. If you have a critical or abnormal lab result, we will notify you by phone as soon as possible.  Submit refill requests through Snap Trends or call your pharmacy and they will forward the refill request to us. Please allow 3 business days for your refill to be completed.          Additional Information About Your Visit        MyChart Information     Snap Trends lets you send messages to your doctor, view your test results, renew your prescriptions, schedule appointments and more. To sign up, go to [email or web address] . Click on \"Log in\" on the left side of the screen, which will take you to the Welcome page. Then click on \"Sign up Now\" on the right side of the page.     You will be asked to enter the access code listed below, as well as some personal information. Please follow the directions to create your username and password.     Your access code is: [email or web address]  Expires: 5/10/2018  9:55 AM     Your access code will  in 90 days. If you need help or a new code, please call your [facility] or [phone].        Care EveryWhere ID     " "This is your Care EveryWhere ID. This could be used by other organizations to access your [facility] medical records  [id number]        Your Vitals Were     Pulse Temperature Height BMI (Body Mass Index)          94 97.5  F (36.4  C) (Oral) 1.626 m (5' 4\") 25.75 kg/m2         Blood Pressure from Last 3 Encounters:   02/27/18 152/87   02/09/18 143/68   08/09/17 158/88    Weight from Last 3 Encounters:   02/27/18 68 kg (150 lb)   02/07/18 68 kg (150 lb)   06/12/17 74.8 kg (165 lb)              Today, you had the following     No orders found for display       Primary Care Provider Office Phone # Fax #    Kristen M Kehr, PA-C [phone] [id number] [facility] [address]        Equal Access to Services     [facility]: Hadii aad ku hadasho Soomaali, waaxda luqadaha, qaybta kaalmada adeegyada, israel ramesh hayjustinn jude francisco . So [facility] [phone].    ATENCIÓN: Si habla español, tiene a khan disposición servicios gratuitos de asistencia lingüística. Abhi al [phone].    We comply with applicable federal civil rights laws and [place] laws. We do not discriminate on the basis of race, color, national origin, age, disability, sex, sexual orientation, or gender identity.            Thank you!     Thank you for choosing [facility]  for your care. Our goal is always to provide you with excellent care. Hearing back from our patients is one way we can continue to improve our services. Please take a few minutes to complete the written survey that you may receive in the mail after your visit with us. Thank you!             Your Updated Medication List - Protect others around you: Learn how to safely use, store and throw away your medicines at www.disposemymeds.org.          This list is accurate as of 2/27/18  9:44 AM.  Always use your most recent med list.                   Brand Name Dispense Instructions for use Diagnosis    aspirin 81 MG tablet      Take  by mouth daily. "        CALCIUM 500 PO      1 per day        cholecalciferol 1000 UNIT tablet    vitamin D3     Take 1 tablet by mouth daily.        levothyroxine 75 MCG tablet    SYNTHROID/LEVOTHROID    90 tablet    Take 1 tablet (75 mcg) by mouth daily    Other specified hypothyroidism       lisinopril-hydrochlorothiazide 20-25 MG per tablet    PRINZIDE/ZESTORETIC    90 tablet    Take 0.5 tablets by mouth daily    Essential hypertension with goal blood pressure less than 140/90       MULTI-DAY VITAMINS PO      Take  by mouth. 1 tab daily        simvastatin 20 MG tablet    ZOCOR    90 tablet    Take 1 tablet (20 mg) by mouth daily    Hyperlipidemia LDL goal <130

## 2018-02-27 NOTE — LETTER
2/27/2018         RE: Claire Moore  [id number] [address] [address]        Dear Colleague,    Thank you for referring your patient, Claire Moore, to the [facility]. Please see a copy of my visit note below.    Pt returns to see me for a post op visit.  She had a lap appy done 2 weeks ago.  She is doing well, has finished her antibiotics and is eating and having BM's and is no longer having incisional pain.    On examination:  Her incisions are all well healed.    Her pathology showed acute appendicitis and serositis.    A/P:  Well post op visit.  She is to return on a PRN basis.    Damian Pacheco MD, FACS      Again, thank you for allowing me to participate in the care of your patient.        Sincerely,        Glenn Pacheco MD

## 2018-06-01 DIAGNOSIS — I10 ESSENTIAL HYPERTENSION WITH GOAL BLOOD PRESSURE LESS THAN 140/90: ICD-10-CM

## 2018-06-01 DIAGNOSIS — E03.8 OTHER SPECIFIED HYPOTHYROIDISM: ICD-10-CM

## 2018-06-01 RX ORDER — LEVOTHYROXINE SODIUM 75 UG/1
TABLET ORAL
Qty: 30 TABLET | Refills: 0 | Status: SHIPPED | OUTPATIENT
Start: 2018-06-01 | End: 2018-06-21

## 2018-06-01 RX ORDER — LISINOPRIL AND HYDROCHLOROTHIAZIDE 20; 25 MG/1; MG/1
TABLET ORAL
Qty: 45 TABLET | Refills: 0 | Status: SHIPPED | OUTPATIENT
Start: 2018-06-01 | End: 2018-06-21 | Stop reason: ALTCHOICE

## 2018-06-06 ENCOUNTER — DOCUMENTATION ONLY (OUTPATIENT)
Dept: LAB | Facility: CLINIC | Age: 78
End: 2018-06-06

## 2018-06-06 DIAGNOSIS — E03.8 OTHER SPECIFIED HYPOTHYROIDISM: ICD-10-CM

## 2018-06-06 DIAGNOSIS — E78.5 HYPERLIPIDEMIA LDL GOAL <130: ICD-10-CM

## 2018-06-06 DIAGNOSIS — I10 HYPERTENSION GOAL BP (BLOOD PRESSURE) < 140/90: Primary | ICD-10-CM

## 2018-06-06 NOTE — PROGRESS NOTES
.Please place or confirm pending lab orders for upcoming lab appointment on 6/18/18  Thank you,  Daniela

## 2018-06-06 NOTE — PROGRESS NOTES
Please review and add sign Pending Pre-visit Labs in [facility].Labs 06/18/18 and Physical 06/21/18   Angelica WRIGHT

## 2018-06-18 DIAGNOSIS — E78.5 HYPERLIPIDEMIA LDL GOAL <130: ICD-10-CM

## 2018-06-18 DIAGNOSIS — E03.8 OTHER SPECIFIED HYPOTHYROIDISM: ICD-10-CM

## 2018-06-18 DIAGNOSIS — I10 HYPERTENSION GOAL BP (BLOOD PRESSURE) < 140/90: ICD-10-CM

## 2018-06-18 LAB
ALBUMIN SERPL-MCNC: 4 G/DL (ref 3.4–5)
ANION GAP SERPL CALCULATED.3IONS-SCNC: 7 MMOL/L (ref 3–14)
BUN SERPL-MCNC: 15 MG/DL (ref 7–30)
CALCIUM SERPL-MCNC: 8.9 MG/DL (ref 8.5–10.1)
CHLORIDE SERPL-SCNC: 95 MMOL/L (ref 94–109)
CHOLEST SERPL-MCNC: 164 MG/DL
CO2 SERPL-SCNC: 27 MMOL/L (ref 20–32)
CREAT SERPL-MCNC: 0.72 MG/DL (ref 0.52–1.04)
GFR SERPL CREATININE-BSD FRML MDRD: 77 ML/MIN/1.7M2
GLUCOSE SERPL-MCNC: 87 MG/DL (ref 70–99)
HDLC SERPL-MCNC: 76 MG/DL
LDLC SERPL CALC-MCNC: 71 MG/DL
NONHDLC SERPL-MCNC: 88 MG/DL
PHOSPHATE SERPL-MCNC: 3.3 MG/DL (ref 2.5–4.5)
POTASSIUM SERPL-SCNC: 3.8 MMOL/L (ref 3.4–5.3)
SODIUM SERPL-SCNC: 129 MMOL/L (ref 133–144)
TRIGL SERPL-MCNC: 87 MG/DL
TSH SERPL DL<=0.005 MIU/L-ACNC: 3.7 MU/L (ref 0.4–4)

## 2018-06-18 PROCEDURE — 80061 LIPID PANEL: CPT | Performed by: PHYSICIAN ASSISTANT

## 2018-06-18 PROCEDURE — 80069 RENAL FUNCTION PANEL: CPT | Performed by: PHYSICIAN ASSISTANT

## 2018-06-18 PROCEDURE — 84443 ASSAY THYROID STIM HORMONE: CPT | Performed by: PHYSICIAN ASSISTANT

## 2018-06-18 PROCEDURE — 36415 COLL VENOUS BLD VENIPUNCTURE: CPT | Performed by: PHYSICIAN ASSISTANT

## 2018-06-21 ENCOUNTER — TELEPHONE (OUTPATIENT)
Dept: FAMILY MEDICINE | Facility: CLINIC | Age: 78
End: 2018-06-21

## 2018-06-21 ENCOUNTER — OFFICE VISIT (OUTPATIENT)
Dept: FAMILY MEDICINE | Facility: CLINIC | Age: 78
End: 2018-06-21
Payer: COMMERCIAL

## 2018-06-21 VITALS
RESPIRATION RATE: 20 BRPM | SYSTOLIC BLOOD PRESSURE: 148 MMHG | DIASTOLIC BLOOD PRESSURE: 86 MMHG | TEMPERATURE: 98.4 F | OXYGEN SATURATION: 96 % | HEIGHT: 62 IN | WEIGHT: 153 LBS | BODY MASS INDEX: 28.16 KG/M2 | HEART RATE: 96 BPM

## 2018-06-21 DIAGNOSIS — Z71.6 ENCOUNTER FOR SMOKING CESSATION COUNSELING: ICD-10-CM

## 2018-06-21 DIAGNOSIS — E03.8 OTHER SPECIFIED HYPOTHYROIDISM: ICD-10-CM

## 2018-06-21 DIAGNOSIS — Z00.00 MEDICARE ANNUAL WELLNESS VISIT, SUBSEQUENT: Primary | ICD-10-CM

## 2018-06-21 DIAGNOSIS — I10 HYPERTENSION GOAL BP (BLOOD PRESSURE) < 140/90: ICD-10-CM

## 2018-06-21 PROCEDURE — G0439 PPPS, SUBSEQ VISIT: HCPCS | Performed by: PHYSICIAN ASSISTANT

## 2018-06-21 RX ORDER — LISINOPRIL 20 MG/1
20 TABLET ORAL DAILY
Qty: 30 TABLET | Refills: 1 | Status: SHIPPED | OUTPATIENT
Start: 2018-06-21 | End: 2018-07-18

## 2018-06-21 RX ORDER — VARENICLINE TARTRATE 1 MG/1
1 TABLET, FILM COATED ORAL 2 TIMES DAILY
Qty: 56 TABLET | Refills: 2 | Status: SHIPPED | OUTPATIENT
Start: 2018-06-21 | End: 2019-04-09

## 2018-06-21 RX ORDER — AMLODIPINE BESYLATE 2.5 MG/1
2.5 TABLET ORAL DAILY
Qty: 30 TABLET | Refills: 1 | Status: SHIPPED | OUTPATIENT
Start: 2018-06-21 | End: 2018-07-18

## 2018-06-21 RX ORDER — LEVOTHYROXINE SODIUM 75 UG/1
75 TABLET ORAL DAILY
Qty: 90 TABLET | Refills: 3 | Status: SHIPPED | OUTPATIENT
Start: 2018-06-21 | End: 2018-07-18

## 2018-06-21 ASSESSMENT — ACTIVITIES OF DAILY LIVING (ADL)
CURRENT_FUNCTION: NO ASSISTANCE NEEDED
I_NEED_ASSISTANCE_FOR_THE_FOLLOWING_DAILY_ACTIVITIES:: NO ASSISTANCE IS NEEDED

## 2018-06-21 ASSESSMENT — PAIN SCALES - GENERAL: PAINLEVEL: NO PAIN (0)

## 2018-06-21 NOTE — PROGRESS NOTES
SUBJECTIVE:   Claire Moore is a 77 year old female who presents for Preventive Visit.    Are you in the first 12 months of your Medicare Part B coverage?  No    Healthy Habits:    Do you get at least three servings of calcium containing foods daily (dairy, green leafy vegetables, etc.)? yes    Amount of exercise or daily activities, outside of work: active    Problems taking medications regularly No    Medication side effects: No    Have you had an eye exam in the past two years? yes    Do you see a dentist twice per year?     Do you have sleep apnea, excessive snoring or daytime drowsiness?      Ability to successfully perform activities of daily living: Yes, no assistance needed    Home safety:  none identified     Hearing impairment: No    Fall risk:  Fallen 2 or more times in the past year?: No  Any fall with injury in the past year?: No      COGNITIVE SCREEN  1) Repeat 3 items (Banana, Sunrise, Chair)    2) Clock draw: NORMAL  3) 3 item recall: Recalls 3 objects  Results: 3 items recalled: COGNITIVE IMPAIRMENT LESS LIKELY    Mini-CogTM Copyright LUIS Ya. Licensed by the author for use in [facility]; reprinted with permission ([email or web address]). All rights reserved.            Reviewed and updated as needed this visit by clinical staff  Allergies  Meds         Reviewed and updated as needed this visit by Provider        Social History   Substance Use Topics     Smoking status: Former Smoker     Types: Cigarettes     Quit date: 2/1/2011     Smokeless tobacco: Never Used     Alcohol use Yes      Comment: occ       If you drink alcohol do you typically have >3 drinks per day or >7 drinks per week? No                        Today's PHQ-2 Score:   PHQ-2 ( 1999 [employer]) 6/21/2018 6/12/2017   Q1: Little interest or pleasure in doing things - 0   Q2: Feeling down, depressed or hopeless - 0   PHQ-2 Score - 0   PHQ-2 Score Incomplete -       Do you feel safe in your environment - Yes    Do you have a Health  Care Directive?: Yes: Patient states has Advance Directive and will bring in a copy to clinic.    Current providers sharing in care for this patient include:   Patient Care Team:  Kehr, Kristen M, PA-C as PCP - General    The following health maintenance items are reviewed in Epic and correct as of today:  Health Maintenance   Topic Date Due     EYE EXAM Q1 YEAR  06/05/2018     FALL RISK ASSESSMENT  06/12/2018     PHQ-2 Q1 YR  06/12/2018     INFLUENZA VACCINE (Season Ended) 09/01/2018     ADVANCE DIRECTIVE PLANNING Q5 YRS  06/12/2022     LIPID SCREEN Q5 YR FEMALE (SYSTEM ASSIGNED)  06/18/2023     TETANUS IMMUNIZATION (SYSTEM ASSIGNED)  05/18/2026     DEXA SCAN SCREENING (SYSTEM ASSIGNED)  Completed     PNEUMOCOCCAL  Completed     BP Readings from Last 3 Encounters:   06/21/18 148/86   02/27/18 152/87   02/09/18 143/68    Wt Readings from Last 3 Encounters:   06/21/18 153 lb (69.4 kg)   02/27/18 150 lb (68 kg)   02/07/18 150 lb (68 kg)                  Patient Active Problem List   Diagnosis     Hypertension goal BP (blood pressure) < 140/90     Hyperlipidemia LDL goal <130     Advanced directives, counseling/discussion     DJD (degenerative joint disease)     Pseudophakia, od     Blind hypotensive eye, left     History of retinal detachment, os     Posterior vitreous detachment, right     Other specified hypothyroidism     Appendicitis     S/P laparoscopic appendectomy     Past Surgical History:   Procedure Laterality Date     CATARACT IOL, RT/LT       COLPORRHAPHY ANTERIOR N/A 3/30/2017    Procedure: COLPORRHAPHY ANTERIOR;  Surgeon: Rene Mitchell MD;  Location: [facility] OR     CYSTOSCOPY, SLING TRANSVAGINAL N/A 3/30/2017    Procedure: CYSTOSCOPY, SLING TRANSVAGINAL;  Surgeon: Rene Mitchell MD;  Location: [facility] OR     D & C       LAPAROSCOPIC APPENDECTOMY N/A 2/8/2018    Procedure: LAPAROSCOPIC APPENDECTOMY;  Laparoscopic appendectomy;  Surgeon: Glenn Pacheco MD;  Location: [facility]     PHACOEMULSIFICATION  WITH STANDARD INTRAOCULAR LENS IMPLANT  5/2015    right eye     RETINAL REATTACHMENT  remotely    right eye     TONSILLECTOMY       TUBAL LIGATION         Social History   Substance Use Topics     Smoking status: Former Smoker     Types: Cigarettes     Quit date: 2/1/2011     Smokeless tobacco: Never Used     Alcohol use Yes      Comment: occ     Family History   Problem Relation Age of Onset     Alzheimer Disease Mother      Osteoperosis Mother      Hypertension Mother      Prostate Cancer Father      Osteoperosis Maternal Grandmother      Cancer Brother      Eye Disorder Brother      Eye Disorder Sister      Eye Disorder Son      Eye Disorder Brother      Eye Disorder Brother      Cerebrovascular Disease Paternal Grandmother      Diabetes No family hx of      Thyroid Disease No family hx of      Glaucoma No family hx of      Macular Degeneration No family hx of          Current Outpatient Prescriptions   Medication Sig Dispense Refill     amLODIPine (NORVASC) 2.5 MG tablet Take 1 tablet (2.5 mg) by mouth daily 30 tablet 1     aspirin 81 MG tablet Take  by mouth daily.       CALCIUM 500 OR 1 per day        cholecalciferol (VITAMIN D) 1000 UNIT tablet Take 1 tablet by mouth daily.       levothyroxine (SYNTHROID/LEVOTHROID) 75 MCG tablet Take 1 tablet (75 mcg) by mouth daily 90 tablet 3     lisinopril (PRINIVIL/ZESTRIL) 20 MG tablet Take 1 tablet (20 mg) by mouth daily 30 tablet 1     Multiple Vitamin (MULTI-DAY VITAMINS PO) Take  by mouth. 1 tab daily       simvastatin (ZOCOR) 20 MG tablet Take 1 tablet (20 mg) by mouth daily 90 tablet 3     varenicline (CHANTIX STARTING MONTH FILIBERTO) 0.5 MG X 11 & 1 MG X 42 tablet Take 0.5 mg tab daily for 3 days, then 0.5 mg tab twice daily for 4 days, then 1 mg twice daily. 53 tablet 0     varenicline (CHANTIX) 1 MG tablet Take 1 tablet (1 mg) by mouth 2 times daily 56 tablet 2     [DISCONTINUED] levothyroxine (SYNTHROID/LEVOTHROID) 75 MCG tablet TAKE ONE TABLET BY MOUTH ONCE DAILY  "30 tablet 0     [DISCONTINUED] levothyroxine (SYNTHROID/LEVOTHROID) 75 MCG tablet Take 1 tablet (75 mcg) by mouth daily 90 tablet 3     No Known Allergies        Pneumonia Vaccine:declines  Mammogram Screening: declines    ROS:  Constitutional, HEENT, cardiovascular, pulmonary, GI, , musculoskeletal, neuro, skin, endocrine and psych systems are negative, except as otherwise noted.    OBJECTIVE:   /86  Pulse 96  Temp 98.4  F (36.9  C) (Oral)  Resp 20  Ht 5' 2\" (1.575 m)  Wt 153 lb (69.4 kg)  SpO2 96%  BMI 27.98 kg/m2 Estimated body mass index is 27.98 kg/(m^2) as calculated from the following:    Height as of this encounter: 5' 2\" (1.575 m).    Weight as of this encounter: 153 lb (69.4 kg).  EXAM:   GENERAL: healthy, alert and no distress  EYES: Eyes grossly normal to inspection, PERRL and conjunctivae and sclerae normal  HENT: ear canals and TM's normal, nose and mouth without ulcers or lesions  NECK: no adenopathy, no asymmetry, masses, or scars and thyroid normal to palpation  RESP: lungs clear to auscultation - no rales, rhonchi or wheezes  CV: regular rate and rhythm, normal S1 S2, no S3 or S4, no murmur, click or rub, no peripheral edema and peripheral pulses strong  ABDOMEN: soft, nontender, no hepatosplenomegaly, no masses and bowel sounds normal  MS: no gross musculoskeletal defects noted, no edema  SKIN: no suspicious lesions or rashes  NEURO: Normal strength and tone, mentation intact and speech normal  PSYCH: mentation appears normal, affect normal/bright    Results for orders placed or performed in visit on 06/18/18   Lipid panel reflex to direct LDL Fasting   Result Value Ref Range    Cholesterol 164 <200 mg/dL    Triglycerides 87 <150 mg/dL    HDL Cholesterol 76 >49 mg/dL    LDL Cholesterol Calculated 71 <100 mg/dL    Non HDL Cholesterol 88 <130 mg/dL   Renal panel   Result Value Ref Range    Sodium 129 (L) 133 - 144 mmol/L    Potassium 3.8 3.4 - 5.3 mmol/L    Chloride 95 94 - 109 mmol/L "    Carbon Dioxide 27 20 - 32 mmol/L    Anion Gap 7 3 - 14 mmol/L    Glucose 87 70 - 99 mg/dL    Urea Nitrogen 15 7 - 30 mg/dL    Creatinine 0.72 0.52 - 1.04 mg/dL    GFR Estimate 77 >60 mL/min/1.7m2    GFR Estimate If Black >90 >60 mL/min/1.7m2    Calcium 8.9 8.5 - 10.1 mg/dL    Phosphorus 3.3 2.5 - 4.5 mg/dL    Albumin 4.0 3.4 - 5.0 g/dL   TSH with free T4 reflex   Result Value Ref Range    TSH 3.70 0.40 - 4.00 mU/L         ASSESSMENT / PLAN:   1. Medicare annual wellness visit, subsequent  Health maintenance reviewed and updated.    2. Hypertension goal BP (blood pressure) < 140/90  Stop the HCTZ due to low sodium and she needs better control.   She is going to continue with the lisinopril 20 mg daily and add 2.5 mg of the norvasc.   Return in 1 month for recheck.   She is getting back on the chantix for smoking cessation and that will also help.   Stress reduction with her living situation is also improving.   - amLODIPine (NORVASC) 2.5 MG tablet; Take 1 tablet (2.5 mg) by mouth daily  Dispense: 30 tablet; Refill: 1  - lisinopril (PRINIVIL/ZESTRIL) 20 MG tablet; Take 1 tablet (20 mg) by mouth daily  Dispense: 30 tablet; Refill: 1    3. Other specified hypothyroidism  Stable, refills given  - levothyroxine (SYNTHROID/LEVOTHROID) 75 MCG tablet; Take 1 tablet (75 mcg) by mouth daily  Dispense: 90 tablet; Refill: 3    4. Encounter for smoking cessation counseling  See above.   She is aware of how to use the medication and wanting to quit.   I will see her again in the next month for a blood pressure check and will follow up on this also.   - varenicline (CHANTIX STARTING MONTH FILIBERTO) 0.5 MG X 11 & 1 MG X 42 tablet; Take 0.5 mg tab daily for 3 days, then 0.5 mg tab twice daily for 4 days, then 1 mg twice daily.  Dispense: 53 tablet; Refill: 0  - varenicline (CHANTIX) 1 MG tablet; Take 1 tablet (1 mg) by mouth 2 times daily  Dispense: 56 tablet; Refill: 2    End of Life Planning:  Patient currently has an advanced  "directive: No.  I have verified the patient's ablity to prepare an advanced directive/make health care decisions.  Literature was provided to assist patient in preparing an advanced directive.    COUNSELING:  Reviewed preventive health counseling, as reflected in patient instructions       Regular exercise       Healthy diet/nutrition       Dental care       Immunizations    Declined: Pneumococcal and Zoster due to Conscientious objector               Consider lung cancer screening for ages 55-80 years and 30 pack-year smoking history         Estimated body mass index is 27.98 kg/(m^2) as calculated from the following:    Height as of this encounter: 5' 2\" (1.575 m).    Weight as of this encounter: 153 lb (69.4 kg).  Weight management plan: Discussed healthy diet and exercise guidelines and patient will follow up in 12 months in clinic to re-evaluate.     reports that she quit smoking about 7 years ago. Her smoking use included Cigarettes. She has never used smokeless tobacco.  Tobacco Cessation Action Plan: Pharmacotherapies : Chantix    Appropriate preventive services were discussed with this patient, including applicable screening as appropriate for cardiovascular disease, diabetes, osteopenia/osteoporosis, and glaucoma.  As appropriate for age/gender, discussed screening for colorectal cancer, prostate cancer, breast cancer, and cervical cancer. Checklist reviewing preventive services available has been given to the patient.    Reviewed patients plan of care and provided an AVS. The Basic Care Plan (routine screening as documented in Health Maintenance) for Claire meets the Care Plan requirement. This Care Plan has been established and reviewed with the Patient.    Counseling Resources:  ATP IV Guidelines  Pooled Cohorts Equation Calculator  Breast Cancer Risk Calculator  FRAX Risk Assessment  ICSI Preventive Guidelines  Dietary Guidelines for Americans, 2010  USDA's MyPlate  ASA Prophylaxis  Lung CA " Screening    Kristen M. Kehr, PA-C  [facility]

## 2018-06-21 NOTE — NURSING NOTE
"Chief Complaint   Patient presents with     Wellness Visit     Health Maintenance     fall risk, phq-2       Initial BP (!) 177/94  Pulse 96  Temp 98.4  F (36.9  C) (Oral)  Resp 20  Ht 5' 2\" (1.575 m)  Wt 153 lb (69.4 kg)  SpO2 96%  BMI 27.98 kg/m2 Estimated body mass index is 27.98 kg/(m^2) as calculated from the following:    Height as of this encounter: 5' 2\" (1.575 m).    Weight as of this encounter: 153 lb (69.4 kg).  Medication Reconciliation: complete    ARABELLA Shah MA    "

## 2018-06-21 NOTE — MR AVS SNAPSHOT
After Visit Summary   6/21/2018    Claire Moore    MRN: [id number]           Patient Information     Date Of Birth          1940        Visit Information        Provider Department      6/21/2018 9:40 AM Kehr, Kristen M, PA-C [facility]        Today's Diagnoses     Medicare annual wellness visit, initial    -  1    Hypertension goal BP (blood pressure) < 140/90        Other specified hypothyroidism        Encounter for smoking cessation counseling          Care Instructions      Preventive Health Recommendations    Female Ages 65 +    Yearly exam:     See your health care provider every year in order to  o Review health changes.   o Discuss preventive care.    o Review your medicines if your doctor has prescribed any.      You no longer need a yearly Pap test unless you've had an abnormal Pap test in the past 10 years. If you have vaginal symptoms, such as bleeding or discharge, be sure to talk with your provider about a Pap test.      Every 1 to 2 years, have a mammogram.  If you are over 69, talk with your health care provider about whether or not you want to continue having screening mammograms.      Every 10 years, have a colonoscopy. Or, have a yearly FIT test (stool test). These exams will check for colon cancer.       Have a cholesterol test every 5 years, or more often if your doctor advises it.       Have a diabetes test (fasting glucose) every three years. If you are at risk for diabetes, you should have this test more often.       At age 65, have a bone density scan (DEXA) to check for osteoporosis (brittle bone disease).    Shots:    Get a flu shot each year.    Get a tetanus shot every 10 years.    Talk to your doctor about your pneumonia vaccines. There are now two you should receive - Pneumovax (PPSV 23) and Prevnar (PCV 13).    Talk to your doctor about the shingles vaccine.    Talk to your doctor about the hepatitis B vaccine.    Nutrition:     Eat at least 5  servings of fruits and vegetables each day.      Eat whole-grain bread, whole-wheat pasta and brown rice instead of white grains and rice.      Talk to your provider about Calcium and Vitamin D.     Lifestyle    Exercise at least 150 minutes a week (30 minutes a day, 5 days a week). This will help you control your weight and prevent disease.      Limit alcohol to one drink per day.      No smoking.       Wear sunscreen to prevent skin cancer.       See your dentist twice a year for an exam and cleaning.      See your eye doctor every 1 to 2 years to screen for conditions such as glaucoma, macular degeneration and cataracts.    Preventive Health Recommendations    Female Ages 65 +    Yearly exam:     See your health care provider every year in order to  o Review health changes.   o Discuss preventive care.    o Review your medicines if your doctor has prescribed any.      You no longer need a yearly Pap test unless you've had an abnormal Pap test in the past 10 years. If you have vaginal symptoms, such as bleeding or discharge, be sure to talk with your provider about a Pap test.      Every 1 to 2 years, have a mammogram.  If you are over 69, talk with your health care provider about whether or not you want to continue having screening mammograms.      Every 10 years, have a colonoscopy. Or, have a yearly FIT test (stool test). These exams will check for colon cancer.       Have a cholesterol test every 5 years, or more often if your doctor advises it.       Have a diabetes test (fasting glucose) every three years. If you are at risk for diabetes, you should have this test more often.       At age 65, have a bone density scan (DEXA) to check for osteoporosis (brittle bone disease).    Shots:    Get a flu shot each year.    Get a tetanus shot every 10 years.    Talk to your doctor about your pneumonia vaccines. There are now two you should receive - Pneumovax (PPSV 23) and Prevnar (PCV 13).    Talk to your doctor  about the shingles vaccine.    Talk to your doctor about the hepatitis B vaccine.    Nutrition:     Eat at least 5 servings of fruits and vegetables each day.      Eat whole-grain bread, whole-wheat pasta and brown rice instead of white grains and rice.      Talk to your provider about Calcium and Vitamin D.     Lifestyle    Exercise at least 150 minutes a week (30 minutes a day, 5 days a week). This will help you control your weight and prevent disease.      Limit alcohol to one drink per day.      No smoking.       Wear sunscreen to prevent skin cancer.       See your dentist twice a year for an exam and cleaning.      See your eye doctor every 1 to 2 years to screen for conditions such as glaucoma, macular degeneration and cataracts.          Follow-ups after your visit        Follow-up notes from your care team     Return in about 4 weeks (around 7/19/2018) for BP Recheck.      Your next 10 appointments already scheduled     Jun 27, 2018  8:00 AM CDT   New Visit with Shemar Jesus MD   [facility] ([address] [address] [phone]              Who to contact     If you have questions or need follow up information about today's clinic visit or your schedule please contact [facility] directly at [phone].  Normal or non-critical lab and imaging results will be communicated to you by MyChart, letter or phone within 4 business days after the clinic has received the results. If you do not hear from us within 7 days, please contact the clinic through MyChart or phone. If you have a critical or abnormal lab result, we will notify you by phone as soon as possible.  Submit refill requests through [employer] or call your pharmacy and they will forward the refill request to us. Please allow 3 business days for your refill to be completed.          Additional Information About Your Visit        Care EveryWhere ID     This is your Care  "EveryWhere ID. This could be used by other organizations to access your [place] medical records  [id number]        Your Vitals Were     Pulse Temperature Respirations Height Pulse Oximetry BMI (Body Mass Index)    96 98.4  F (36.9  C) (Oral) 20 5' 2\" (1.575 m) 96% 27.98 kg/m2       Blood Pressure from Last 3 Encounters:   06/21/18 (!) 177/94   02/27/18 152/87   02/09/18 143/68    Weight from Last 3 Encounters:   06/21/18 153 lb (69.4 kg)   02/27/18 150 lb (68 kg)   02/07/18 150 lb (68 kg)              Today, you had the following     No orders found for display         Today's Medication Changes          These changes are accurate as of 6/21/18 10:15 AM.  If you have any questions, ask your nurse or doctor.               Start taking these medicines.        Dose/Directions    amLODIPine 2.5 MG tablet   Commonly known as:  NORVASC   Used for:  Hypertension goal BP (blood pressure) < 140/90        Dose:  2.5 mg   Take 1 tablet (2.5 mg) by mouth daily   Quantity:  30 tablet   Refills:  1       lisinopril 20 MG tablet   Commonly known as:  PRINIVIL/ZESTRIL   Used for:  Hypertension goal BP (blood pressure) < 140/90        Dose:  20 mg   Take 1 tablet (20 mg) by mouth daily   Quantity:  30 tablet   Refills:  1       * varenicline 0.5 MG X 11 & 1 MG X 42 tablet   Commonly known as:  CHANTIX STARTING MONTH PAK   Used for:  Encounter for smoking cessation counseling        Take 0.5 mg tab daily for 3 days, then 0.5 mg tab twice daily for 4 days, then 1 mg twice daily.   Quantity:  53 tablet   Refills:  0       * varenicline 1 MG tablet   Commonly known as:  CHANTIX   Used for:  Encounter for smoking cessation counseling        Dose:  1 mg   Take 1 tablet (1 mg) by mouth 2 times daily   Quantity:  56 tablet   Refills:  2       * Notice:  This list has 2 medication(s) that are the same as other medications prescribed for you. Read the directions carefully, and ask your doctor or other care provider to review them with you. "      These medicines have changed or have updated prescriptions.        Dose/Directions    levothyroxine 75 MCG tablet   Commonly known as:  SYNTHROID/LEVOTHROID   This may have changed:  See the new instructions.   Used for:  Other specified hypothyroidism        Dose:  75 mcg   Take 1 tablet (75 mcg) by mouth daily   Quantity:  90 tablet   Refills:  3         Stop taking these medicines if you haven't already. Please contact your care team if you have questions.     lisinopril-hydrochlorothiazide 20-25 MG per tablet   Commonly known as:  PRINZIDE/ZESTORETIC                Where to get your medicines      These medications were sent to [facility] - [place], [place] - [address]  [address] [facility] [address]     Phone:  [phone]     amLODIPine 2.5 MG tablet    lisinopril 20 MG tablet         Some of these will need a paper prescription and others can be bought over the counter.  Ask your nurse if you have questions.     Bring a paper prescription for each of these medications     levothyroxine 75 MCG tablet    varenicline 0.5 MG X 11 & 1 MG X 42 tablet    varenicline 1 MG tablet                Primary Care Provider Office Phone # Fax #    Kristen M Kehr, PA-C [phone] [address] [facility] [address]        Equal Access to Services     JERRY BAUTISTA : Hadii francesca ku hadasho Soomaali, waaxda luqadaha, qaybta kaalmada adeegyada, waxay gadiel salazar. So [facility] [phone].    ATENCIÓN: Si habla español, tiene a khan disposición servicios gratuitos de asistencia lingüística. ame al [phone].    We comply with applicable federal civil rights laws and [place] laws. We do not discriminate on the basis of race, color, national origin, age, disability, sex, sexual orientation, or gender identity.            Thank you!     Thank you for choosing [facility]  for your care. Our goal is always to provide you with excellent care.  Hearing back from our patients is one way we can continue to improve our services. Please take a few minutes to complete the written survey that you may receive in the mail after your visit with us. Thank you!             Your Updated Medication List - Protect others around you: Learn how to safely use, store and throw away your medicines at www.disposemymeds.org.          This list is accurate as of 6/21/18 10:15 AM.  Always use your most recent med list.                   Brand Name Dispense Instructions for use Diagnosis    amLODIPine 2.5 MG tablet    NORVASC    30 tablet    Take 1 tablet (2.5 mg) by mouth daily    Hypertension goal BP (blood pressure) < 140/90       aspirin 81 MG tablet      Take  by mouth daily.        CALCIUM 500 PO      1 per day        cholecalciferol 1000 UNIT tablet    vitamin D3     Take 1 tablet by mouth daily.        levothyroxine 75 MCG tablet    SYNTHROID/LEVOTHROID    90 tablet    Take 1 tablet (75 mcg) by mouth daily    Other specified hypothyroidism       lisinopril 20 MG tablet    PRINIVIL/ZESTRIL    30 tablet    Take 1 tablet (20 mg) by mouth daily    Hypertension goal BP (blood pressure) < 140/90       MULTI-DAY VITAMINS PO      Take  by mouth. 1 tab daily        simvastatin 20 MG tablet    ZOCOR    90 tablet    Take 1 tablet (20 mg) by mouth daily    Hyperlipidemia LDL goal <130       * varenicline 0.5 MG X 11 & 1 MG X 42 tablet    CHANTIX STARTING MONTH FILIBERTO    53 tablet    Take 0.5 mg tab daily for 3 days, then 0.5 mg tab twice daily for 4 days, then 1 mg twice daily.    Encounter for smoking cessation counseling       * varenicline 1 MG tablet    CHANTIX    56 tablet    Take 1 tablet (1 mg) by mouth 2 times daily    Encounter for smoking cessation counseling       * Notice:  This list has 2 medication(s) that are the same as other medications prescribed for you. Read the directions carefully, and ask your doctor or other care provider to review them with you.

## 2018-06-22 ENCOUNTER — TELEPHONE (OUTPATIENT)
Dept: FAMILY MEDICINE | Facility: CLINIC | Age: 78
End: 2018-06-22

## 2018-06-22 NOTE — TELEPHONE ENCOUNTER
Prescription has been faxed on 2 separate occassions.    Spoke to pharmacy, has new fax number [phone].  Prescription is refaxed to new fax number.  Kelley Mitchell RN

## 2018-06-27 ENCOUNTER — OFFICE VISIT (OUTPATIENT)
Dept: OPHTHALMOLOGY | Facility: CLINIC | Age: 78
End: 2018-06-27
Payer: COMMERCIAL

## 2018-06-27 DIAGNOSIS — H44.522 BLIND HYPOTENSIVE EYE, LEFT: ICD-10-CM

## 2018-06-27 DIAGNOSIS — H52.4 PRESBYOPIA: ICD-10-CM

## 2018-06-27 DIAGNOSIS — Z01.01 ENCOUNTER FOR EXAMINATION OF EYES AND VISION WITH ABNORMAL FINDINGS: Primary | ICD-10-CM

## 2018-06-27 DIAGNOSIS — H43.811 POSTERIOR VITREOUS DETACHMENT, RIGHT: ICD-10-CM

## 2018-06-27 DIAGNOSIS — Z96.1 PSEUDOPHAKIA: ICD-10-CM

## 2018-06-27 DIAGNOSIS — Z86.69 HISTORY OF RETINAL DETACHMENT: ICD-10-CM

## 2018-06-27 PROCEDURE — 92015 DETERMINE REFRACTIVE STATE: CPT | Performed by: OPHTHALMOLOGY

## 2018-06-27 PROCEDURE — 92014 COMPRE OPH EXAM EST PT 1/>: CPT | Performed by: OPHTHALMOLOGY

## 2018-06-27 ASSESSMENT — TONOMETRY
OS_IOP_MMHG: 15
OD_IOP_MMHG: 23
IOP_METHOD: APPLANATION

## 2018-06-27 ASSESSMENT — EXTERNAL EXAM - LEFT EYE: OS_EXAM: 2+ BROW PTOSIS

## 2018-06-27 ASSESSMENT — VISUAL ACUITY
OS_CC: NLP
CORRECTION_TYPE: GLASSES
OD_CC: 20/30
OD_CC+: +1
OS_CC: >12
METHOD: SNELLEN - LINEAR
OD_CC: 4

## 2018-06-27 ASSESSMENT — REFRACTION_WEARINGRX
OD_CYLINDER: +0.50
SPECS_TYPE: PAL
OS_ADD: +3.00
OS_SPHERE: -1.00
OS_CYLINDER: +0.25
OD_ADD: +3.00
OD_SPHERE: -1.00
OD_AXIS: 046
OS_AXIS: 046

## 2018-06-27 ASSESSMENT — CONF VISUAL FIELD
OS_INFERIOR_NASAL_RESTRICTION: 1
OS_SUPERIOR_TEMPORAL_RESTRICTION: 1
OS_INFERIOR_TEMPORAL_RESTRICTION: 1
OD_NORMAL: 1
OS_SUPERIOR_NASAL_RESTRICTION: 1

## 2018-06-27 ASSESSMENT — SLIT LAMP EXAM - LIDS
COMMENTS: 1+ DERMATOCHALASIS - UPPER LID
COMMENTS: 1+ DERMATOCHALASIS - UPPER LID

## 2018-06-27 ASSESSMENT — REFRACTION_MANIFEST
OD_SPHERE: -0.50
OD_CYLINDER: +0.50
OD_ADD: +3.00
OD_AXIS: 082

## 2018-06-27 ASSESSMENT — CUP TO DISC RATIO: OD_RATIO: 0.2

## 2018-06-27 ASSESSMENT — EXTERNAL EXAM - RIGHT EYE: OD_EXAM: 2+ BROW PTOSIS

## 2018-06-27 NOTE — LETTER
6/27/2018         RE: Claire Moore  [address] [facility] [address]        Dear Colleague,    Thank you for referring your patient, Claire Moore, to the [facility]. Please see a copy of my visit note below.     Current Eye Medications:  None.       Subjective:  Comprehensive Eye Exam.  When riding in a car, other people are able to read the road signs sooner than she is able to see them.  No pain in her left eye.       Objective:  See Ophthalmology Exam.       Assessment:  Stable eye exam in patient who is monocular.      ICD-10-CM    1. Encounter for examination of eyes and vision with abnormal findings Z01.01 REFRACTIVE STATUS   2. Presbyopia H52.4 REFRACTIVE STATUS   3. Pseudophakia, od Z96.1 EYE EXAM (SIMPLE-NONBILLABLE)   4. History of retinal detachment, od Z86.69    5. 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 Posterior vitreous detachment, right H43.811         Plan:  Possible clouding of posterior capsule discussed right eye.  Glasses Rx given - optional   Return visit 6 months for intraocular pressure check.    Shemar Jesus M.D.  [phone]           Again, thank you for allowing me to participate in the care of your patient.        Sincerely,        Shemar Jesus MD

## 2018-06-27 NOTE — PROGRESS NOTES
Current Eye Medications:  None.       Subjective:  Comprehensive Eye Exam.  When riding in a car, other people are able to read the road signs sooner than she is able to see them.  No pain in her left eye.       Objective:  See Ophthalmology Exam.       Assessment:  Stable eye exam in patient who is monocular.      ICD-10-CM    1. Encounter for examination of eyes and vision with abnormal findings Z01.01 REFRACTIVE STATUS   2. Presbyopia H52.4 REFRACTIVE STATUS   3. Pseudophakia, od Z96.1 EYE EXAM (SIMPLE-NONBILLABLE)   4. History of retinal detachment, od Z86.69    5. 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 Posterior vitreous detachment, right H43.811         Plan:  Possible clouding of posterior capsule discussed right eye.  Glasses Rx given - optional   Return visit 6 months for intraocular pressure check.    Shemar Jesus M.D.  [phone]

## 2018-06-27 NOTE — MR AVS SNAPSHOT
After Visit Summary   6/27/2018    Claire Moore    MRN: [id number]           Patient Information     Date Of Birth          1940        Visit Information        Provider Department      6/27/2018 8:00 AM Shemar Jesus MD [facility]        Today's Diagnoses     Encounter for examination of eyes and vision with abnormal findings    -  1    Presbyopia        Blind hypotensive eye, left        Posterior vitreous detachment, right        Pseudophakia, od        History of retinal detachment, os          Care Instructions    Possible clouding of posterior capsule discussed right eye.  Glasses Rx given - optional   Return visit 6 months for intraocular pressure check.    Shemar Jesus M.D.  [phone]            Follow-ups after your visit        Your next 10 appointments already scheduled     Jul 18, 2018  9:40 AM CDT   SHORT with Kristen M Kehr, PA-C   [facility]    [address] [place] [address] [phone]              Who to contact     If you have questions or need follow up information about today's clinic visit or your schedule please contact [facility] directly at [phone].  Normal or non-critical lab and imaging results will be communicated to you by MyChart, letter or phone within 4 business days after the clinic has received the results. If you do not hear from us within 7 days, please contact the clinic through MyChart or phone. If you have a critical or abnormal lab result, we will notify you by phone as soon as possible.  Submit refill requests through [employer] or call your pharmacy and they will forward the refill request to us. Please allow 3 business days for your refill to be completed.          Additional Information About Your Visit        Care EveryWhere ID     This is your Care EveryWhere ID. This could be used by other organizations to access your [facility]  records  [id number]         Blood Pressure from Last 3 Encounters:   06/21/18 148/86   02/27/18 152/87   02/09/18 143/68    Weight from Last 3 Encounters:   06/21/18 69.4 kg (153 lb)   02/27/18 68 kg (150 lb)   02/07/18 68 kg (150 lb)              We Performed the Following     EYE EXAM (SIMPLE-NONBILLABLE)     REFRACTIVE STATUS        Primary Care Provider Office Phone # Fax #    Kristen M Kehr, PA-C [phone] [address] [facility] [address]        Equal Access to Services     [facility]: Hadii aad ku hadasho Soomaali, waaxda luqadaha, qaybta kaalmada adeegyada, waxnupur ramesh hayjustinn jude francisco . So [place] [phone].    ATENCIÓN: Si habla español, tiene a khan disposición servicios gratuitos de asistencia lingüística. [facility] [phone].    We comply with applicable federal civil rights laws and [place] laws. We do not discriminate on the basis of race, color, national origin, age, disability, sex, sexual orientation, or gender identity.            Thank you!     Thank you for choosing [facility]  for your care. Our goal is always to provide you with excellent care. Hearing back from our patients is one way we can continue to improve our services. Please take a few minutes to complete the written survey that you may receive in the mail after your visit with us. Thank you!             Your Updated Medication List - Protect others around you: Learn how to safely use, store and throw away your medicines at www.disposemymeds.org.          This list is accurate as of 6/27/18  9:03 AM.  Always use your most recent med list.                   Brand Name Dispense Instructions for use Diagnosis    amLODIPine 2.5 MG tablet    NORVASC    30 tablet    Take 1 tablet (2.5 mg) by mouth daily    Hypertension goal BP (blood pressure) < 140/90       aspirin 81 MG tablet      Take  by mouth daily.        CALCIUM 500 PO      1 per day        cholecalciferol 1000 UNIT tablet     vitamin D3     Take 1 tablet by mouth daily.        levothyroxine 75 MCG tablet    SYNTHROID/LEVOTHROID    90 tablet    Take 1 tablet (75 mcg) by mouth daily    Other specified hypothyroidism       lisinopril 20 MG tablet    PRINIVIL/ZESTRIL    30 tablet    Take 1 tablet (20 mg) by mouth daily    Hypertension goal BP (blood pressure) < 140/90       MULTI-DAY VITAMINS PO      Take  by mouth. 1 tab daily        simvastatin 20 MG tablet    ZOCOR    90 tablet    Take 1 tablet (20 mg) by mouth daily    Hyperlipidemia LDL goal <130       * varenicline 0.5 MG X 11 & 1 MG X 42 tablet    CHANTIX STARTING MONTH FILIBERTO    53 tablet    Take 0.5 mg tab daily for 3 days, then 0.5 mg tab twice daily for 4 days, then 1 mg twice daily.    Encounter for smoking cessation counseling       * varenicline 1 MG tablet    CHANTIX    56 tablet    Take 1 tablet (1 mg) by mouth 2 times daily    Encounter for smoking cessation counseling       * Notice:  This list has 2 medication(s) that are the same as other medications prescribed for you. Read the directions carefully, and ask your doctor or other care provider to review them with you.

## 2018-06-27 NOTE — PATIENT INSTRUCTIONS
Possible clouding of posterior capsule discussed right eye.  Glasses Rx given - optional   Return visit 6 months for intraocular pressure check.    Shemar Jesus M.D.  [phone]

## 2018-07-02 DIAGNOSIS — E03.8 OTHER SPECIFIED HYPOTHYROIDISM: ICD-10-CM

## 2018-07-05 RX ORDER — LEVOTHYROXINE SODIUM 75 UG/1
TABLET ORAL
Qty: 90 TABLET | Refills: 3 | Status: SHIPPED | OUTPATIENT
Start: 2018-07-05 | End: 2019-06-25

## 2018-07-06 DIAGNOSIS — E03.8 OTHER SPECIFIED HYPOTHYROIDISM: ICD-10-CM

## 2018-07-06 RX ORDER — LEVOTHYROXINE SODIUM 75 UG/1
75 TABLET ORAL DAILY
Qty: 90 TABLET | Refills: 3 | OUTPATIENT
Start: 2018-07-06

## 2018-07-17 NOTE — PROGRESS NOTES
SUBJECTIVE:   Claire Moore is a 78 year old female who presents to clinic today for the following health issues:      Hypertension Follow-up      Outpatient blood pressures are not being checked.    Low Salt Diet:       Amount of exercise or physical activity:     Problems taking medications regularly: No    Medication side effects: none    Diet:           Problem list and histories reviewed & adjusted, as indicated.  Additional history: as documented    Patient Active Problem List   Diagnosis     Hypertension goal BP (blood pressure) < 140/90     Hyperlipidemia LDL goal <130     Advanced directives, counseling/discussion     DJD (degenerative joint disease)     Pseudophakia, od     Blind hypotensive eye, left     History of retinal detachment, od     Posterior vitreous detachment, right     Other specified hypothyroidism     Appendicitis     S/P laparoscopic appendectomy     Past Surgical History:   Procedure Laterality Date     CATARACT IOL, RT/LT       COLPORRHAPHY ANTERIOR N/A 3/30/2017    Procedure: COLPORRHAPHY ANTERIOR;  Surgeon: Rene Mitchell MD;  Location: [facility]     CYSTOSCOPY, SLING TRANSVAGINAL N/A 3/30/2017    Procedure: CYSTOSCOPY, SLING TRANSVAGINAL;  Surgeon: Rene Mitchell MD;  Location: [facility] OR     D & C       LAPAROSCOPIC APPENDECTOMY N/A 2/8/2018    Procedure: LAPAROSCOPIC APPENDECTOMY;  Laparoscopic appendectomy;  Surgeon: Glenn Pacheco MD;  Location: [facility]     PHACOEMULSIFICATION WITH STANDARD INTRAOCULAR LENS IMPLANT  5/2015    right eye     RETINAL REATTACHMENT  remotely    right eye     TONSILLECTOMY       TUBAL LIGATION         Social History   Substance Use Topics     Smoking status: Former Smoker     Types: Cigarettes     Quit date: 2/1/2011     Smokeless tobacco: Never Used     Alcohol use Yes      Comment: occ     Family History   Problem Relation Age of Onset     Alzheimer Disease Mother      Osteoperosis Mother      Hypertension Mother      Prostate Cancer Father       Osteoperosis Maternal Grandmother      Cancer Brother      Eye Disorder Brother      Eye Disorder Sister      Eye Disorder Son      Eye Disorder Brother      Eye Disorder Brother      Cerebrovascular Disease Paternal Grandmother      Diabetes No family hx of      Thyroid Disease No family hx of      Glaucoma No family hx of      Macular Degeneration No family hx of          Current Outpatient Prescriptions   Medication Sig Dispense Refill     amLODIPine (NORVASC) 2.5 MG tablet Take 1 tablet (2.5 mg) by mouth daily 90 tablet 1     aspirin 81 MG tablet Take  by mouth daily.       CALCIUM 500 OR 1 per day        cholecalciferol (VITAMIN D) 1000 UNIT tablet Take 1 tablet by mouth daily.       levothyroxine (SYNTHROID/LEVOTHROID) 75 MCG tablet TAKE ONE TABLET BY MOUTH ONCE DAILY 90 tablet 3     lisinopril (PRINIVIL/ZESTRIL) 20 MG tablet Take 1 tablet (20 mg) by mouth daily 90 tablet 1     Multiple Vitamin (MULTI-DAY VITAMINS PO) Take  by mouth. 1 tab daily       simvastatin (ZOCOR) 20 MG tablet Take 1 tablet (20 mg) by mouth daily 90 tablet 3     varenicline (CHANTIX STARTING MONTH PAK) 0.5 MG X 11 & 1 MG X 42 tablet Take 0.5 mg tab daily for 3 days, then 0.5 mg tab twice daily for 4 days, then 1 mg twice daily. 53 tablet 0     varenicline (CHANTIX) 1 MG tablet Take 1 tablet (1 mg) by mouth 2 times daily 56 tablet 2     [DISCONTINUED] amLODIPine (NORVASC) 2.5 MG tablet Take 1 tablet (2.5 mg) by mouth daily 30 tablet 1     [DISCONTINUED] levothyroxine (SYNTHROID/LEVOTHROID) 75 MCG tablet Take 1 tablet (75 mcg) by mouth daily 90 tablet 3     [DISCONTINUED] lisinopril (PRINIVIL/ZESTRIL) 20 MG tablet Take 1 tablet (20 mg) by mouth daily 30 tablet 1     [DISCONTINUED] simvastatin (ZOCOR) 20 MG tablet Take 1 tablet (20 mg) by mouth daily 90 tablet 3     No Known Allergies    Reviewed and updated as needed this visit by clinical staff       Reviewed and updated as needed this visit by Provider         ROS:  Constitutional,  HEENT, cardiovascular, pulmonary, gi and gu systems are negative, except as otherwise noted.    OBJECTIVE:     /68  Pulse 83  Temp 97.5  F (36.4  C) (Oral)  Resp 16  Wt 155 lb (70.3 kg)  SpO2 98%  BMI 28.35 kg/m2  Body mass index is 28.35 kg/(m^2).  GENERAL: healthy, alert and no distress  RESP: lungs clear to auscultation - no rales, rhonchi or wheezes  CV: regular rate and rhythm, normal S1 S2, no S3 or S4, no murmur, click or rub, no peripheral edema and peripheral pulses strong  MS: no gross musculoskeletal defects noted, no edema    Diagnostic Test Results:  none     ASSESSMENT/PLAN:       1. Hypertension goal BP (blood pressure) < 140/90  Continue with the norvasc and lisinopril combination. She is doing well. Recheck sodium level now that she is off of the HCTZ.   Recheck in 1 year.   - lisinopril (PRINIVIL/ZESTRIL) 20 MG tablet; Take 1 tablet (20 mg) by mouth daily  Dispense: 90 tablet; Refill: 1  - amLODIPine (NORVASC) 2.5 MG tablet; Take 1 tablet (2.5 mg) by mouth daily  Dispense: 90 tablet; Refill: 1  - Sodium    2. Hyperlipidemia LDL goal <130  - simvastatin (ZOCOR) 20 MG tablet; Take 1 tablet (20 mg) by mouth daily  Dispense: 90 tablet; Refill: 3      Kristen M. Kehr, PA-C  [facility]

## 2018-07-18 ENCOUNTER — OFFICE VISIT (OUTPATIENT)
Dept: FAMILY MEDICINE | Facility: CLINIC | Age: 78
End: 2018-07-18
Payer: COMMERCIAL

## 2018-07-18 VITALS
OXYGEN SATURATION: 98 % | SYSTOLIC BLOOD PRESSURE: 142 MMHG | WEIGHT: 155 LBS | DIASTOLIC BLOOD PRESSURE: 68 MMHG | TEMPERATURE: 97.5 F | HEART RATE: 83 BPM | BODY MASS INDEX: 28.35 KG/M2 | RESPIRATION RATE: 16 BRPM

## 2018-07-18 DIAGNOSIS — I10 HYPERTENSION GOAL BP (BLOOD PRESSURE) < 140/90: ICD-10-CM

## 2018-07-18 DIAGNOSIS — E78.5 HYPERLIPIDEMIA LDL GOAL <130: ICD-10-CM

## 2018-07-18 LAB — SODIUM SERPL-SCNC: 136 MMOL/L (ref 133–144)

## 2018-07-18 PROCEDURE — 36415 COLL VENOUS BLD VENIPUNCTURE: CPT | Performed by: PHYSICIAN ASSISTANT

## 2018-07-18 PROCEDURE — 99213 OFFICE O/P EST LOW 20 MIN: CPT | Performed by: PHYSICIAN ASSISTANT

## 2018-07-18 PROCEDURE — 84295 ASSAY OF SERUM SODIUM: CPT | Performed by: PHYSICIAN ASSISTANT

## 2018-07-18 RX ORDER — AMLODIPINE BESYLATE 2.5 MG/1
2.5 TABLET ORAL DAILY
Qty: 90 TABLET | Refills: 1 | Status: SHIPPED | OUTPATIENT
Start: 2018-07-18 | End: 2019-03-18

## 2018-07-18 RX ORDER — SIMVASTATIN 20 MG
20 TABLET ORAL DAILY
Qty: 90 TABLET | Refills: 3 | Status: SHIPPED | OUTPATIENT
Start: 2018-07-18 | End: 2019-08-12

## 2018-07-18 RX ORDER — LISINOPRIL 20 MG/1
20 TABLET ORAL DAILY
Qty: 90 TABLET | Refills: 1 | Status: SHIPPED | OUTPATIENT
Start: 2018-07-18 | End: 2019-03-18

## 2018-07-18 ASSESSMENT — PAIN SCALES - GENERAL: PAINLEVEL: NO PAIN (0)

## 2018-07-18 NOTE — MR AVS SNAPSHOT
After Visit Summary   7/18/2018    Claire Moore    MRN: [id number]           Patient Information     Date Of Birth          1940        Visit Information        Provider Department      7/18/2018 9:40 AM Kehr, Kristen M, PA-C [facility]        Today's Diagnoses     Hypertension goal BP (blood pressure) < 140/90        Hyperlipidemia LDL goal <130           Follow-ups after your visit        Your next 10 appointments already scheduled     Dec 27, 2018  9:45 AM CST   Return Visit with Shemar Jesus MD   [facility]    [address] [place] [id number] [phone]              Who to contact     If you have questions or need follow up information about today's clinic visit or your schedule please contact [facility] directly at [phone].  Normal or non-critical lab and imaging results will be communicated to you by MyChart, letter or phone within 4 business days after the clinic has received the results. If you do not hear from us within 7 days, please contact the clinic through MyChart or phone. If you have a critical or abnormal lab result, we will notify you by phone as soon as possible.  Submit refill requests through bluepulse or call your pharmacy and they will forward the refill request to us. Please allow 3 business days for your refill to be completed.          Additional Information About Your Visit        Care EveryWhere ID     This is your Care EveryWhere ID. This could be used by other organizations to access your [place] medical records  [id number]        Your Vitals Were     Pulse Temperature Respirations Pulse Oximetry BMI (Body Mass Index)       83 97.5  F (36.4  C) (Oral) 16 98% 28.35 kg/m2        Blood Pressure from Last 3 Encounters:   07/18/18 142/68   06/21/18 148/86   02/27/18 152/87    Weight from Last 3 Encounters:   07/18/18 155 lb (70.3 kg)   06/21/18 153 lb (69.4 kg)   02/27/18 150 lb  (68 kg)              We Performed the Following     Sodium          Where to get your medicines      These medications were sent to [facility] [address] [facility], [place] - [address]  [address], [facility] [address]     Phone:  [phone]     amLODIPine 2.5 MG tablet    lisinopril 20 MG tablet    simvastatin 20 MG tablet          Primary Care Provider Office Phone # Fax #    Kristen M Kehr, PA-C [phone] [address] [facility] [address]        Equal Access to Services     [facility]: Hadii aad ku hadasho Soomaali, waaxda luqadaha, qaybta kaalmada adeegyada, waxay idiin hayaan adesuhas francisco . So [facility] [phone].    ATENCIÓN: Si habla español, tiene a khan disposición servicios gratuitos de asistencia lingüística. [facility] [phone].    We comply with applicable federal civil rights laws and [place] laws. We do not discriminate on the basis of race, color, national origin, age, disability, sex, sexual orientation, or gender identity.            Thank you!     Thank you for choosing [facility]  for your care. Our goal is always to provide you with excellent care. Hearing back from our patients is one way we can continue to improve our services. Please take a few minutes to complete the written survey that you may receive in the mail after your visit with us. Thank you!             Your Updated Medication List - Protect others around you: Learn how to safely use, store and throw away your medicines at www.disposemymeds.org.          This list is accurate as of 7/18/18 10:23 AM.  Always use your most recent med list.                   Brand Name Dispense Instructions for use Diagnosis    amLODIPine 2.5 MG tablet    NORVASC    90 tablet    Take 1 tablet (2.5 mg) by mouth daily    Hypertension goal BP (blood pressure) < 140/90       aspirin 81 MG tablet      Take  by mouth daily.        CALCIUM 500 PO      1 per day        cholecalciferol 1000 UNIT  tablet    vitamin D3     Take 1 tablet by mouth daily.        levothyroxine 75 MCG tablet    SYNTHROID/LEVOTHROID    90 tablet    TAKE ONE TABLET BY MOUTH ONCE DAILY    Other specified hypothyroidism       lisinopril 20 MG tablet    PRINIVIL/ZESTRIL    90 tablet    Take 1 tablet (20 mg) by mouth daily    Hypertension goal BP (blood pressure) < 140/90       MULTI-DAY VITAMINS PO      Take  by mouth. 1 tab daily        simvastatin 20 MG tablet    ZOCOR    90 tablet    Take 1 tablet (20 mg) by mouth daily    Hyperlipidemia LDL goal <130       * varenicline 0.5 MG X 11 & 1 MG X 42 tablet    CHANTIX STARTING MONTH FILIBETRO    53 tablet    Take 0.5 mg tab daily for 3 days, then 0.5 mg tab twice daily for 4 days, then 1 mg twice daily.    Encounter for smoking cessation counseling       * varenicline 1 MG tablet    CHANTIX    56 tablet    Take 1 tablet (1 mg) by mouth 2 times daily    Encounter for smoking cessation counseling       * Notice:  This list has 2 medication(s) that are the same as other medications prescribed for you. Read the directions carefully, and ask your doctor or other care provider to review them with you.

## 2018-07-18 NOTE — NURSING NOTE
"Chief Complaint   Patient presents with     Hypertension     Health Maintenance     phq2       Initial /74  Pulse 83  Temp 97.5  F (36.4  C) (Oral)  Resp 16  Wt 155 lb (70.3 kg)  SpO2 98%  BMI 28.35 kg/m2 Estimated body mass index is 28.35 kg/(m^2) as calculated from the following:    Height as of 6/21/18: 5' 2\" (1.575 m).    Weight as of this encounter: 155 lb (70.3 kg).  Medication Reconciliation: complete    ARABELLA Shah MA    "

## 2018-07-18 NOTE — LETTER
July 18, 2018    Claire Moore  [address] [facility] [address]            Dear Claire,    Your sodium level is back within normal range. Continue with the amlodipine and lisinopril.     Below is a copy of the results.  It was a pleasure to see you at your last appointment.    If you have any questions or concerns, please call myself or my nurse at [phone].    Sincerely,    Kristen Kehr, PA-C / lalitr    Results for orders placed or performed in visit on 07/18/18   Sodium   Result Value Ref Range    Sodium 136 133 - 144 mmol/L

## 2018-07-18 NOTE — PROGRESS NOTES
Please send a letter with normal results and the following:    Calire,    Your sodium level is back within normal range. Continue with the amlodipine and lisinopril.     If you have any questions or concerns, please contact the clinic at [phone].    Thank you,        Kristen Kehr PA-C

## 2018-12-17 ENCOUNTER — OFFICE VISIT (OUTPATIENT)
Dept: OPHTHALMOLOGY | Facility: CLINIC | Age: 78
End: 2018-12-17
Payer: COMMERCIAL

## 2018-12-17 DIAGNOSIS — H40.051 OCULAR HYPERTENSION OF RIGHT EYE: Primary | ICD-10-CM

## 2018-12-17 DIAGNOSIS — H44.522 BLIND HYPOTENSIVE EYE, LEFT: ICD-10-CM

## 2018-12-17 PROCEDURE — 92012 INTRM OPH EXAM EST PATIENT: CPT | Performed by: OPHTHALMOLOGY

## 2018-12-17 ASSESSMENT — TONOMETRY
IOP_METHOD: APPLANATION
OS_IOP_MMHG: 13
OD_IOP_MMHG: 21

## 2018-12-17 ASSESSMENT — SLIT LAMP EXAM - LIDS
COMMENTS: 1+ DERMATOCHALASIS - UPPER LID
COMMENTS: 1+ DERMATOCHALASIS - UPPER LID

## 2018-12-17 ASSESSMENT — VISUAL ACUITY
METHOD: SNELLEN - LINEAR
OD_CC: 20/30
OD_CC+: -2
CORRECTION_TYPE: GLASSES
OS_CC: NLP

## 2018-12-17 ASSESSMENT — EXTERNAL EXAM - RIGHT EYE: OD_EXAM: 2+ BROW PTOSIS

## 2018-12-17 ASSESSMENT — EXTERNAL EXAM - LEFT EYE: OS_EXAM: 2+ BROW PTOSIS

## 2018-12-17 NOTE — PROGRESS NOTES
Current Eye Medications:  none     Subjective:  6 month IOP check. Vision is doing OK right eye and NLP left eye, unchanged. No eye pain or discomfort in either eye.      Objective:  See Ophthalmology Exam.       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       Plan:  Use artificial tears up to 4 times daily both eyes.  (Refresh Tears, Systane Ultra/Balance, or Theratears)  Return visit in 6 months for complete exam.     Shemar Jesus M.D.  [phone]

## 2018-12-17 NOTE — PATIENT INSTRUCTIONS
Use artificial tears up to 4 times daily both eyes.  (Refresh Tears, Systane Ultra/Balance, or Theratears)  Return visit in 6 months for complete exam.     Shemar Jesus M.D.  [phone]

## 2018-12-17 NOTE — LETTER
12/17/2018         RE: Claire Moore  [address] [facility] [address]        Dear Colleague,    Thank you for referring your patient, Claire Moore, to the [facility]. Please see a copy of my visit note below.     Current Eye Medications:  none     Subjective:  6 month IOP check. Vision is doing OK right eye and NLP left eye, unchanged. No eye pain or discomfort in either eye.      Objective:  See Ophthalmology Exam.       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       Plan:  Use artificial tears up to 4 times daily both eyes.  (Refresh Tears, Systane Ultra/Balance, or Theratears)  Return visit in 6 months for complete exam.     Shemar Jesus M.D.  [phone]             Again, thank you for allowing me to participate in the care of your patient.        Sincerely,        Shemar Jesus MD

## 2018-12-19 PROBLEM — H40.051 OCULAR HYPERTENSION OF RIGHT EYE: Status: ACTIVE | Noted: 2018-12-19

## 2018-12-27 ENCOUNTER — DOCUMENTATION ONLY (OUTPATIENT)
Dept: OPHTHALMOLOGY | Facility: CLINIC | Age: 78
End: 2018-12-27

## 2019-03-18 DIAGNOSIS — I10 HYPERTENSION GOAL BP (BLOOD PRESSURE) < 140/90: ICD-10-CM

## 2019-03-18 NOTE — LETTER
March 19, 2019    Claire Moore  [address]  [place] [address]    Dear Claire,       We recently received a refill request for Medications.  We have refilled this for a one time 30 day supply only because you are due for a:    Hypertension office visit      Please call at your earliest convenience so that there will not be a delay with your future refills.          Thank you,   Your [facility] Team/mkr  [phone]

## 2019-03-19 RX ORDER — AMLODIPINE BESYLATE 2.5 MG/1
TABLET ORAL
Qty: 30 TABLET | Refills: 0 | Status: SHIPPED | OUTPATIENT
Start: 2019-03-19 | End: 2019-04-09 | Stop reason: SINTOL

## 2019-03-19 RX ORDER — LISINOPRIL 20 MG/1
TABLET ORAL
Qty: 30 TABLET | Refills: 0 | Status: SHIPPED | OUTPATIENT
Start: 2019-03-19 | End: 2019-04-09

## 2019-03-19 NOTE — TELEPHONE ENCOUNTER
Prescription approved per [facility] [place]30  APPT NEEDED FOR FURTHER REFILLS  Please help the pt schedule an appointment hypertension.  Health Maintenance Due   Topic Date Due     ZOSTER IMMUNIZATION (1 of 2) 07/07/1988     INFLUENZA VACCINE (1) 09/01/2018     Kelley Mitchell RN

## 2019-04-09 ENCOUNTER — OFFICE VISIT (OUTPATIENT)
Dept: FAMILY MEDICINE | Facility: CLINIC | Age: 79
End: 2019-04-09
Payer: COMMERCIAL

## 2019-04-09 VITALS
TEMPERATURE: 97.8 F | SYSTOLIC BLOOD PRESSURE: 150 MMHG | HEART RATE: 89 BPM | OXYGEN SATURATION: 96 % | DIASTOLIC BLOOD PRESSURE: 76 MMHG

## 2019-04-09 DIAGNOSIS — E78.5 HYPERLIPIDEMIA LDL GOAL <130: ICD-10-CM

## 2019-04-09 DIAGNOSIS — I10 HYPERTENSION GOAL BP (BLOOD PRESSURE) < 140/90: Primary | ICD-10-CM

## 2019-04-09 DIAGNOSIS — E03.8 OTHER SPECIFIED HYPOTHYROIDISM: ICD-10-CM

## 2019-04-09 PROCEDURE — 99213 OFFICE O/P EST LOW 20 MIN: CPT | Performed by: PHYSICIAN ASSISTANT

## 2019-04-09 RX ORDER — METOPROLOL SUCCINATE 50 MG/1
50 TABLET, EXTENDED RELEASE ORAL DAILY
Qty: 90 TABLET | Refills: 1 | Status: SHIPPED | OUTPATIENT
Start: 2019-04-09 | End: 2019-06-07

## 2019-04-09 RX ORDER — LISINOPRIL 20 MG/1
20 TABLET ORAL DAILY
Qty: 90 TABLET | Refills: 1 | Status: SHIPPED | OUTPATIENT
Start: 2019-04-09 | End: 2019-07-16

## 2019-04-09 ASSESSMENT — PAIN SCALES - GENERAL: PAINLEVEL: NO PAIN (0)

## 2019-04-09 NOTE — PROGRESS NOTES
SUBJECTIVE:   Claire Moore is a 78 year old female who presents to clinic today for the following   health issues:    Claire is here for blood pressure recheck.   The amlodipine was added after her last appointment because the hydrochlorothiazide was causing a low sodium level. She is having swelling from the amlodipine.       Hypertension Follow-up      Outpatient blood pressures rare    Low Salt Diet:       Amount of exercise or physical activity:     Problems taking medications regularly: No    Medication side effects: swollen ankles     Diet:           Additional history: as documented    Reviewed  and updated as needed this visit by clinical staff         Reviewed and updated as needed this visit by Provider           ROS:  Constitutional, HEENT, cardiovascular, pulmonary, gi and gu systems are negative, except as otherwise noted.    OBJECTIVE:     /76   Pulse 89   Temp 97.8  F (36.6  C) (Oral)   SpO2 96%   There is no height or weight on file to calculate BMI.  GENERAL: healthy, alert and no distress  RESP: lungs clear to auscultation - no rales, rhonchi or wheezes  CV: regular rate and rhythm, normal S1 S2, no S3 or S4, no murmur, click or rub, no peripheral edema and peripheral pulses strong  MS: no gross musculoskeletal defects noted, no edema    Diagnostic Test Results:  none     ASSESSMENT/PLAN:       1. Hypertension goal BP (blood pressure) < 140/90  Stop the amlodipine due to swelling.   Add toprol XL 50 mg daily.   Continue lisinopril 20 mg daily  Recheck in clinic with nursing in 3 weeks.   She can also stop her daily aspirin due to age and risk of GI bleed.   - metoprolol succinate ER (TOPROL-XL) 50 MG 24 hr tablet; Take 1 tablet (50 mg) by mouth daily  Dispense: 90 tablet; Refill: 1  - lisinopril (PRINIVIL/ZESTRIL) 20 MG tablet; Take 1 tablet (20 mg) by mouth daily  Dispense: 90 tablet; Refill: 1        previsit fasting lab tests to be done with her annual exam in June.   Tests ordered.      Kristen M. Kehr, PA-C  [facility]

## 2019-04-09 NOTE — NURSING NOTE
"Chief Complaint   Patient presents with     Hypertension       Initial /90   Pulse 89   Temp 97.8  F (36.6  C) (Oral)   SpO2 96%  Estimated body mass index is 28.35 kg/m  as calculated from the following:    Height as of 6/21/18: 1.575 m (5' 2\").    Weight as of 7/18/18: 70.3 kg (155 lb).  Medication Reconciliation: complete    PModesta Shah MA    "

## 2019-05-01 ENCOUNTER — ALLIED HEALTH/NURSE VISIT (OUTPATIENT)
Dept: NURSING | Facility: CLINIC | Age: 79
End: 2019-05-01
Payer: COMMERCIAL

## 2019-05-01 VITALS — SYSTOLIC BLOOD PRESSURE: 164 MMHG | HEART RATE: 82 BPM | OXYGEN SATURATION: 95 % | DIASTOLIC BLOOD PRESSURE: 98 MMHG

## 2019-05-01 DIAGNOSIS — I10 HYPERTENSION GOAL BP (BLOOD PRESSURE) < 140/90: Primary | ICD-10-CM

## 2019-05-01 PROCEDURE — 99207 ZZC NO CHARGE LOS: CPT

## 2019-05-01 NOTE — NURSING NOTE
This writer rechecked BP with regular cuff on right arm after patient rested and waited 10 mins after last reading.    BP now:  164/98    Denies headache, dizziness, vision changes, chest pain, shortness of breath, numbness, tingling, nausea, and edema.  Per 4/9/19 office notes, patient was to:    Stop the amlodipine due to swelling.   Add toprol XL 50 mg daily.   Continue lisinopril 20 mg daily    Patient stopped the amlodipine and started the metoprolol but did NOT continue the lisinopril.  She picked up the lisinopril Rx but didn't start it.    This RN huddled with Kristen Kehr, PA and patient is to start taking the lisinopril and recheck BP with ancillary in 3 weeks.   This RN informed patient of this and she understands.      Manjula Muir RN, BSN

## 2019-05-01 NOTE — PROGRESS NOTES
Claire Moore is a 78 year old patient who comes in today for a Blood Pressure check.  Initial BP:  BP (!) 170/100   Pulse 82   SpO2 95%      82  Disposition: BP elevated.  Triage RN notified, patient asked to wait.      BP Readings from Last 3 Encounters:   05/01/19 (!) 170/100   04/09/19 150/76   07/18/18 142/68         Zuly Beasley MA

## 2019-06-06 ENCOUNTER — TELEPHONE (OUTPATIENT)
Dept: FAMILY MEDICINE | Facility: CLINIC | Age: 79
End: 2019-06-06

## 2019-06-06 ENCOUNTER — ALLIED HEALTH/NURSE VISIT (OUTPATIENT)
Dept: NURSING | Facility: CLINIC | Age: 79
End: 2019-06-06
Payer: COMMERCIAL

## 2019-06-06 VITALS — SYSTOLIC BLOOD PRESSURE: 138 MMHG | DIASTOLIC BLOOD PRESSURE: 96 MMHG

## 2019-06-06 DIAGNOSIS — I10 HYPERTENSION GOAL BP (BLOOD PRESSURE) < 140/90: ICD-10-CM

## 2019-06-06 DIAGNOSIS — I10 HYPERTENSION GOAL BP (BLOOD PRESSURE) < 140/90: Primary | ICD-10-CM

## 2019-06-06 PROCEDURE — 99207 ZZC NO CHARGE LOS: CPT

## 2019-06-06 NOTE — NURSING NOTE
Pt here for BP check. Both readings were elevated today. RN was notified and will speak to pt regarding elevated BP.  Cheri Fournier CMA

## 2019-06-06 NOTE — TELEPHONE ENCOUNTER
See 6/6/19 Ancillary BP check appointment.    Upon arrival BP was 174/102, recheck resulted in BP reading of 138/96.  Pt denies: Chest pain, radiating pain, shortness of breath, weakness, painful with exertion, nausea, sweating, accelerated heart rate, palpitations, severe headache.     Please advise if any medication adjustments are recommended based on 138/96 BP recheck, prior to 6/25/19 annual physical exam with PCP.  Pt-preferred pharmacy is [facility] on [address] if medication adjustment prior to 6/25/19 office visit is recommended.    Hiwot Choi, ELIZABETHN, RN

## 2019-06-06 NOTE — NURSING NOTE
Claire Moore is a 78 year old year old patient who comes in today for a Blood Pressure check because of medication dosing misunderstanding. Pt states she has d/c'd lisinopril believing she was only to take metoprolol. Pt states she has now been taking both metoprolol and lisinopril as directed for about a month.  Patient is taking medication as prescribed  Patient is tolerating medications well.  Patient is not monitoring Blood Pressure at home.  Current complaints: mild intermittent ankle edema for a few years  Disposition:  patient instructed to monitor for any new or worsening sx and call to speak with RN if sx, patient to continue with the same medication, patient reminded to call as needed and RN will route an encounter to provider to review and advise regarding pt's elevated BP readings today. Next office visit with PCP is on 6/25/19.    Warning symptoms that would need to be seen promptly at Emergency Room or require calling 911 include:  Chest pain, radiating pain, shortness of breath, painful breathing with exertion, nausea, sweating, accelerated heart rate, palpitations, worst headache of your life.     Pt will go home and await callback from clinic if provider wishes to adjust medication or other intervention r/t elevated BP prior to 6/25/19 office visit. Pt instructed to call to speak to RN if she develops any new sx.    Telephone encounter created and routed to provider to review and advise.    ELIZABETH ForemanN, RN

## 2019-06-07 RX ORDER — METOPROLOL SUCCINATE 50 MG/1
100 TABLET, EXTENDED RELEASE ORAL DAILY
Qty: 90 TABLET | Refills: 1 | COMMUNITY
Start: 2019-06-07 | End: 2019-08-28

## 2019-06-07 NOTE — TELEPHONE ENCOUNTER
Left message on answering machine for patient/parent to call back.   [phone].  Kelley Mitchell RN

## 2019-06-07 NOTE — TELEPHONE ENCOUNTER
Please have her increase the toprol to 100 mg. She can take 2 of the pills she currently has.   Recheck in 3 weeks. (I have adjusted her medication list to reflect the change)  Kristen Kehr PA-C

## 2019-06-07 NOTE — TELEPHONE ENCOUNTER
Patient/parent is informed of MD note below, as it is written. Verbalized good understanding.  Patient already has appointment with Kristen Kehr, PA-C scheduled.     Next 5 appointments (look out 90 days)    Jun 25, 2019  9:40 AM CDT  PHYSICAL with Kristen M Kehr, PA-C  [facility] [address] [facility] [address]  [phone]         Kelley Mitchell RN

## 2019-06-18 DIAGNOSIS — E78.5 HYPERLIPIDEMIA LDL GOAL <130: ICD-10-CM

## 2019-06-18 DIAGNOSIS — E03.8 OTHER SPECIFIED HYPOTHYROIDISM: ICD-10-CM

## 2019-06-18 DIAGNOSIS — I10 HYPERTENSION GOAL BP (BLOOD PRESSURE) < 140/90: ICD-10-CM

## 2019-06-18 LAB
ANION GAP SERPL CALCULATED.3IONS-SCNC: 8 MMOL/L (ref 3–14)
BUN SERPL-MCNC: 17 MG/DL (ref 7–30)
CALCIUM SERPL-MCNC: 10 MG/DL (ref 8.5–10.1)
CHLORIDE SERPL-SCNC: 103 MMOL/L (ref 94–109)
CHOLEST SERPL-MCNC: 159 MG/DL
CO2 SERPL-SCNC: 26 MMOL/L (ref 20–32)
CREAT SERPL-MCNC: 0.79 MG/DL (ref 0.52–1.04)
GFR SERPL CREATININE-BSD FRML MDRD: 70 ML/MIN/{1.73_M2}
GLUCOSE SERPL-MCNC: 88 MG/DL (ref 70–99)
HDLC SERPL-MCNC: 66 MG/DL
LDLC SERPL CALC-MCNC: 62 MG/DL
NONHDLC SERPL-MCNC: 93 MG/DL
POTASSIUM SERPL-SCNC: 4.3 MMOL/L (ref 3.4–5.3)
SODIUM SERPL-SCNC: 137 MMOL/L (ref 133–144)
TRIGL SERPL-MCNC: 154 MG/DL
TSH SERPL DL<=0.005 MIU/L-ACNC: 2.77 MU/L (ref 0.4–4)

## 2019-06-18 PROCEDURE — 36415 COLL VENOUS BLD VENIPUNCTURE: CPT | Performed by: PHYSICIAN ASSISTANT

## 2019-06-18 PROCEDURE — 84443 ASSAY THYROID STIM HORMONE: CPT | Performed by: PHYSICIAN ASSISTANT

## 2019-06-18 PROCEDURE — 80061 LIPID PANEL: CPT | Performed by: PHYSICIAN ASSISTANT

## 2019-06-18 PROCEDURE — 80048 BASIC METABOLIC PNL TOTAL CA: CPT | Performed by: PHYSICIAN ASSISTANT

## 2019-06-18 NOTE — LETTER
June 20, 2019    Claire Moore  [address]  [place] [address]            Dear Claire,    The results of your recent tests were normal.  Below is a copy of the results.  It was a pleasure to see you at your last appointment.    If you have any questions or concerns, please call myself or my nurse at [phone].    Sincerely,    Kristen Kehr, PA-C / lalitr    Results for orders placed or performed in visit on 06/18/19   **Basic metabolic panel FUTURE anytime   Result Value Ref Range    Sodium 137 133 - 144 mmol/L    Potassium 4.3 3.4 - 5.3 mmol/L    Chloride 103 94 - 109 mmol/L    Carbon Dioxide 26 20 - 32 mmol/L    Anion Gap 8 3 - 14 mmol/L    Glucose 88 70 - 99 mg/dL    Urea Nitrogen 17 7 - 30 mg/dL    Creatinine 0.79 0.52 - 1.04 mg/dL    GFR Estimate 70 >60 mL/min/[1.73_m2]    GFR Estimate If Black 81 >60 mL/min/[1.73_m2]    Calcium 10.0 8.5 - 10.1 mg/dL   Lipid panel reflex to direct LDL Fasting   Result Value Ref Range    Cholesterol 159 <200 mg/dL    Triglycerides 154 (H) <150 mg/dL    HDL Cholesterol 66 >49 mg/dL    LDL Cholesterol Calculated 62 <100 mg/dL    Non HDL Cholesterol 93 <130 mg/dL   **TSH with free T4 reflex FUTURE anytime   Result Value Ref Range    TSH 2.77 0.40 - 4.00 mU/L

## 2019-06-25 ENCOUNTER — OFFICE VISIT (OUTPATIENT)
Dept: FAMILY MEDICINE | Facility: CLINIC | Age: 79
End: 2019-06-25
Payer: COMMERCIAL

## 2019-06-25 VITALS
HEART RATE: 73 BPM | HEIGHT: 62 IN | SYSTOLIC BLOOD PRESSURE: 160 MMHG | WEIGHT: 162 LBS | DIASTOLIC BLOOD PRESSURE: 86 MMHG | TEMPERATURE: 98.4 F | BODY MASS INDEX: 29.81 KG/M2 | OXYGEN SATURATION: 95 %

## 2019-06-25 DIAGNOSIS — E03.8 OTHER SPECIFIED HYPOTHYROIDISM: ICD-10-CM

## 2019-06-25 DIAGNOSIS — Z00.00 ENCOUNTER FOR MEDICARE ANNUAL WELLNESS EXAM: Primary | ICD-10-CM

## 2019-06-25 DIAGNOSIS — I10 HYPERTENSION GOAL BP (BLOOD PRESSURE) < 140/90: ICD-10-CM

## 2019-06-25 PROCEDURE — G0439 PPPS, SUBSEQ VISIT: HCPCS | Performed by: PHYSICIAN ASSISTANT

## 2019-06-25 PROCEDURE — 99213 OFFICE O/P EST LOW 20 MIN: CPT | Mod: 25 | Performed by: PHYSICIAN ASSISTANT

## 2019-06-25 RX ORDER — FUROSEMIDE 20 MG
20 TABLET ORAL DAILY
Qty: 30 TABLET | Refills: 1 | Status: SHIPPED | OUTPATIENT
Start: 2019-06-25 | End: 2019-07-16

## 2019-06-25 ASSESSMENT — PAIN SCALES - GENERAL: PAINLEVEL: NO PAIN (0)

## 2019-06-25 ASSESSMENT — MIFFLIN-ST. JEOR: SCORE: 1162.05

## 2019-06-25 NOTE — PROGRESS NOTES
"la  SUBJECTIVE:   Claire Moore is a 78 year old female who presents for Preventive Visit.      Are you in the first 12 months of your Medicare Part B coverage?  No    Physical Health:    In general, how would you rate your overall physical health? good    Outside of work, how many days during the week do you exercise? active    Outside of work, approximately how many minutes a day do you exercise?not applicable    If you drink alcohol do you typically have >3 drinks per day or >7 drinks per week? No    Do you usually eat at least 4 servings of fruit and vegetables a day, include whole grains & fiber and avoid regularly eating high fat or \"junk\" foods? Yes    Do you have any problems taking medications regularly?  No    Do you have any side effects from medications? none    Needs assistance for the following daily activities: no assistance needed    Which of the following safety concerns are present in your home?  none identified     Hearing impairment: No    In the past 6 months, have you been bothered by leaking of urine? varies    Mental Health:    In general, how would you rate your overall mental or emotional health? good  PHQ-2 Score:      Do you feel safe in your environment? Yes    Do you have a Health Care Directive? No: Advance care planning was reviewed with patient; patient declined at this time.    Additional concerns to address?      Fall risk:  Fallen 2 or more times in the past year?: No  Any fall with injury in the past year?: No    Cognitive Screenin) Repeat 3 items (Leader, Season, Table)    2) Clock draw: NORMAL  3) 3 item recall: Recalls 3 objects  Results: 3 items recalled: COGNITIVE IMPAIRMENT LESS LIKELY    Mini-CogTM Copyright LUIS Ya. Licensed by the author for use in [facility]; reprinted with permission (tr@.[facility]). All rights reserved.      Do you have sleep apnea, excessive snoring or daytime drowsiness?: no        Hypertension Follow-up      Do you check your " blood pressure regularly outside of the clinic? No     Are you following a low salt diet? No    Are your blood pressures ever more than 140 on the top number (systolic) OR more   than 90 on the bottom number (diastolic), for example 140/90? No    Reviewed and updated as needed this visit by clinical staff  Tobacco  Allergies  Meds  Med Hx  Surg Hx  Fam Hx  Soc Hx        Reviewed and updated as needed this visit by Provider        Social History     Tobacco Use     Smoking status: Former Smoker     Types: Cigarettes     Last attempt to quit: 2011     Years since quittin.4     Smokeless tobacco: Never Used   Substance Use Topics     Alcohol use: Yes     Comment: occ                           Current providers sharing in care for this patient include:   Patient Care Team:  Kehr, Kristen M, PA-C as PCP - General  Kehr, Kristen M, PA-C as Assigned PCP    The following health maintenance items are reviewed in Epic and correct as of today:  Health Maintenance   Topic Date Due     ZOSTER IMMUNIZATION (1 of 2) 1988     PHQ-2  2019     FALL RISK ASSESSMENT  2019     MEDICARE ANNUAL WELLNESS VISIT  2019     EYE EXAM  2019     INFLUENZA VACCINE (Season Ended) 2019     LIPID  2020     ADVANCE CARE PLANNING  2022     DTAP/TDAP/TD IMMUNIZATION (3 - Td) 2026     DEXA  Completed     PNEUMOCOCCAL IMMUNIZATION 65+ LOW/MEDIUM RISK  Completed     IPV IMMUNIZATION  Aged Out     MENINGITIS IMMUNIZATION  Aged Out     BP Readings from Last 3 Encounters:   19 160/86   19 (!) 138/96   19 (!) 164/98    Wt Readings from Last 3 Encounters:   19 73.5 kg (162 lb)   18 70.3 kg (155 lb)   18 69.4 kg (153 lb)                  Patient Active Problem List   Diagnosis     Hypertension goal BP (blood pressure) < 140/90     Hyperlipidemia LDL goal <130     Advanced directives, counseling/discussion     DJD (degenerative joint disease)     Pseudophakia,  od     Blind hypotensive eye, left     History of retinal detachment, od     Posterior vitreous detachment, right     Other specified hypothyroidism     Appendicitis     S/P laparoscopic appendectomy     Ocular hypertension of right eye     Past Surgical History:   Procedure Laterality Date     CATARACT IOL, RT/LT       COLPORRHAPHY ANTERIOR N/A 3/30/2017    Procedure: COLPORRHAPHY ANTERIOR;  Surgeon: Rene Mitchell MD;  Location: [facility] OR     CYSTOSCOPY, SLING TRANSVAGINAL N/A 3/30/2017    Procedure: CYSTOSCOPY, SLING TRANSVAGINAL;  Surgeon: Rene Mitchell MD;  Location: [facility] OR     D & C       LAPAROSCOPIC APPENDECTOMY N/A 2018    Procedure: LAPAROSCOPIC APPENDECTOMY;  Laparoscopic appendectomy;  Surgeon: Glenn Pacheco MD;  Location: [facility]     PHACOEMULSIFICATION WITH STANDARD INTRAOCULAR LENS IMPLANT  2015    right eye     RETINAL REATTACHMENT  remotely    right eye     TONSILLECTOMY       TUBAL LIGATION         Social History     Tobacco Use     Smoking status: Former Smoker     Types: Cigarettes     Last attempt to quit: 2011     Years since quittin.4     Smokeless tobacco: Never Used   Substance Use Topics     Alcohol use: Yes     Comment: occ     Family History   Problem Relation Age of Onset     Alzheimer Disease Mother      Osteoporosis Mother      Hypertension Mother      Prostate Cancer Father      Osteoporosis Maternal Grandmother      Cancer Brother      Eye Disorder Brother      Eye Disorder Sister      Eye Disorder Son      Eye Disorder Brother      Eye Disorder Brother      Cerebrovascular Disease Paternal Grandmother      Diabetes No family hx of      Thyroid Disease No family hx of      Glaucoma No family hx of      Macular Degeneration No family hx of          Current Outpatient Medications   Medication Sig Dispense Refill     aspirin 81 MG tablet Take  by mouth daily.       CALCIUM 500 OR 1 per day        cholecalciferol (VITAMIN D) 1000 UNIT tablet Take 1 tablet by  "mouth daily.       furosemide (LASIX) 20 MG tablet Take 1 tablet (20 mg) by mouth daily 30 tablet 1     lisinopril (PRINIVIL/ZESTRIL) 20 MG tablet Take 1 tablet (20 mg) by mouth daily 90 tablet 1     metoprolol succinate ER (TOPROL-XL) 50 MG 24 hr tablet Take 2 tablets (100 mg) by mouth daily 90 tablet 1     Multiple Vitamin (MULTI-DAY VITAMINS PO) Take  by mouth. 1 tab daily       simvastatin (ZOCOR) 20 MG tablet Take 1 tablet (20 mg) by mouth daily 90 tablet 3     levothyroxine (SYNTHROID/LEVOTHROID) 75 MCG tablet TAKE 1 TABLET BY MOUTH ONCE DAILY 90 tablet 3     Allergies   Allergen Reactions     Amlodipine Swelling     Side effect / swelling     Hctz [Hydrochlorothiazide]      Low sodium       Recent Labs   Lab Test 06/18/19  0848 06/18/18  0856  06/06/17  0817  05/24/13  0752   LDL 62 71  --  72   < > 64   HDL 66 76  --  75   < > 55   TRIG 154* 87  --  121   < > 96   ALT  --   --   --   --   --  32   CR 0.79 0.72   < > 0.79   < > 0.77   GFRESTIMATED 70 77   < > 70   < > 73   GFRESTBLACK 81 >90   < > 85   < > 89   POTASSIUM 4.3 3.8   < > 4.1   < > 3.7   TSH 2.77 3.70  --  1.43   < > 2.32    < > = values in this interval not displayed.        ROS:  Constitutional, HEENT, cardiovascular, pulmonary, GI, , musculoskeletal, neuro, skin, endocrine and psych systems are negative, except as otherwise noted.    OBJECTIVE:   /86   Pulse 73   Temp 98.4  F (36.9  C) (Oral)   Ht 1.581 m (5' 2.25\")   Wt 73.5 kg (162 lb)   SpO2 95%   BMI 29.39 kg/m   Estimated body mass index is 29.39 kg/m  as calculated from the following:    Height as of this encounter: 1.581 m (5' 2.25\").    Weight as of this encounter: 73.5 kg (162 lb).  EXAM:   GENERAL: healthy, alert and no distress  NECK: no adenopathy, no asymmetry, masses, or scars and thyroid normal to palpation  RESP: lungs clear to auscultation - no rales, rhonchi or wheezes  CV: regular rate and rhythm, normal S1 S2, no S3 or S4, no murmur, click or rub, no " peripheral edema and peripheral pulses strong  ABDOMEN: soft, nontender, no hepatosplenomegaly, no masses and bowel sounds normal  MS: no gross musculoskeletal defects noted, no edema  PSYCH: mentation appears normal, affect normal/bright    Diagnostic Test Results:  .  Results for orders placed or performed in visit on 06/18/19   **Basic metabolic panel FUTURE anytime   Result Value Ref Range    Sodium 137 133 - 144 mmol/L    Potassium 4.3 3.4 - 5.3 mmol/L    Chloride 103 94 - 109 mmol/L    Carbon Dioxide 26 20 - 32 mmol/L    Anion Gap 8 3 - 14 mmol/L    Glucose 88 70 - 99 mg/dL    Urea Nitrogen 17 7 - 30 mg/dL    Creatinine 0.79 0.52 - 1.04 mg/dL    GFR Estimate 70 >60 mL/min/[1.73_m2]    GFR Estimate If Black 81 >60 mL/min/[1.73_m2]    Calcium 10.0 8.5 - 10.1 mg/dL   Lipid panel reflex to direct LDL Fasting   Result Value Ref Range    Cholesterol 159 <200 mg/dL    Triglycerides 154 (H) <150 mg/dL    HDL Cholesterol 66 >49 mg/dL    LDL Cholesterol Calculated 62 <100 mg/dL    Non HDL Cholesterol 93 <130 mg/dL   **TSH with free T4 reflex FUTURE anytime   Result Value Ref Range    TSH 2.77 0.40 - 4.00 mU/L         ASSESSMENT / PLAN:   1. Encounter for Medicare annual wellness exam  Health maintenance reviewed and updated    2. Hypertension goal BP (blood pressure) < 140/90  Needs better control.   Trial of lasix. Side effects and how to take the medication discussed.  Recheck  In 3 weeks with lab tests. Low sodium has occurred with hydrochlorothiazide, monitor closely  - furosemide (LASIX) 20 MG tablet; Take 1 tablet (20 mg) by mouth daily  Dispense: 30 tablet; Refill: 1  - **Basic metabolic panel FUTURE anytime; Future    3. Other specified hypothyroidism  Stable, refills had already been given.       End of Life Planning:  Patient currently has an advanced directive: Yes.  Practitioner is supportive of decision.    COUNSELING:  Reviewed preventive health counseling, as reflected in patient instructions        "Regular exercise       Healthy diet/nutrition       Consider lung cancer screening for ages 55-80 years and 30 pack-year smoking history     Estimated body mass index is 29.39 kg/m  as calculated from the following:    Height as of this encounter: 1.581 m (5' 2.25\").    Weight as of this encounter: 73.5 kg (162 lb).    Weight management plan: Discussed healthy diet and exercise guidelines     reports that she quit smoking about 8 years ago. Her smoking use included cigarettes. She has never used smokeless tobacco.      Appropriate preventive services were discussed with this patient, including applicable screening as appropriate for cardiovascular disease, diabetes, osteopenia/osteoporosis, and glaucoma.  As appropriate for age/gender, discussed screening for colorectal cancer, prostate cancer, breast cancer, and cervical cancer. Checklist reviewing preventive services available has been given to the patient.    Reviewed patients plan of care and provided an AVS. The Basic Care Plan (routine screening as documented in Health Maintenance) for Claire meets the Care Plan requirement. This Care Plan has been established and reviewed with the Patient.    Counseling Resources:  ATP IV Guidelines  Pooled Cohorts Equation Calculator  Breast Cancer Risk Calculator  FRAX Risk Assessment  ICSI Preventive Guidelines  Dietary Guidelines for Americans, 2010  USDA's MyPlate  ASA Prophylaxis  Lung CA Screening    Kristen M. Kehr, PA-C  [facility]  "

## 2019-06-25 NOTE — TELEPHONE ENCOUNTER
Patient had apt with provider 6/25/19. Labs done on 6/18/19. To provider to approve.  Gracie Henning RN

## 2019-06-25 NOTE — PATIENT INSTRUCTIONS
Patient Education   Personalized Prevention Plan  You are due for the preventive services outlined below.  Your care team is available to assist you in scheduling these services.  If you have already completed any of these items, please share that information with your care team to update in your medical record.  Health Maintenance Due   Topic Date Due     Zoster (Shingles) Vaccine (1 of 2) 07/07/1988     PHQ-2  01/01/2019     FALL RISK ASSESSMENT  06/21/2019     Annual Wellness Visit  06/21/2019     Eye Exam  06/27/2019

## 2019-06-25 NOTE — NURSING NOTE
"Chief Complaint   Patient presents with     Wellness Visit       Initial BP (!) 226/103   Pulse 73   Temp 98.4  F (36.9  C) (Oral)   Ht 1.581 m (5' 2.25\")   Wt 73.5 kg (162 lb)   SpO2 95%   BMI 29.39 kg/m   Estimated body mass index is 29.39 kg/m  as calculated from the following:    Height as of this encounter: 1.581 m (5' 2.25\").    Weight as of this encounter: 73.5 kg (162 lb).  Medication Reconciliation: complete    ARABELLA Shah MA    "

## 2019-06-26 RX ORDER — LEVOTHYROXINE SODIUM 75 UG/1
TABLET ORAL
Qty: 90 TABLET | Refills: 3 | Status: SHIPPED | OUTPATIENT
Start: 2019-06-26 | End: 2020-06-08

## 2019-07-01 ENCOUNTER — OFFICE VISIT (OUTPATIENT)
Dept: OPHTHALMOLOGY | Facility: CLINIC | Age: 79
End: 2019-07-01
Payer: COMMERCIAL

## 2019-07-01 ENCOUNTER — APPOINTMENT (OUTPATIENT)
Dept: OPTOMETRY | Facility: CLINIC | Age: 79
End: 2019-07-01
Payer: COMMERCIAL

## 2019-07-01 DIAGNOSIS — H52.4 PRESBYOPIA: ICD-10-CM

## 2019-07-01 DIAGNOSIS — H40.051 OCULAR HYPERTENSION OF RIGHT EYE: ICD-10-CM

## 2019-07-01 DIAGNOSIS — Z01.01 ENCOUNTER FOR EXAMINATION OF EYES AND VISION WITH ABNORMAL FINDINGS: Primary | ICD-10-CM

## 2019-07-01 DIAGNOSIS — H43.811 POSTERIOR VITREOUS DETACHMENT, RIGHT: ICD-10-CM

## 2019-07-01 DIAGNOSIS — Z96.1 PSEUDOPHAKIA: ICD-10-CM

## 2019-07-01 DIAGNOSIS — Z86.69 HISTORY OF RETINAL DETACHMENT: ICD-10-CM

## 2019-07-01 DIAGNOSIS — H44.522 BLIND HYPOTENSIVE EYE, LEFT: ICD-10-CM

## 2019-07-01 PROCEDURE — V2781 PROGRESSIVE LENS PER LENS: HCPCS | Mod: RT | Performed by: OPHTHALMOLOGY

## 2019-07-01 PROCEDURE — 92014 COMPRE OPH EXAM EST PT 1/>: CPT | Performed by: OPHTHALMOLOGY

## 2019-07-01 PROCEDURE — 92015 DETERMINE REFRACTIVE STATE: CPT | Performed by: OPHTHALMOLOGY

## 2019-07-01 ASSESSMENT — REFRACTION_WEARINGRX
OD_CYLINDER: SPHERE
OD_ADD: +3.00
OS_ADD: +3.00
OS_SPHERE: -0.75
OD_SPHERE: -0.75
OS_CYLINDER: SPHERE
SPECS_TYPE: PAL

## 2019-07-01 ASSESSMENT — CONF VISUAL FIELD
OS_INFERIOR_TEMPORAL_RESTRICTION: 1
OD_NORMAL: 1
OS_SUPERIOR_TEMPORAL_RESTRICTION: 1
OS_SUPERIOR_NASAL_RESTRICTION: 1
OS_INFERIOR_NASAL_RESTRICTION: 1

## 2019-07-01 ASSESSMENT — REFRACTION_MANIFEST
OD_SPHERE: PLANO
OD_CYLINDER: SPHERE
OD_ADD: +3.00

## 2019-07-01 ASSESSMENT — VISUAL ACUITY
OS_CC: NLP
OD_CC+: -2
OD_CC: 20/40
OD_PH_CC: 20/40
OD_CC: 4
METHOD: SNELLEN - LINEAR
CORRECTION_TYPE: GLASSES

## 2019-07-01 ASSESSMENT — SLIT LAMP EXAM - LIDS
COMMENTS: 1+ DERMATOCHALASIS - UPPER LID
COMMENTS: 1+ DERMATOCHALASIS - UPPER LID

## 2019-07-01 ASSESSMENT — TONOMETRY
OD_IOP_MMHG: 22
IOP_METHOD: APPLANATION
OS_IOP_MMHG: 12

## 2019-07-01 ASSESSMENT — EXTERNAL EXAM - LEFT EYE: OS_EXAM: 2+ BROW PTOSIS

## 2019-07-01 ASSESSMENT — CUP TO DISC RATIO: OD_RATIO: 0.2

## 2019-07-01 ASSESSMENT — EXTERNAL EXAM - RIGHT EYE: OD_EXAM: 2+ BROW PTOSIS

## 2019-07-01 NOTE — LETTER
7/1/2019         RE: Claire Moore  [address]  [place] [address]        Dear Colleague,    Thank you for referring your patient, Claire Moore, to the [facility]. Please see a copy of my visit note below.     Current Eye Medications:  Artificial tears both eyes as needed.       Subjective:  Comprehensive Eye Exam.  No vision changes or concerns - glasses are working well.       Objective:  See Ophthalmology Exam.       Assessment:  Stable eye exam in patient who is monocular.      ICD-10-CM    1. Encounter for examination of eyes and vision with abnormal findings Z01.01 REFRACTIVE STATUS   2. Presbyopia H52.4 REFRACTIVE STATUS   3. Pseudophakia, od Z96.1 EYE EXAM (SIMPLE-NONBILLABLE)   4. 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 Ocular hypertension of right eye H40.051    6. History of retinal detachment, od Z86.69    7. Posterior vitreous detachment, right H43.811         Plan:  Glasses Rx given - optional  Possible clouding of posterior capsule right eye  discussed.  Continue observation with regard to glaucoma suspect status.  Return visit 6 months for an intraocular pressure check, glaucoma OCT, retinal OCT, and Esteban Visual Field.  Shemar Jesus M.D.  [phone]           Again, thank you for allowing me to participate in the care of your patient.        Sincerely,        Shemar Jesus MD

## 2019-07-01 NOTE — PATIENT INSTRUCTIONS
Glasses Rx given - optional  Possible clouding of posterior capsule left eye discussed.  Continue observation with regard to glaucoma suspect status.  Return visit 6 months for an intraocular pressure check, glaucoma OCT, retinal OCT, and Esteban Visual Field.  Shemar Jesus M.D.  [phone]

## 2019-07-01 NOTE — PROGRESS NOTES
Current Eye Medications:  Artificial tears both eyes as needed.       Subjective:  Comprehensive Eye Exam.  No vision changes or concerns - glasses are working well.       Objective:  See Ophthalmology Exam.       Assessment:  Stable eye exam in patient who is monocular.      ICD-10-CM    1. Encounter for examination of eyes and vision with abnormal findings Z01.01 REFRACTIVE STATUS   2. Presbyopia H52.4 REFRACTIVE STATUS   3. Pseudophakia, od Z96.1 EYE EXAM (SIMPLE-NONBILLABLE)   4. 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 Ocular hypertension of right eye H40.051    6. History of retinal detachment, od Z86.69    7. Posterior vitreous detachment, right H43.811         Plan:  Glasses Rx given - optional  Possible clouding of posterior capsule right eye  discussed.  Continue observation with regard to glaucoma suspect status.  Return visit 6 months for an intraocular pressure check, glaucoma OCT, retinal OCT, and Esteban Visual Field.  Shemar Jesus M.D.  [phone]

## 2019-07-11 DIAGNOSIS — I10 HYPERTENSION GOAL BP (BLOOD PRESSURE) < 140/90: ICD-10-CM

## 2019-07-11 LAB
ANION GAP SERPL CALCULATED.3IONS-SCNC: 8 MMOL/L (ref 3–14)
BUN SERPL-MCNC: 26 MG/DL (ref 7–30)
CALCIUM SERPL-MCNC: 9.8 MG/DL (ref 8.5–10.1)
CHLORIDE SERPL-SCNC: 102 MMOL/L (ref 94–109)
CO2 SERPL-SCNC: 26 MMOL/L (ref 20–32)
CREAT SERPL-MCNC: 0.88 MG/DL (ref 0.52–1.04)
GFR SERPL CREATININE-BSD FRML MDRD: 62 ML/MIN/{1.73_M2}
GLUCOSE SERPL-MCNC: 107 MG/DL (ref 70–99)
POTASSIUM SERPL-SCNC: 5.1 MMOL/L (ref 3.4–5.3)
SODIUM SERPL-SCNC: 136 MMOL/L (ref 133–144)

## 2019-07-11 PROCEDURE — 80048 BASIC METABOLIC PNL TOTAL CA: CPT | Performed by: PHYSICIAN ASSISTANT

## 2019-07-11 PROCEDURE — 36415 COLL VENOUS BLD VENIPUNCTURE: CPT | Performed by: PHYSICIAN ASSISTANT

## 2019-07-11 NOTE — LETTER
July 11, 2019    Claire Moore  [address]  [place] [address]     Dear ,    We are writing to inform you of your test results.    Resulted Orders   **Basic metabolic panel FUTURE anytime   Result Value Ref Range    Sodium 136 133 - 144 mmol/L    Potassium 5.1 3.4 - 5.3 mmol/L    Chloride 102 94 - 109 mmol/L    Carbon Dioxide 26 20 - 32 mmol/L    Anion Gap 8 3 - 14 mmol/L    Glucose 107 (H) 70 - 99 mg/dL      Comment:      Non Fasting    Urea Nitrogen 26 7 - 30 mg/dL    Creatinine 0.88 0.52 - 1.04 mg/dL    GFR Estimate 62 >60 mL/min/[1.73_m2]      Comment:      Non  GFR Calc  Starting 12/18/2018, serum creatinine based estimated GFR (eGFR) will be   calculated using the Chronic Kidney Disease Epidemiology Collaboration   (CKD-EPI) equation.      GFR Estimate If Black 72 >60 mL/min/[1.73_m2]      Comment:       GFR Calc  Starting 12/18/2018, serum creatinine based estimated GFR (eGFR) will be   calculated using the Chronic Kidney Disease Epidemiology Collaboration   (CKD-EPI) equation.      Calcium 9.8 8.5 - 10.1 mg/dL     If you have any questions or concerns, please call the clinic at the number listed above.     Sincerely,    [facility]

## 2019-07-16 ENCOUNTER — OFFICE VISIT (OUTPATIENT)
Dept: FAMILY MEDICINE | Facility: CLINIC | Age: 79
End: 2019-07-16
Payer: COMMERCIAL

## 2019-07-16 VITALS
WEIGHT: 163 LBS | HEART RATE: 72 BPM | SYSTOLIC BLOOD PRESSURE: 154 MMHG | OXYGEN SATURATION: 96 % | BODY MASS INDEX: 29.57 KG/M2 | TEMPERATURE: 98.5 F | DIASTOLIC BLOOD PRESSURE: 82 MMHG

## 2019-07-16 DIAGNOSIS — I10 HYPERTENSION GOAL BP (BLOOD PRESSURE) < 140/90: ICD-10-CM

## 2019-07-16 PROCEDURE — 99213 OFFICE O/P EST LOW 20 MIN: CPT | Performed by: PHYSICIAN ASSISTANT

## 2019-07-16 RX ORDER — LISINOPRIL 20 MG/1
20 TABLET ORAL 2 TIMES DAILY
Qty: 180 TABLET | Refills: 1 | Status: SHIPPED | OUTPATIENT
Start: 2019-07-16 | End: 2020-02-04

## 2019-07-16 RX ORDER — FUROSEMIDE 20 MG
20 TABLET ORAL DAILY
Qty: 90 TABLET | Refills: 1 | Status: SHIPPED | OUTPATIENT
Start: 2019-07-16 | End: 2020-02-05

## 2019-07-16 ASSESSMENT — PAIN SCALES - GENERAL: PAINLEVEL: NO PAIN (0)

## 2019-07-16 NOTE — NURSING NOTE
"Chief Complaint   Patient presents with     Results     lab       Initial /90   Pulse 72   Temp 98.5  F (36.9  C) (Tympanic)   Wt 73.9 kg (163 lb)   SpO2 96%   BMI 29.57 kg/m   Estimated body mass index is 29.57 kg/m  as calculated from the following:    Height as of 6/25/19: 1.581 m (5' 2.25\").    Weight as of this encounter: 73.9 kg (163 lb).  Medication Reconciliation: complete    ARABELLA Shah MA    "

## 2019-07-16 NOTE — PROGRESS NOTES
Subjective     Claire Moore is a 79 year old female who presents to clinic today for the following health issues:    HPI     Follow up labs.   The lasix was added for hypertension after her last appointment.   She has been taking 20 mg daily and doing well. Her blood pressure is better on her readings at home, but still above goal.   She had a BMP done because her sodium was low with hydrochlorothiazide in the past.   Results for orders placed or performed in visit on 07/11/19   **Basic metabolic panel FUTURE anytime   Result Value Ref Range    Sodium 136 133 - 144 mmol/L    Potassium 5.1 3.4 - 5.3 mmol/L    Chloride 102 94 - 109 mmol/L    Carbon Dioxide 26 20 - 32 mmol/L    Anion Gap 8 3 - 14 mmol/L    Glucose 107 (H) 70 - 99 mg/dL    Urea Nitrogen 26 7 - 30 mg/dL    Creatinine 0.88 0.52 - 1.04 mg/dL    GFR Estimate 62 >60 mL/min/[1.73_m2]    GFR Estimate If Black 72 >60 mL/min/[1.73_m2]    Calcium 9.8 8.5 - 10.1 mg/dL           Patient Active Problem List   Diagnosis     Hypertension goal BP (blood pressure) < 140/90     Hyperlipidemia LDL goal <130     Advanced directives, counseling/discussion     DJD (degenerative joint disease)     Pseudophakia, od     Blind hypotensive eye, left     History of retinal detachment, od     Posterior vitreous detachment, right     Other specified hypothyroidism     Appendicitis     S/P laparoscopic appendectomy     Ocular hypertension of right eye     Past Surgical History:   Procedure Laterality Date     CATARACT IOL, RT/LT       COLPORRHAPHY ANTERIOR N/A 3/30/2017    Procedure: COLPORRHAPHY ANTERIOR;  Surgeon: Rene Mitchell MD;  Location:  OR     CYSTOSCOPY, SLING TRANSVAGINAL N/A 3/30/2017    Procedure: CYSTOSCOPY, SLING TRANSVAGINAL;  Surgeon: Rene Mitchell MD;  Location:  OR     D & C       LAPAROSCOPIC APPENDECTOMY N/A 2/8/2018    Procedure: LAPAROSCOPIC APPENDECTOMY;  Laparoscopic appendectomy;  Surgeon: Glenn Pacheco MD;  Location: [facility]      PHACOEMULSIFICATION WITH STANDARD INTRAOCULAR LENS IMPLANT  2015    right eye     RETINAL REATTACHMENT  remotely    right eye     TONSILLECTOMY       TUBAL LIGATION         Social History     Tobacco Use     Smoking status: Former Smoker     Types: Cigarettes     Last attempt to quit: 2011     Years since quittin.4     Smokeless tobacco: Never Used   Substance Use Topics     Alcohol use: Yes     Comment: occ     Family History   Problem Relation Age of Onset     Alzheimer Disease Mother      Osteoporosis Mother      Hypertension Mother      Prostate Cancer Father      Osteoporosis Maternal Grandmother      Cancer Brother      Eye Disorder Brother      Eye Disorder Sister      Eye Disorder Son      Eye Disorder Brother      Eye Disorder Brother      Cerebrovascular Disease Paternal Grandmother      Diabetes No family hx of      Thyroid Disease No family hx of      Glaucoma No family hx of      Macular Degeneration No family hx of          Current Outpatient Medications   Medication Sig Dispense Refill     aspirin 81 MG tablet Take  by mouth daily.       CALCIUM 500 OR 1 per day        cholecalciferol (VITAMIN D) 1000 UNIT tablet Take 1 tablet by mouth daily.       furosemide (LASIX) 20 MG tablet Take 1 tablet (20 mg) by mouth daily 90 tablet 1     levothyroxine (SYNTHROID/LEVOTHROID) 75 MCG tablet TAKE 1 TABLET BY MOUTH ONCE DAILY 90 tablet 3     lisinopril (PRINIVIL/ZESTRIL) 20 MG tablet Take 1 tablet (20 mg) by mouth 2 times daily 180 tablet 1     metoprolol succinate ER (TOPROL-XL) 50 MG 24 hr tablet Take 2 tablets (100 mg) by mouth daily 90 tablet 1     Multiple Vitamin (MULTI-DAY VITAMINS PO) Take  by mouth. 1 tab daily       simvastatin (ZOCOR) 20 MG tablet Take 1 tablet (20 mg) by mouth daily 90 tablet 3     Allergies   Allergen Reactions     Amlodipine Swelling     Side effect / swelling     Hctz [Hydrochlorothiazide]      Low sodium       BP Readings from Last 3 Encounters:   19 154/82    06/25/19 160/86   06/06/19 (!) 138/96    Wt Readings from Last 3 Encounters:   07/16/19 73.9 kg (163 lb)   06/25/19 73.5 kg (162 lb)   07/18/18 70.3 kg (155 lb)                    Reviewed and updated as needed this visit by Provider         Review of Systems   ROS COMP: Constitutional, HEENT, cardiovascular, pulmonary, gi and gu systems are negative, except as otherwise noted.      Objective    /90   Pulse 72   Temp 98.5  F (36.9  C) (Tympanic)   Wt 73.9 kg (163 lb)   SpO2 96%   BMI 29.57 kg/m    Body mass index is 29.57 kg/m .  Physical Exam   GENERAL: healthy, alert and no distress  MS: no gross musculoskeletal defects noted, no edema  PSYCH: mentation appears normal, affect normal/bright    Diagnostic Test Results:  Labs reviewed in Epic        Assessment & Plan     1. Hypertension goal BP (blood pressure) < 140/90  She did not tolerate amlodipine due to swelling  hydrochlorothiazide caused low sodium   She will continue with the lasix.   Increase the dose of the lisinopril to 20 mg BID dosing  Continue with the metoprolol 100 mg daily  Plan follow up in 1-2 months  She is going to transition care to Internal Medicine with her next appointment.   - lisinopril (PRINIVIL/ZESTRIL) 20 MG tablet; Take 1 tablet (20 mg) by mouth 2 times daily  Dispense: 180 tablet; Refill: 1  - furosemide (LASIX) 20 MG tablet; Take 1 tablet (20 mg) by mouth daily  Dispense: 90 tablet; Refill: 1     Return in about 4 weeks (around 8/13/2019).    Kristen M. Kehr, PA-C  [facility]

## 2019-07-17 ENCOUNTER — TELEPHONE (OUTPATIENT)
Dept: FAMILY MEDICINE | Facility: CLINIC | Age: 79
End: 2019-07-17

## 2019-07-17 NOTE — TELEPHONE ENCOUNTER
Panel Management Review      Patient has the following on her problem list:     Hypertension   Last three blood pressure readings:  BP Readings from Last 3 Encounters:   07/16/19 154/82   06/25/19 160/86   06/06/19 (!) 138/96     Blood pressure: MONITOR    HTN Guidelines:  Less than 140/90      Composite cancer screening  Chart review shows that this patient is due/due soon for the following None  Summary:    Patient is due/failing the following:   Patient was just seen 07/16/19 with Kristen Kehr PA-C.    Action needed:   Patient has an appointment on 08/16/19 with Sanjeev John to establish care/blood pressure.    Type of outreach:    none    Questions for provider review:    None                                                                                                                                    Uriah MARTIN MA       Chart routed to close .

## 2019-07-19 NOTE — MR AVS SNAPSHOT
After Visit Summary   2/14/2017    Claire Moore    MRN: [id number]           Patient Information     Date Of Birth          1940        Visit Information        Provider Department      2/14/2017 2:00 PM Rene Mitchell MD; P[facility]STO PROC ROOM [facility]        Today's Diagnoses     Complete uterine prolapse    -  1    Urinary incontinence, unspecified type        Hypertension goal BP (blood pressure) < 140/90          Care Instructions      Stress Urinary Incontinence: Having Midurethral Sling Surgery  To help treat stress urinary incontinence (JOSEFA), your surgeon may do midurethral sling surgery. A  sling  of tissue is placed under the urethra. The sling (tape) is made from a mesh of artificial material. When the tension of the tape is changed, urine should no longer leak. Your surgery will take about 60 minutes. You will be asked to do some things at home to get ready for surgery. Below are guidelines to help you get ready. If you have any questions, call your nurse or doctor.  The weeks before surgery    Have any tests that your doctor orders.    Tell your doctor about aspirin and other medicines, vitamins, or herbs you take. Ask if you should stop taking them before surgery.    Stop smoking to help lower your risks during surgery.    If you have been given any prescriptions to fill, do this before surgery.  The night before surgery    You may be asked to give yourself an enema. This cleans out your bowels for surgery. You ll be told how to do it.    Do not eat, drink, or chew anything after the midnight before surgery, as instructed. This includes water and chewing gum. But if you ve been told to take any medicines, swallow them with small sips of water.  The day of surgery  Arrive at the hospital a few hours before surgery as directed. Have someone drive you there who can also stay during the surgery, and drive you home. At the hospital, the healthcare staff will take  your temperature and blood pressure. In some cases, you may have tests. Then the healthcare staff will put in one or more IV (intravenous) lines. These lines give you fluids and medicines before, during, and after surgery. Some of your pubic hair may be removed. The staff may put tight stockings on your legs to help prevent blood clots.  About anesthesia  To keep you pain-free during surgery, you ll get anesthesia. General anesthesia lets you sleep during the surgery. Local anesthesia numbs the area that will be operated on. Before surgery, you ll meet with the anesthesiologist or nurse anesthetist. He or she can tell you what kind of anesthesia you will get and answer questions you may have.  During the procedure      The surgeon will make 2 small cuts (incisions) in the lower part of your belly (abdomen), near the pubic hairline. He or she will make another small incision in the front wall of the vagina.    The surgeon will work through the incisions to place the tape like a hammock under the urethra. The two ends of the tape emerge through the abdominal incisions.    If you re given local anesthesia, your surgeon may tell you to cough so that the tension of the tape can be changed.    When the tension is changed, the ends of the tape are cut. They stay below the skin in the tissue of the abdominal wall. The healing process of the incisions holds the ends of the tape in place.    The surgeon will close the incisions in the abdomen and vagina with stitches (sutures).  Risks and complications  The risks and complications of this procedure may include:    Infection    Bleeding    Risks of anesthesia    Blood clots    Damage to nerves, muscles, bladder, or nearby pelvic structures    Difficulty urinating    Urinary urgency    Problems with sling (tape)     2368-2735 [employer]. [address] [place] [address]. All rights reserved. This information is not intended as a substitute for  "professional medical care. Always follow your healthcare professional's instructions.              Follow-ups after your visit        Your next 10 appointments already scheduled     2017  9:00 AM CDT   New Visit with Shemar Jesus MD   [facility] ([facility])    [address]  [place] [address] [phone]              Who to contact     If you have questions or need follow up information about today's clinic visit or your schedule please contact [facility] directly at [phone].  Normal or non-critical lab and imaging results will be communicated to you by [employer], letter or phone within 4 business days after the clinic has received the results. If you do not hear from us within 7 days, please contact the clinic through [employer] or phone. If you have a critical or abnormal lab result, we will notify you by phone as soon as possible.  Submit refill requests through [employer] or call your pharmacy and they will forward the refill request to us. Please allow 3 business days for your refill to be completed.          Additional Information About Your Visit        MyChart Information     [employer] lets you send messages to your doctor, view your test results, renew your prescriptions, schedule appointments and more. To sign up, go to [email or web address] . Click on \"Log in\" on the left side of the screen, which will take you to the Welcome page. Then click on \"Sign up Now\" on the right side of the page.     You will be asked to enter the access code listed below, as well as some personal information. Please follow the directions to create your username and password.     Your access code is: [email or web address]  Expires: 2017 10:07 AM     Your access code will  in 90 days. If you need help or a new code, please call your [facility] or [phone].        Care EveryWhere ID     This is your Care EveryWhere ID. This could be used by other " organizations to access your [facility] medical records  [id number]        Your Vitals Were     Pulse Pulse Oximetry                90 97%           Blood Pressure from Last 3 Encounters:   02/14/17 184/88   01/24/17 (!) 180/98   01/17/17 138/80    Weight from Last 3 Encounters:   01/17/17 74.8 kg (165 lb)   05/18/16 74.8 kg (165 lb)   06/10/15 71.7 kg (158 lb)              We Performed the Following     CYSTOURETHROSCOPY        Primary Care Provider Office Phone # Fax #    Kristen M Kehr, PA-C [phone]       [facility] [address] [address] [facility] [address]        Thank you!     Thank you for choosing [facility]  for your care. Our goal is always to provide you with excellent care. Hearing back from our patients is one way we can continue to improve our services. Please take a few minutes to complete the written survey that you may receive in the mail after your visit with us. Thank you!             Your Updated Medication List - Protect others around you: Learn how to safely use, store and throw away your medicines at www.disposemymeds.org.          This list is accurate as of: 2/14/17  2:38 PM.  Always use your most recent med list.                   Brand Name Dispense Instructions for use    aspirin 81 MG tablet      Take  by mouth daily.       CALCIUM 500 PO      1 per day       cholecalciferol 1000 UNIT tablet    vitamin D     Take 1 tablet by mouth daily.       levothyroxine 75 MCG tablet    SYNTHROID    90 tablet    Take 1 tablet (75 mcg) by mouth daily       lisinopril-hydrochlorothiazide 20-25 MG per tablet    PRINZIDE/ZESTORETIC    90 tablet    Take 0.5 tablets by mouth daily APPT NEEDED FOR FURTHER REFILLS       MULTI-DAY VITAMINS PO      Take  by mouth. 1 tab daily       simvastatin 20 MG tablet    ZOCOR    90 tablet    Take 1 tablet (20 mg) by mouth daily          5)     ED Triage Vitals   BP Temp Temp src Pulse Resp SpO2 Height Weight   -- -- -- -- -- -- -- --       Physical Exam  Physical Exam   Constitutional:  Appears well, well-developed and well-nourished. No distress noted. Non toxic in appearance. HENT:     Head: Normocephalic and atraumatic. Mouth/Throat: Oropharynx is clear and mucosa moist. No oropharyngeal exudate noted. Posterior pharynx is pink and noninjected. Eyes: Conjunctivae and EOM are normal. Pupils are equal,round, and reactive to light. No scleral icterus. Neck: Normal range of motion. Neck supple. No tracheal deviation present. Cardiovascular: Normal rate, regular rhythm, normal heartsounds and intact distal pulses. Exam reveals no gallop or friction rub. No murmur heard. Hemodialysis fistula noted in the left upper extremity covered with pressure dressing. Removal shows arterial bleeding from site proximal to fistula. Surgicell and pressure dressing reapplied abruptly. Bruit appreciated at fistula. Pulmonary/Chest: Effort normal and breath sounds are symmetric and normal. No respiratory distress. There are no wheezes, rales or rhonchi. No tenderness is exhibited upon palpation of the chest wall. Abdominal: Soft. Bowel sounds are normal. No distension or no mass exhibitted. There is no tenderness, rebound, rigidity or guarding. Genitourinary:   No CVA tenderness noted on examination. Musculoskeletal: Normal range of motion. No edema, tenderness or deformity. Lymphadenopathy:  No cervical adenopathy. Neurological:   alert and oriented to person, place, and time. Normal speech, normal comprehension, normal cognition,  Reflexes are normal.  There are no cranial nerve deficits. Normal muscle tone, motor and sensory function exhibitted. Normal motor tone . Coordination and gait normal.  Normal finger to nose testing. Skin: Skin is warm and dry. Bleeding proximal to left upper extremity fistula site as noted above. No rash noted. No diaphoresis. No erythema. No pallor. Psychiatric: Pleasant and cooperative. normal mood and affect. Behavior is  normal. Judgment and thought content normal.     DIAGNOSTIC RESULTS     EKG: All EKG's are interpreted by the Emergency Department Physician who either signs or Co-signs this chart in the absence of a cardiologist.    Not indicated. RADIOLOGY:   Non-plain film images such as CT, Ultrasoundand MRI are read by the radiologist. Plain radiographic images are visualized and preliminarily interpreted by the emergency physician with the below findings:    Not indicated. Interpretation per the Radiologist below, if available at the time of this note:    No orders to display         ED BEDSIDE ULTRASOUND:   Performed by ED Physician - none    LABS:  Labs Reviewed - No data to display    All other labs were within normal range or not returned as of this dictation. EMERGENCY DEPARTMENT COURSE and DIFFERENTIAL DIAGNOSIS/MDM:   Vitals:    Vitals:    07/19/19 1123 07/19/19 1125   BP: (!) 163/85 (!) 163/85   Pulse: 80    Resp: 16    Temp: 98.9 °F (37.2 °C)    TempSrc: Oral    SpO2: 98% 99%   Weight: 214 lb (97.1 kg)    Height: 6' 1\" (1.854 m)        Noted    MDM    CRITICAL CARE TIME   Total Critical Care time was 0 minutes. [facility]  ED Course as of Jul 19 2340 Fri Jul 19, 2019   1147 Wound redressed left hemodialysis fistula. A small nonbleeding wound over the fistula. There is a bleeding wound proximal to the fistula. [MS]   1786 I requested that a call be placed to the transfer center to initiate transfer to higher level of care to include vascular surgery for apical to control arterial bleed at hemodialysis fistula site. [MS]   [phone] I spoke with the transfer center and requested [facility] ED transfer. [MS]   [phone] with the ED doctor Dr. Guerda Anderson who accepted the patient for transfer. [MS]   [phone] Patient does not want to go to [place].   He wants to go to [facility]

## 2019-07-23 DIAGNOSIS — I10 HYPERTENSION GOAL BP (BLOOD PRESSURE) < 140/90: ICD-10-CM

## 2019-07-23 NOTE — TELEPHONE ENCOUNTER
Refill request received within 30 days of last office visit with pcp.  Prescription is routed to the provider to please address refill.   Kelley Mitchell RN

## 2019-07-24 RX ORDER — METOPROLOL SUCCINATE 50 MG/1
TABLET, EXTENDED RELEASE ORAL
Qty: 90 TABLET | Refills: 1 | Status: SHIPPED | OUTPATIENT
Start: 2019-07-24 | End: 2019-08-28

## 2019-08-12 DIAGNOSIS — E78.5 HYPERLIPIDEMIA LDL GOAL <130: ICD-10-CM

## 2019-08-12 RX ORDER — SIMVASTATIN 20 MG
20 TABLET ORAL DAILY
Qty: 90 TABLET | Refills: 3 | Status: SHIPPED | OUTPATIENT
Start: 2019-08-12 | End: 2020-07-08

## 2019-08-27 NOTE — PROGRESS NOTES
"Subjective     Claire Moore is a 79 year old female who presents to clinic today for the following health issues:    HPI   New Patient/Transfer of Care  Hypertension Follow-up      Do you check your blood pressure regularly outside of the clinic? Yes     Are you following a low salt diet? Yes    Are your blood pressures ever more than 140 on the top number (systolic) OR more   than 90 on the bottom number (diastolic), for example 140/90? Yes      How many servings of fruits and vegetables do you eat daily?  2-3    On average, how many sweetened beverages do you drink each day (soda, juice, sweet tea, etc)?   0    How many days per week do you miss taking your medication? 0    Had lower extremity edema from amlodipine and hyponatremia with hydrochlorothiazide.    Social History     Social History Narrative    Grew up near [place]    Worked as hairdresser     2004    Daughter and son    Living with granddaughter (and her ) in [place] and working as their homemaker    1 gzhen    [facility] [employer]     1. Hypertension goal BP (blood pressure) < 140/90        PMH: Updated and/or reviewed in chart.    ROS:  Constitutional, cardiovascular, pulmonary, gi and gu systems are otherwise negative.    Objective   OBJECTIVE:                                                    BP (!) 197/105   Pulse 95   Temp 97.6  F (36.4  C) (Tympanic)   Resp 16   Ht 1.581 m (5' 2.25\")   Wt 72.1 kg (159 lb)   SpO2 96%   BMI 28.85 kg/m      GEN: No acute distress  EYES: No conjunctival injection or icterus, EOMI grossly intact  CV: regular rate and rhythm no murmurs, rubs, or gallops, trace lower extremity edema  RESP: Unlabored, regular, clear anterior apices  NEURO: Normal gait, MAEx4, light touch sensation grossly intact  PSYCH: Normal mood and affect      Results for orders placed or performed in visit on 07/11/19   **Basic metabolic panel FUTURE anytime   Result Value Ref Range    Sodium 136 133 - 144 mmol/L    " Potassium 5.1 3.4 - 5.3 mmol/L    Chloride 102 94 - 109 mmol/L    Carbon Dioxide 26 20 - 32 mmol/L    Anion Gap 8 3 - 14 mmol/L    Glucose 107 (H) 70 - 99 mg/dL    Urea Nitrogen 26 7 - 30 mg/dL    Creatinine 0.88 0.52 - 1.04 mg/dL    GFR Estimate 62 >60 mL/min/[1.73_m2]    GFR Estimate If Black 72 >60 mL/min/[1.73_m2]    Calcium 9.8 8.5 - 10.1 mg/dL      Assessment & Plan   ASSESSMENT/PLAN:                                                    1. Hypertension goal BP (blood pressure) < 140/90  OK to defer renal Doppler until follow-up -- metoprolol to carvedilol uptitration now as tolerated.  - US Renal Complete w Doppler Complete; Future  - carvedilol (COREG) 6.25 MG tablet; Take 1 tablet (6.25 mg) by mouth 2 times daily (with meals)  Dispense: 60 tablet; Refill: 0  - carvedilol (COREG) 3.125 MG tablet; Take 1 tablet (3.125 mg) by mouth 2 times daily (with meals) for 7 days  Dispense: 14 tablet; Refill: 0      Orders Placed This Encounter     US Renal Complete w Doppler Complete     carvedilol (COREG) 6.25 MG tablet     carvedilol (COREG) 3.125 MG tablet        Return in about 2 weeks (around 9/11/2019) for brief HTN f/u.    Sanjeev Bray MD

## 2019-08-28 ENCOUNTER — OFFICE VISIT (OUTPATIENT)
Dept: INTERNAL MEDICINE | Facility: CLINIC | Age: 79
End: 2019-08-28
Payer: COMMERCIAL

## 2019-08-28 VITALS
SYSTOLIC BLOOD PRESSURE: 149 MMHG | BODY MASS INDEX: 29.26 KG/M2 | TEMPERATURE: 97.6 F | HEIGHT: 62 IN | OXYGEN SATURATION: 96 % | DIASTOLIC BLOOD PRESSURE: 87 MMHG | RESPIRATION RATE: 16 BRPM | HEART RATE: 83 BPM | WEIGHT: 159 LBS

## 2019-08-28 DIAGNOSIS — I10 HYPERTENSION GOAL BP (BLOOD PRESSURE) < 140/90: Primary | ICD-10-CM

## 2019-08-28 PROCEDURE — 99214 OFFICE O/P EST MOD 30 MIN: CPT | Performed by: INTERNAL MEDICINE

## 2019-08-28 RX ORDER — CARVEDILOL 6.25 MG/1
6.25 TABLET ORAL 2 TIMES DAILY WITH MEALS
Qty: 60 TABLET | Refills: 0 | Status: SHIPPED | OUTPATIENT
Start: 2019-09-04 | End: 2019-10-01

## 2019-08-28 RX ORDER — CARVEDILOL 3.12 MG/1
3.12 TABLET ORAL 2 TIMES DAILY WITH MEALS
Qty: 14 TABLET | Refills: 0 | Status: SHIPPED | OUTPATIENT
Start: 2019-08-28 | End: 2019-09-13

## 2019-08-28 SDOH — HEALTH STABILITY: MENTAL HEALTH: HOW MANY STANDARD DRINKS CONTAINING ALCOHOL DO YOU HAVE ON A TYPICAL DAY?: 1 OR 2

## 2019-08-28 SDOH — HEALTH STABILITY: MENTAL HEALTH: HOW OFTEN DO YOU HAVE A DRINK CONTAINING ALCOHOL?: 2-3 TIMES A WEEK

## 2019-08-28 ASSESSMENT — MIFFLIN-ST. JEOR: SCORE: 1143.44

## 2019-09-13 ENCOUNTER — OFFICE VISIT (OUTPATIENT)
Dept: INTERNAL MEDICINE | Facility: CLINIC | Age: 79
End: 2019-09-13
Payer: COMMERCIAL

## 2019-09-13 VITALS
DIASTOLIC BLOOD PRESSURE: 80 MMHG | OXYGEN SATURATION: 94 % | HEIGHT: 62 IN | RESPIRATION RATE: 16 BRPM | TEMPERATURE: 97.6 F | BODY MASS INDEX: 29.26 KG/M2 | SYSTOLIC BLOOD PRESSURE: 163 MMHG | WEIGHT: 159 LBS | HEART RATE: 63 BPM

## 2019-09-13 DIAGNOSIS — I10 HYPERTENSION GOAL BP (BLOOD PRESSURE) < 140/90: Primary | ICD-10-CM

## 2019-09-13 DIAGNOSIS — R03.0 ELEVATED BLOOD PRESSURE READING WITHOUT DIAGNOSIS OF HYPERTENSION: ICD-10-CM

## 2019-09-13 PROCEDURE — 99214 OFFICE O/P EST MOD 30 MIN: CPT | Performed by: INTERNAL MEDICINE

## 2019-09-13 ASSESSMENT — MIFFLIN-ST. JEOR: SCORE: 1143.44

## 2019-09-13 NOTE — PROGRESS NOTES
"Subjective     Claire Moore is a 79 year old female who presents to clinic today for the following health issues:    HPI   Hypertension Follow-up      Do you check your blood pressure regularly outside of the clinic? Yes     Are you following a low salt diet? Yes    Are your blood pressures ever more than 140 on the top number (systolic) OR more   than 90 on the bottom number (diastolic), for example 140/90? Yes      How many servings of fruits and vegetables do you eat daily?  2-3    On average, how many sweetened beverages do you drink each day (soda, juice, sweet tea, etc)?   0    How many days per week do you miss taking your medication? 0    Feeling well overall.  Tolerating medications well.  Denies dizziness, lightheadedness, headache, lower extremity edema.    1. Hypertension goal BP (blood pressure) < 140/90    2. Elevated blood pressure reading without diagnosis of hypertension         PMH: Updated and/or reviewed in chart.    ROS:  Constitutional, cardiovascular, pulmonary, gi and gu systems are otherwise negative.    Objective   OBJECTIVE:                                                    BP (!) 163/80   Pulse 63   Temp 97.6  F (36.4  C) (Tympanic)   Resp 16   Ht 1.581 m (5' 2.25\")   Wt 72.1 kg (159 lb)   SpO2 94%   BMI 28.85 kg/m      GEN: No acute distress  EYES: No conjunctival injection or icterus, EOMI grossly intact  RESP: Unlabored, regular  NEURO: Normal gait, MAEx4, light touch sensation grossly intact  PSYCH: Normal mood and affect    Results for orders placed or performed in visit on 07/11/19   **Basic metabolic panel FUTURE anytime   Result Value Ref Range    Sodium 136 133 - 144 mmol/L    Potassium 5.1 3.4 - 5.3 mmol/L    Chloride 102 94 - 109 mmol/L    Carbon Dioxide 26 20 - 32 mmol/L    Anion Gap 8 3 - 14 mmol/L    Glucose 107 (H) 70 - 99 mg/dL    Urea Nitrogen 26 7 - 30 mg/dL    Creatinine 0.88 0.52 - 1.04 mg/dL    GFR Estimate 62 >60 mL/min/[1.73_m2]    GFR Estimate If Black 72 " >60 mL/min/[1.73_m2]    Calcium 9.8 8.5 - 10.1 mg/dL      Assessment & Plan   ASSESSMENT/PLAN:                                                    1. Hypertension goal BP (blood pressure) < 140/90  2. Elevated blood pressure reading without diagnosis of hypertension   We discussed intensifying anti-hypertensive regimen slowly, being mindful of isolated elevated SBP in elderly.  Renal Doppler suggestive of possible but not blatant PEACE in setting of age-appropriate kidney function.  Continue up slowly uptitrate medications as tolerated: maxed ACE-inhibitor.  Likely highest pulse tolerance on carvedilol with some improvement.  Low-dose furosemide on board.  Intolerance of VGCCb, so may require hydralazine.  24h study to confirm clinic observations.  - 24 Hour Blood Pressure Monitor - Adult; Future        Return in about 2 weeks (around 9/27/2019) for Hypertension follow-up.    Sanjeev Bray MD

## 2019-09-16 ENCOUNTER — TELEPHONE (OUTPATIENT)
Dept: INTERNAL MEDICINE | Facility: CLINIC | Age: 79
End: 2019-09-16

## 2019-09-16 NOTE — TELEPHONE ENCOUNTER
Spoke with patient and she knows about Renal US for tomorrow (Tuesday 9-17-19).  She would like [facility] to call her in AM before US.  Questioning about BP monitor.

## 2019-09-16 NOTE — TELEPHONE ENCOUNTER
Reason for Call:  Other call back    Detailed comments: pt said [facility] never called today to sched the appts.  Pt would like a call back.  Thank you  Caller informed that calls received after 3pm may not be returned same day.        Phone Number Patient can be reached at: Other phone number:  [phone]    Best Time: any    Can we leave a detailed message on this number? YES    Call taken on 9/16/2019 at 4:36 PM by Divya Solis

## 2019-09-17 ENCOUNTER — HOSPITAL ENCOUNTER (OUTPATIENT)
Dept: ULTRASOUND IMAGING | Facility: CLINIC | Age: 79
Discharge: HOME | End: 2019-09-17
Attending: INTERNAL MEDICINE | Admitting: INTERNAL MEDICINE
Payer: COMMERCIAL

## 2019-09-17 DIAGNOSIS — I10 HYPERTENSION GOAL BP (BLOOD PRESSURE) < 140/90: ICD-10-CM

## 2019-09-17 PROCEDURE — 93975 VASCULAR STUDY: CPT | Mod: TC

## 2019-09-17 NOTE — TELEPHONE ENCOUNTER
Called patient she has not heard from [facility].  I called dept. and apparently they did try to contact me but my phone # was not working.  Cardiology will be contacting patient today

## 2019-09-20 ENCOUNTER — HOSPITAL ENCOUNTER (OUTPATIENT)
Dept: CARDIOLOGY | Facility: CLINIC | Age: 79
Discharge: HOME | End: 2019-09-20
Attending: INTERNAL MEDICINE | Admitting: INTERNAL MEDICINE
Payer: COMMERCIAL

## 2019-09-20 DIAGNOSIS — R03.0 ELEVATED BLOOD PRESSURE READING WITHOUT DIAGNOSIS OF HYPERTENSION: ICD-10-CM

## 2019-09-20 DIAGNOSIS — I10 HYPERTENSION GOAL BP (BLOOD PRESSURE) < 140/90: ICD-10-CM

## 2019-09-20 PROCEDURE — 93788 AMBL BP MNTR W/SW A/R: CPT

## 2019-09-20 PROCEDURE — 93790 AMBL BP MNTR W/SW I&R: CPT | Performed by: INTERNAL MEDICINE

## 2019-09-27 ENCOUNTER — OFFICE VISIT (OUTPATIENT)
Dept: INTERNAL MEDICINE | Facility: CLINIC | Age: 79
End: 2019-09-27
Payer: COMMERCIAL

## 2019-09-27 VITALS
DIASTOLIC BLOOD PRESSURE: 81 MMHG | HEIGHT: 62 IN | SYSTOLIC BLOOD PRESSURE: 176 MMHG | TEMPERATURE: 98 F | HEART RATE: 61 BPM | WEIGHT: 159 LBS | OXYGEN SATURATION: 96 % | RESPIRATION RATE: 16 BRPM | BODY MASS INDEX: 29.26 KG/M2

## 2019-09-27 DIAGNOSIS — I10 WHITE COAT SYNDROME WITH HYPERTENSION: Primary | ICD-10-CM

## 2019-09-27 PROCEDURE — 99213 OFFICE O/P EST LOW 20 MIN: CPT | Performed by: INTERNAL MEDICINE

## 2019-09-27 ASSESSMENT — MIFFLIN-ST. JEOR: SCORE: 1143.44

## 2019-09-27 NOTE — PROGRESS NOTES
Subjective     Claire Moore is a 79 year old female who presents to clinic today for the following health issues:    HPI   Hypertension Follow-up      Do you check your blood pressure regularly outside of the clinic? No     Are you following a low salt diet? Yes        How many servings of fruits and vegetables do you eat daily?  2-3    On average, how many sweetened beverages do you drink each day (soda, juice, sweet tea, etc)?   0    How many days per week do you miss taking your medication? 0    Reports feeling well but didn't enjoy the 23% daytime error rate on ambulatory blood pressure monitor.    1. White coat syndrome with hypertension    We reviewed and discussed 24h ambulatory blood pressure monitoring results showing normal daytime average and nocturnal dipping.  A couple of blood pressure spikes around  mm Hg but very normal otherwise, indicating pattern consistent with whitecoat hypertension adequately controlled on current regimen.  We discussed follow-up in 3 months and ancillary blood pressure rechecks every 2-3 weeks for additional interim surveillance.        I spent a total of 15 minutes with the patient of which greater than 50% were devoted to counseling and coordination of care: hypertension.

## 2019-10-11 ENCOUNTER — ALLIED HEALTH/NURSE VISIT (OUTPATIENT)
Dept: NURSING | Facility: CLINIC | Age: 79
End: 2019-10-11
Payer: COMMERCIAL

## 2019-10-11 VITALS — HEART RATE: 64 BPM | SYSTOLIC BLOOD PRESSURE: 174 MMHG | DIASTOLIC BLOOD PRESSURE: 84 MMHG

## 2019-10-11 DIAGNOSIS — Z01.30 BP CHECK: Primary | ICD-10-CM

## 2019-10-11 PROCEDURE — 99207 ZZC NO CHARGE NURSE ONLY: CPT

## 2019-10-11 NOTE — PROGRESS NOTES
Claire Moore is a 79 year old patient who comes in today for a Blood Pressure check.  Initial BP: 201/94 P 76; 2nd BP (!) 174/84   Pulse 64        Disposition: results routed to provider    I scheduled patient another ancillary blood pressure appointment on 10/25/19    Pattie Quintanilla, [facility]

## 2019-10-25 ENCOUNTER — ALLIED HEALTH/NURSE VISIT (OUTPATIENT)
Dept: NURSING | Facility: CLINIC | Age: 79
End: 2019-10-25
Payer: COMMERCIAL

## 2019-10-25 VITALS — OXYGEN SATURATION: 98 % | SYSTOLIC BLOOD PRESSURE: 192 MMHG | DIASTOLIC BLOOD PRESSURE: 92 MMHG | HEART RATE: 64 BPM

## 2019-10-25 DIAGNOSIS — I10 HTN (HYPERTENSION): Primary | ICD-10-CM

## 2019-10-25 PROCEDURE — 99207 ZZC NO CHARGE NURSE ONLY: CPT

## 2019-10-25 NOTE — PROGRESS NOTES
Claire Moore is a 79 year old patient who comes in today for a Blood Pressure check.  BP check three different times, 10 minutes between checks.  Patient was quietly sitting in exam room for 2nd and 3rd check.  Initial BP:234/115 P 80;  2nd BP: 203/97 P 64; 3rd BP (!) 192/92   Pulse 64   SpO2 98%        Disposition: provider notified while patient in the clinic. Dr. Bray advised that Coreg 6.25mg be increased to 2 tablets twice a day.  Patient to recheck BP in one week at an ancillary appointment.    Medication instructions were verbalized and written down for patient.  Appointment was made for an ancillary appointment on November 1st. Recommended patient check blood pressures at home with a home blood pressure unit and to record and bring readings to next ancillary appointment.      Pattie Quintanilla, CMA

## 2019-11-01 ENCOUNTER — ALLIED HEALTH/NURSE VISIT (OUTPATIENT)
Dept: NURSING | Facility: CLINIC | Age: 79
End: 2019-11-01
Payer: COMMERCIAL

## 2019-11-01 VITALS — OXYGEN SATURATION: 98 % | DIASTOLIC BLOOD PRESSURE: 82 MMHG | HEART RATE: 65 BPM | SYSTOLIC BLOOD PRESSURE: 164 MMHG

## 2019-11-01 DIAGNOSIS — Z01.30 BP CHECK: Primary | ICD-10-CM

## 2019-11-01 PROCEDURE — 99207 ZZC NO CHARGE NURSE ONLY: CPT

## 2019-11-01 NOTE — PROGRESS NOTES
Claire Moore is a 79 year old patient who comes in today for a Blood Pressure check.  Initial BP: 201/99, 2nd /82, 3rd BP (!) 164/82   Pulse 65   SpO2 98%        Verified with patient that she did change dosage on medication as was instructed last week.  I took 3 BP readings,10-15 minutes apart, in hopes that her BP would drop as she was waiting.    Disposition: results routed to provider    Patient states that we can call her with any new instructions  [phone]    Pattie Quintanilla, [facility]

## 2019-11-04 ENCOUNTER — TELEPHONE (OUTPATIENT)
Dept: INTERNAL MEDICINE | Facility: CLINIC | Age: 79
End: 2019-11-04

## 2019-11-04 DIAGNOSIS — I10 HYPERTENSION GOAL BP (BLOOD PRESSURE) < 140/90: Primary | ICD-10-CM

## 2019-11-04 RX ORDER — FELODIPINE 5 MG/1
5 TABLET, EXTENDED RELEASE ORAL DAILY
Qty: 30 TABLET | Refills: 0 | Status: SHIPPED | OUTPATIENT
Start: 2019-11-04 | End: 2019-12-04

## 2019-11-04 NOTE — TELEPHONE ENCOUNTER
Spoke with patient.  Informed her of new BP med and need to recheck BP per Dr Bray.  Patient verbalized understanding.  BP check made for 11-13-19.

## 2019-11-04 NOTE — TELEPHONE ENCOUNTER
Trial felodipine for further improvement in blood pressure.    It is same class as amlodipine which caused swelling.  Calcium channel blockers like these can do that, so this is a small dose.  We'll have her monitor closely and follow-up in clinic on ancillary for blood pressure recheck in 1-2 weeks after starting it.  To call if questions or concerns otherwise.

## 2019-11-04 NOTE — TELEPHONE ENCOUNTER
FYI, patient calling to let care team know that the new medication Dr Bray wanted her to start will not be in at the pharmacy until after wed of this week. So the soonest she would start it would be Thursday, then was to come back in for a recheck. States recheck has already been scheduled for 11/13. Wondering if needs to change this appointment to a later date. Please call to advise.

## 2019-11-20 ENCOUNTER — ALLIED HEALTH/NURSE VISIT (OUTPATIENT)
Dept: NURSING | Facility: CLINIC | Age: 79
End: 2019-11-20
Payer: COMMERCIAL

## 2019-11-20 VITALS — SYSTOLIC BLOOD PRESSURE: 146 MMHG | DIASTOLIC BLOOD PRESSURE: 85 MMHG | OXYGEN SATURATION: 96 % | HEART RATE: 60 BPM

## 2019-11-20 DIAGNOSIS — Z01.30 BP CHECK: Primary | ICD-10-CM

## 2019-11-20 PROCEDURE — 99207 ZZC NO CHARGE NURSE ONLY: CPT

## 2019-11-20 NOTE — PROGRESS NOTES
Claire Moore is a 79 year old patient who comes in today for a Blood Pressure check.  Initial BP: 173/76 Pulse 67;  BP (!) 146/85   Pulse 60   SpO2 96%        Patient has started new medication since her last ancillary visit.    Patient seemed happy with 2nd reading and opted not to wait for a 3rd reading today.  I told her I would forward BP reading onto Dr. Bray.     Disposition: results routed to provider    Pattie Quintanilla, [facility]

## 2019-12-02 DIAGNOSIS — I10 HYPERTENSION GOAL BP (BLOOD PRESSURE) < 140/90: ICD-10-CM

## 2019-12-02 RX ORDER — FELODIPINE 5 MG/1
5 TABLET, EXTENDED RELEASE ORAL DAILY
Qty: 30 TABLET | Refills: 0 | Status: CANCELLED | OUTPATIENT
Start: 2019-12-02

## 2019-12-02 NOTE — TELEPHONE ENCOUNTER
Requested Prescriptions   Pending Prescriptions Disp Refills     felodipine ER (PLENDIL) 5 MG 24 hr tablet 30 tablet 0     Sig: Take 1 tablet (5 mg) by mouth daily  Last Written Prescription Date:  11/6/19  Last Fill Quantity: 30,  # refills: 0   Last office visit: 9/27/2019 with prescribing provider:  Asa   Future Office Visit:   Next 5 appointments (look out 90 days)    Dec 20, 2019  9:30 AM CST  Office Visit with Sanjeev Bray MD  [facility] ([facility]) [address]  [place] [address]  [phone]   Jan 06, 2020  9:15 AM CST  Return Visit with Shemar Jeuss MD  [facility] ([facility]) [address]  [place] [address]  [phone]              There is no refill protocol information for this order

## 2019-12-03 ENCOUNTER — TELEPHONE (OUTPATIENT)
Dept: INTERNAL MEDICINE | Facility: CLINIC | Age: 79
End: 2019-12-03

## 2019-12-03 DIAGNOSIS — I10 HYPERTENSION GOAL BP (BLOOD PRESSURE) < 140/90: ICD-10-CM

## 2019-12-03 RX ORDER — CARVEDILOL 12.5 MG/1
12.5 TABLET ORAL 2 TIMES DAILY WITH MEALS
Qty: 180 TABLET | Refills: 1 | Status: SHIPPED | OUTPATIENT
Start: 2019-12-03 | End: 2020-07-08

## 2019-12-03 NOTE — TELEPHONE ENCOUNTER
Please clarify if patient should be taking Coreg 6.25mg 1 tablet BID or 2 tablets BID?    BP Readings from Last 3 Encounters:   11/20/19 (!) 146/85   11/01/19 (!) 164/82   10/25/19 (!) 192/92     Patient was seen for ancillary BP check on 11/20/19 - no med changes noted...  Noted - requesting pharmacy was sent script on 10/2/19 - that patient is taking 1 tablet BID.     carvedilol (COREG) 6.25 MG tablet 180 tablet 3 10/2/2019  No   Sig - Route: Take 1 tablet (6.25 mg) by mouth 2 times daily (with meals) - Oral            E-Prescribing Status: Receipt confirmed by pharmacy (10/2/2019  1:41 PM CDT)     Pharmacy   [facility] [address] [place] [address]     Abena Marcus, RN, BSN, PHN

## 2019-12-03 NOTE — TELEPHONE ENCOUNTER
Patient informed of higher dose RX of Carvedilol sent to pharmacy.  Patient stated she did get some of the old dose so she will finish them up by doing 6.25 mg 2 tabs BID

## 2019-12-04 RX ORDER — FELODIPINE 5 MG/1
5 TABLET, EXTENDED RELEASE ORAL DAILY
Qty: 90 TABLET | Refills: 3 | Status: SHIPPED | OUTPATIENT
Start: 2019-12-04 | End: 2020-07-08

## 2020-01-06 ENCOUNTER — OFFICE VISIT (OUTPATIENT)
Dept: OPHTHALMOLOGY | Facility: CLINIC | Age: 80
End: 2020-01-06
Payer: COMMERCIAL

## 2020-01-06 DIAGNOSIS — H40.051 OCULAR HYPERTENSION OF RIGHT EYE: Primary | ICD-10-CM

## 2020-01-06 PROCEDURE — 92083 EXTENDED VISUAL FIELD XM: CPT | Performed by: OPHTHALMOLOGY

## 2020-01-06 PROCEDURE — 92012 INTRM OPH EXAM EST PATIENT: CPT | Performed by: OPHTHALMOLOGY

## 2020-01-06 PROCEDURE — 92133 CPTRZD OPH DX IMG PST SGM ON: CPT | Performed by: OPHTHALMOLOGY

## 2020-01-06 ASSESSMENT — TONOMETRY
IOP_METHOD: TONOPEN
OS_IOP_MMHG: 12
OD_IOP_MMHG: 19

## 2020-01-06 ASSESSMENT — EXTERNAL EXAM - LEFT EYE: OS_EXAM: 2+ BROW PTOSIS

## 2020-01-06 ASSESSMENT — VISUAL ACUITY
OS_CC: NLP
OD_CC: 20/30-1
METHOD: SNELLEN - LINEAR

## 2020-01-06 ASSESSMENT — EXTERNAL EXAM - RIGHT EYE: OD_EXAM: 2+ BROW PTOSIS

## 2020-01-06 ASSESSMENT — SLIT LAMP EXAM - LIDS
COMMENTS: 1+ DERMATOCHALASIS - UPPER LID
COMMENTS: 1+ DERMATOCHALASIS - UPPER LID

## 2020-01-06 NOTE — PROGRESS NOTES
Current Eye Medications:  Tears infrequently     Subjective:  6 mo iop with oct and hvf  Pt reports that she continues to see well and reports no other eye problems.     Objective:  See Ophthalmology Exam.       Assessment:  Stable intraocular pressure right eye in patient who is a glaucoma suspect.  Baseline glaucoma OCT and Esteban Visual Field.      Plan:  Continue observation with regard to glaucoma suspect status.  Return visit in 6 months for complete exam.     Shemar Jesus M.D.  [phone]

## 2020-01-06 NOTE — PATIENT INSTRUCTIONS
Continue observation with regard to glaucoma suspect status.  Return visit in 6 months for complete exam.     Shemar Jesus M.D.  [phone]

## 2020-01-06 NOTE — LETTER
1/6/2020         RE: Claire Moore  [address]  [place] [address]        Dear Colleague,    Thank you for referring your patient, Claire Moore, to the [facility]. Please see a copy of my visit note below.     Current Eye Medications:  Tears infrequently     Subjective:  6 mo iop with oct and hvf  Pt reports that she continues to see well and reports no other eye problems.     Objective:  See Ophthalmology Exam.       Assessment:  Stable intraocular pressure right eye in patient who is a glaucoma suspect.  Baseline glaucoma OCT and Esteban Visual Field.      Plan:  Continue observation with regard to glaucoma suspect status.  Return visit in 6 months for complete exam.     Shemar Jesus M.D.  [phone]           Again, thank you for allowing me to participate in the care of your patient.        Sincerely,        Shemar Jesus MD

## 2020-01-22 ENCOUNTER — OFFICE VISIT (OUTPATIENT)
Dept: FAMILY MEDICINE | Facility: CLINIC | Age: 80
End: 2020-01-22
Payer: COMMERCIAL

## 2020-01-22 VITALS
BODY MASS INDEX: 28.78 KG/M2 | RESPIRATION RATE: 22 BRPM | WEIGHT: 158.6 LBS | HEART RATE: 87 BPM | DIASTOLIC BLOOD PRESSURE: 88 MMHG | SYSTOLIC BLOOD PRESSURE: 170 MMHG | OXYGEN SATURATION: 96 % | TEMPERATURE: 97 F

## 2020-01-22 DIAGNOSIS — H91.90 HEARING LOSS, UNSPECIFIED HEARING LOSS TYPE, UNSPECIFIED LATERALITY: ICD-10-CM

## 2020-01-22 DIAGNOSIS — H61.22 IMPACTED CERUMEN OF LEFT EAR: Primary | ICD-10-CM

## 2020-01-22 DIAGNOSIS — I10 HYPERTENSION GOAL BP (BLOOD PRESSURE) < 140/90: ICD-10-CM

## 2020-01-22 PROCEDURE — 69209 REMOVE IMPACTED EAR WAX UNI: CPT | Mod: LT | Performed by: FAMILY MEDICINE

## 2020-01-22 PROCEDURE — 92551 PURE TONE HEARING TEST AIR: CPT | Performed by: FAMILY MEDICINE

## 2020-01-22 PROCEDURE — 99214 OFFICE O/P EST MOD 30 MIN: CPT | Mod: 25 | Performed by: FAMILY MEDICINE

## 2020-01-22 NOTE — NURSING NOTE
HEARING FREQUENCY    Right Ear:      1000 Hz RESPONSE- on Level: 40 db (Conditioning sound)   1000 Hz: RESPONSE- on Level:   20 db    2000 Hz: RESPONSE- on Level: 40 db   4000 Hz: RESPONSE- on Level: 45 db  6000 Hz: RESPONSE- on Level: 65 db    Left Ear:     6000 Hz: RESPONSE- on Level: 75 db   4000 Hz: RESPONSE- on Level: 70 db   2000 Hz: RESPONSE- on Level: 50 db   1000 Hz: RESPONSE- on Level: 50 db    500 Hz: RESPONSE- on Level: 45 db    Right Ear:    500 Hz: RESPONSE- on Level: 30 db    Hearing Acuity: REFER    Hearing Assessment: abnormal--refer to audiology       Kristen Conte MA

## 2020-01-22 NOTE — PROGRESS NOTES
Subjective     Claire Moore is a 79 year old female who presents to clinic today for the following health issues:    HPI       Ear Problem- Left   Possible wax build up of left ear.   Denies any pain or drainage.   Unable to hear.     Blood pressure is significantly elevated today- has a longstanding history of HTN , currently denies any symptoms referable to elevated blood pressure. Specifically denies chest pain, palpitations, dyspnea, orthopnea, PND or peripheral edema.   States that her BP is usually initially elevated when she gets into the clinic but on repeat blood pressure check it comes down and states that her PCP is aware and is working with her to adjust her BP medications.         Patient Active Problem List   Diagnosis     White coat syndrome with hypertension     Hyperlipidemia LDL goal <130     Advanced directives, counseling/discussion     DJD (degenerative joint disease)     Pseudophakia, od     Blind hypotensive eye, left     History of retinal detachment, od     Posterior vitreous detachment, right     Other specified hypothyroidism     Appendicitis     S/P laparoscopic appendectomy     Ocular hypertension of right eye     Past Surgical History:   Procedure Laterality Date     CATARACT IOL, RT/LT       COLPORRHAPHY ANTERIOR N/A 3/30/2017    Procedure: COLPORRHAPHY ANTERIOR;  Surgeon: Rene Mitchell MD;  Location:  OR     CYSTOSCOPY, SLING TRANSVAGINAL N/A 3/30/2017    Procedure: CYSTOSCOPY, SLING TRANSVAGINAL;  Surgeon: Rene Mitchell MD;  Location:  OR     D & C       LAPAROSCOPIC APPENDECTOMY N/A 2/8/2018    Procedure: LAPAROSCOPIC APPENDECTOMY;  Laparoscopic appendectomy;  Surgeon: Glenn Pacheco MD;  Location: [facility] OR     PHACOEMULSIFICATION WITH STANDARD INTRAOCULAR LENS IMPLANT  5/2015    right eye     RETINAL REATTACHMENT  remotely    right eye     TONSILLECTOMY       TUBAL LIGATION         Social History     Tobacco Use     Smoking status: Former Smoker      Packs/day: 1.00     Years: 30.00     Pack years: 30.00     Types: Cigarettes     Start date: 1965     Last attempt to quit: 2011     Years since quittin.9     Smokeless tobacco: Never Used   Substance Use Topics     Alcohol use: Yes     Frequency: 2-3 times a week     Drinks per session: 1 or 2     Comment: jessika and water 3-4x/week     Family History   Problem Relation Age of Onset     Alzheimer Disease Mother      Osteoporosis Mother      Hypertension Mother      Prostate Cancer Father      Osteoporosis Maternal Grandmother      Cancer Brother      Eye Disorder Brother      Eye Disorder Sister      Eye Disorder Son      Eye Disorder Brother      Eye Disorder Brother      Cerebrovascular Disease Paternal Grandmother      Diabetes No family hx of      Thyroid Disease No family hx of      Glaucoma No family hx of      Macular Degeneration No family hx of          Current Outpatient Medications   Medication Sig Dispense Refill     CALCIUM 500 OR 1 per day        carvedilol (COREG) 12.5 MG tablet Take 1 tablet (12.5 mg) by mouth 2 times daily (with meals) 180 tablet 1     cholecalciferol (VITAMIN D) 1000 UNIT tablet Take 1 tablet by mouth daily.       felodipine ER (PLENDIL) 5 MG 24 hr tablet Take 1 tablet (5 mg) by mouth daily 90 tablet 3     furosemide (LASIX) 20 MG tablet Take 1 tablet (20 mg) by mouth daily 90 tablet 1     levothyroxine (SYNTHROID/LEVOTHROID) 75 MCG tablet TAKE 1 TABLET BY MOUTH ONCE DAILY 90 tablet 3     lisinopril (PRINIVIL/ZESTRIL) 20 MG tablet Take 1 tablet (20 mg) by mouth 2 times daily 180 tablet 1     Multiple Vitamin (MULTI-DAY VITAMINS PO) Take  by mouth. 1 tab daily       simvastatin (ZOCOR) 20 MG tablet Take 1 tablet (20 mg) by mouth daily 90 tablet 3     Allergies   Allergen Reactions     Amlodipine Swelling     Side effect / swelling     Hctz [Hydrochlorothiazide]      Low sodium           Reviewed and updated as needed this visit by Provider         Review of Systems    ROS COMP: Constitutional, HEENT, cardiovascular, pulmonary, gi and gu systems are negative, except as otherwise noted.      Objective    BP (!) 170/88   Pulse 87   Temp 97  F (36.1  C) (Tympanic)   Resp 22   Wt 71.9 kg (158 lb 9.6 oz)   SpO2 96%   Breastfeeding No   BMI 28.78 kg/m    Body mass index is 28.78 kg/m .  Physical Exam   GENERAL: healthy, alert and no distress  HENT: Left cerumen impaction on initial exam. Repeat exam after ear lavage- noted normal ear canals and TMs. Nose and mouth without ulcers or lesions    Diagnostic Test Results:  none         Assessment & Plan     Claire was seen today for ear problem.    Diagnoses and all orders for this visit:    Impacted cerumen of left ear  -     HC REMOVAL IMPACTED CERUMEN IRRIGATION/LVG UNILAT- sucessful    Hearing loss, unspecified hearing loss type, unspecified laterality  -     SCREENING TEST, PURE TONE, AIR ONLY- failed. See nursing notes for details.   -     AUDIOLOGY ADULT REFERRAL  -     OTOLARYNGOLOGY REFERRAL    Hypertension goal BP (blood pressure) < 140/90; uncontrolled  Repeat BP at the end of the visit was still elevated above goal.   Patient to follow up with PCP for BP med adjustment as previously scheduled.         Return in about 2 weeks (around 2/5/2020) for Follow up- BP check.    Irais Haney MD  [facility]

## 2020-01-22 NOTE — PATIENT INSTRUCTIONS
Patient Education     Earwax Removal    The ear canal makes earwax from the canal s lining. The ears make wax to lubricate and protect the ear canal. The ear canal is the tube that connects the middle ear to the outside of the ear. The wax protects the ear from bacteria, infection, and damage from water or trauma.  The wax that forms in the canal naturally moves toward the outside of the ear and falls out. In some cases, the ear may make too much wax. If the wax causes problems or keeps the healthcare provider from seeing into the ear, the extra wax may be removed.  Too much wax can affect your hearing. It can cause itching. In rare cases, it can be painful. Earwax should not be removed unless it is causing a problem. You should not stick objects into your ear to remove wax unless told to do so by your healthcare provider.  Healthcare providers can remove earwax safely. It is important to stay still during the procedure to avoid damage to the ear canal. But removing earwax generally doesn t hurt. You will not usually need anesthesia or pain medicine when the provider removes the earwax.  A number of conditions lead to earwax buildup. These include some skin problems, a narrow ear canal, or ears that make too much earwax. Using cotton swabs in the canal pushes earwax deeper into the ear and contributes to the buildup of earwax.  Home care    The healthcare provider may recommend mineral oil or an over-the-counter eardrop to use at home to soften the earwax. Use these products only if the provider recommends them. Carefully follow the instructions given.    Don t use mineral oil or OTC eardrops if you might have an ear infection or a ruptured eardrum. Tell your healthcare provider right away if you have diabetes or an immune disorder.    Don t use cotton swabs in your ears. Cotton swabs may push wax deeper into the ear canal or damage the eardrum. Use cotton gauze or a wet washcloth  to gently remove wax on the  outside of the ear and around the opening to the ear canal.    Don't use any probing device or object such as cotton-tipped swabs or jerri pins to clean the inside of your ears.    Don t use ear candles to clean your ears. Candling can be dangerous. It can burn the ear canal. It can also make the condition worse instead of better.    Don t use cold water to rinse the ear. This will make you dizzy. If your provider tells you to rinse your ear, use only warm water or follow his or her instructions.    Check the ear for signs of infection or irritation listed below under When to seek medical advice.  Steps for using eardrops  1. Warm the medicine bottle by rubbing it between your hands for a few minutes.  2. Lie down on your side, with the affected ear up.  3. Place the recommended number of drops in the ear. Wet a cotton ball with the medicine. Gently put the cotton ball into the ear opening.    Follow-up care  Follow up with your healthcare provider, or as directed.  When to seek medical advice  Call the provider right away if you have:    Ear pain that gets worse    Fever of 100.4F F (38 C) or higher, or as directed by your healthcare provider    Worsening wax buildup    Severe pain, dizziness, or nausea    Bleeding from the ear    Hearing problems    Signs of irritation from the eardrops, such as burning, stinging, or swelling and tenderness    Foul-smelling fluid draining from the ear    Swelling, redness, or tenderness of the outer ear    Headache, neck pain, or stiff neck  Date Last Reviewed: 6/1/2017 2000-2019 [employer]. [address] [place] [address], [place] [address]. All rights reserved. This information is not intended as a substitute for professional medical care. Always follow your healthcare professional's instructions.

## 2020-01-29 NOTE — NURSING NOTE
Patient presented to clinic for Bilateral ear wash. Both ear(s) were inspected with an otoscope and found to have cerumen in the ear canal(s). The patient has not been using OTC debrox for 0 weeks prior to today. The patient's ear(s) were washed out with a hydrogen peroxide/water mix. The patient did tolerate the procedure well.       Kristen Conte MA

## 2020-02-03 DIAGNOSIS — I10 HYPERTENSION GOAL BP (BLOOD PRESSURE) < 140/90: ICD-10-CM

## 2020-02-03 NOTE — TELEPHONE ENCOUNTER
Requested Prescriptions   Pending Prescriptions Disp Refills     lisinopril (PRINIVIL/ZESTRIL) 20 MG tablet 180 tablet 1     Sig: Take 1 tablet (20 mg) by mouth 2 times daily  Last Written Prescription Date:  10/17/19  Last Fill Quantity: 180,  # refills: 0   Last office visit: 9/27/2019 with prescribing provider: Asa  Future Office Visit:   Next 5 appointments (look out 90 days)    Feb 05, 2020  2:00 PM CST  Office Visit with Sanjeev Bray MD  [facility] [place] ([facility]) [address] [address]  [phone]              There is no refill protocol information for this order

## 2020-02-04 ENCOUNTER — OFFICE VISIT (OUTPATIENT)
Dept: AUDIOLOGY | Facility: CLINIC | Age: 80
End: 2020-02-04
Payer: COMMERCIAL

## 2020-02-04 ENCOUNTER — OFFICE VISIT (OUTPATIENT)
Dept: OTOLARYNGOLOGY | Facility: CLINIC | Age: 80
End: 2020-02-04
Payer: COMMERCIAL

## 2020-02-04 VITALS — BODY MASS INDEX: 28.67 KG/M2 | WEIGHT: 158 LBS

## 2020-02-04 DIAGNOSIS — H90.3 SENSORINEURAL HEARING LOSS, BILATERAL: Primary | ICD-10-CM

## 2020-02-04 DIAGNOSIS — H90.3 SNHL (SENSORY-NEURAL HEARING LOSS), ASYMMETRICAL: Primary | ICD-10-CM

## 2020-02-04 PROCEDURE — 92567 TYMPANOMETRY: CPT | Performed by: AUDIOLOGIST

## 2020-02-04 PROCEDURE — 92557 COMPREHENSIVE HEARING TEST: CPT | Performed by: AUDIOLOGIST

## 2020-02-04 PROCEDURE — 99207 ZZC NO CHARGE LOS: CPT | Performed by: AUDIOLOGIST

## 2020-02-04 PROCEDURE — 99204 OFFICE O/P NEW MOD 45 MIN: CPT | Performed by: OTOLARYNGOLOGY

## 2020-02-04 RX ORDER — PREDNISONE 20 MG/1
TABLET ORAL
Qty: 28 TABLET | Refills: 0 | Status: SHIPPED | OUTPATIENT
Start: 2020-02-04 | End: 2020-07-08

## 2020-02-04 NOTE — LETTER
2/4/2020         RE: Claire Moore  [address]  [place] [address]        Dear Colleague,    Thank you for referring your patient, Claire Moore, to the [facility]. Please see a copy of my visit note below.    I am seeing this patient in consultation for hearing loss at the request of the provider Dr. Irais Haney.    Chief Complaint - Hearing loss    History of Present Illness - Claire Moore is a 79 year old female who presents to me today with hearing loss in the left ear.  It has been present and noticeable for approximately 2 weeks.  It was sudden in onset.  The patient has noticed increased difficulty hearing certain sounds and difficulty in understanding others, especially in noisy or crowded situations. She has had some tinnitus, left sided. There is no history of recent head trauma, or chronic ear disease or ear surgery.  With regards to recreational, , and work-related noise exposure she denies having any. No family history of hearing loss at a young age. Dad and brother with loss due to noise exposure. The patient denies otorrhea and otalgia. Tried ear flushing, but it didn't help.     Past Medical History -   Patient Active Problem List   Diagnosis     White coat syndrome with hypertension     Hyperlipidemia LDL goal <130     Advanced directives, counseling/discussion     DJD (degenerative joint disease)     Pseudophakia, od     Blind hypotensive eye, left     History of retinal detachment, od     Posterior vitreous detachment, right     Other specified hypothyroidism     Appendicitis     S/P laparoscopic appendectomy     Ocular hypertension of right eye       Current Medications -   Current Outpatient Medications:      CALCIUM 500 OR, 1 per day , Disp: , Rfl:      carvedilol (COREG) 12.5 MG tablet, Take 1 tablet (12.5 mg) by mouth 2 times daily (with meals), Disp: 180 tablet, Rfl: 1     cholecalciferol (VITAMIN D) 1000 UNIT tablet, Take 1 tablet by mouth daily.,  Disp: , Rfl:      felodipine ER (PLENDIL) 5 MG 24 hr tablet, Take 1 tablet (5 mg) by mouth daily, Disp: 90 tablet, Rfl: 3     furosemide (LASIX) 20 MG tablet, Take 1 tablet (20 mg) by mouth daily, Disp: 90 tablet, Rfl: 1     levothyroxine (SYNTHROID/LEVOTHROID) 75 MCG tablet, TAKE 1 TABLET BY MOUTH ONCE DAILY, Disp: 90 tablet, Rfl: 3     lisinopril (PRINIVIL/ZESTRIL) 20 MG tablet, Take 1 tablet (20 mg) by mouth 2 times daily, Disp: 180 tablet, Rfl: 1     Multiple Vitamin (MULTI-DAY VITAMINS PO), Take  by mouth. 1 tab daily, Disp: , Rfl:      simvastatin (ZOCOR) 20 MG tablet, Take 1 tablet (20 mg) by mouth daily, Disp: 90 tablet, Rfl: 3    Allergies -   Allergies   Allergen Reactions     Amlodipine Swelling     Side effect / swelling     Hctz [Hydrochlorothiazide]      Low sodium         Social History -   Social History     Socioeconomic History     Marital status:      Spouse name: Not on file     Number of children: Not on file     Years of education: Not on file     Highest education level: Not on file   Occupational History     Not on file   Social Needs     Financial resource strain: Not on file     Food insecurity:     Worry: Not on file     Inability: Not on file     Transportation needs:     Medical: Not on file     Non-medical: Not on file   Tobacco Use     Smoking status: Former Smoker     Packs/day: 1.00     Years: 30.00     Pack years: 30.00     Types: Cigarettes     Start date: 1965     Last attempt to quit: 2011     Years since quittin.0     Smokeless tobacco: Never Used   Substance and Sexual Activity     Alcohol use: Yes     Frequency: 2-3 times a week     Drinks per session: 1 or 2     Comment: jessika and water 3-4x/week     Drug use: No     Sexual activity: Never   Lifestyle     Physical activity:     Days per week: Not on file     Minutes per session: Not on file     Stress: Not on file   Relationships     Social connections:     Talks on phone: Not on file     Gets together:  Not on file     Attends [employer] service: Not on file     Active member of club or organization: Not on file     Attends meetings of clubs or organizations: Not on file     Relationship status: Not on file     Intimate partner violence:     Fear of current or ex partner: Not on file     Emotionally abused: Not on file     Physically abused: Not on file     Forced sexual activity: Not on file   Other Topics Concern     Parent/sibling w/ CABG, MI or angioplasty before 65F 55M? Not Asked   Social History Narrative    Grew up near [place]    Worked as hairdresser     2004    Daughter and son    Living with granddaughter (and her ) in [place] and working as their homemaker    1 gzhen    Enjoys going to the [employer]       Family History -   Family History   Problem Relation Age of Onset     Alzheimer Disease Mother      Osteoporosis Mother      Hypertension Mother      Prostate Cancer Father      Osteoporosis Maternal Grandmother      Cancer Brother      Eye Disorder Brother      Eye Disorder Sister      Eye Disorder Son      Eye Disorder Brother      Eye Disorder Brother      Cerebrovascular Disease Paternal Grandmother      Diabetes No family hx of      Thyroid Disease No family hx of      Glaucoma No family hx of      Macular Degeneration No family hx of      Review of Systems - As per HPI and PMHx, otherwise 10+ comprehensive system review is negative.    Physical Exam  Wt 71.7 kg (158 lb)   BMI 28.67 kg/m     General - The patient is in no distress.  Alert and oriented to person and place, answers questions and cooperates with examination appropriately.   Voice and Breathing - The patient was breathing comfortably without the use of accessory muscles. There was no wheezing, stridor, or stertor.  The patients voice was clear and strong.  Ears - The auricles are normal. The tympanic membranes are normal in appearance, bony landmarks are intact.  No retraction, perforation, or masses.  No fluid or purulence was  seen in the external canal or the middle ear. No evidence of infection of the middle ear or external canal, cerumen was normal in appearance.  Eyes - Extraocular movements intact.  Sclera were not icteric or injected.  Neck - Soft, non-tender. Palpation of the occipital, submental, submandibular, internal jugular chain, and supraclavicular nodes did not demonstrate any abnormal lymph nodes or masses. Parotid glands had no masses. Palpation of the thyroid was soft and smooth, with no nodules or goiter appreciated.  The trachea was mobile and midline.  Neurological - Cranial nerves 2 through 12 were grossly intact. House-Brackmann grade 1 out of 6 bilaterally.   Cardiovascular - carotid pulses are 2+ bilaterally, regular rhythm      Audiologic Studies - An audiogram and tympanogram were performed today as part of the evaluation and personally reviewed. The tympanogram shows a normal Type A curve, with normal canal volume and middle ear pressure.  There is no sign of eustachian tube dysfunction or middle ear effusion.  The audiogram showed a significant down-sloping mid to high frequency sensorineural hearing loss on the right, and a moderate down-sloping to moderately severe sensorineural hearing loss on the left with significant asymmetry on the left. Also word recognition scores are worse on the left.       Assessment and Plan - Claire Moore is a 79 year old female who presents to me today with left-sided idiopathic sudden sensorineural hearing loss. This happened 2 weeks ago. I recommend a prednisone burst and taper. We discussed an MRI to rule-out retrocochlear pathology, but she deferred for now. Return in 2 weeks with recheck audiogram. We discussed the risks, benefits, and alternatives of the prednisone. She wishes to proceed.     Saurabh Hill MD  Otolaryngology  [facility]        Again, thank you for allowing me to participate in the care of your patient.        Sincerely,        Saurabh Hill,  MD

## 2020-02-04 NOTE — PATIENT INSTRUCTIONS
General Scheduling Information  To schedule your CT/MRI scan, please contact [place] at [phone] [address] [place] [address]    To schedule your Surgery, please contact our Specialty Schedulers at [phone]    ENT Clinic Locations Clinic Hours Telephone Number     [facility]arate  [address] [place] [address]   Tuesday:       8:00am -- 4:00pm    Wednesday:  8:00am - 4:00pm   To schedule an appointment with   Dr. Hill,   please contact our   Specialty Scheduling Department at:     [phone]       [facility] Dolan  [address] [address]   Friday:          8:00am - 4:00pm         Urgent Care Locations Clinic Hours Telephone Numbers     [facility] Cox  [address] [place] [address]     Monday-Friday:     11:00pm - 9:00pm    Saturday-Sunday:  9:00am - 5:00pm   [phone]     [facility] Dolan  [address] [address]     Monday-Friday:      5:00pm - 9:00pm     Saturday-Sunday:  9:00am - 5:00pm   [phone]

## 2020-02-04 NOTE — TELEPHONE ENCOUNTER
"Routing refill request to provider for review/approval because:  Labs out of range:  bp  Patient needs to be seen because:  Pt due for follow up bp, has appt with pcp 2/5    Med pended for approval 30 day supply with reminder            Requested Prescriptions   Pending Prescriptions Disp Refills     lisinopril (PRINIVIL/ZESTRIL) 20 MG tablet 180 tablet 1     Sig: Take 1 tablet (20 mg) by mouth 2 times daily       ACE Inhibitors (Including Combos) Protocol Failed - 2/3/2020  1:58 PM        Failed - Blood pressure under 140/90 in past 12 months     BP Readings from Last 3 Encounters:   01/22/20 (!) 170/88   11/20/19 (!) 146/85   11/01/19 (!) 164/82                 Passed - Recent (12 mo) or future (30 days) visit within the authorizing provider's specialty     Patient has had an office visit with the authorizing provider or a provider within the authorizing providers department within the previous 12 mos or has a future within next 30 days. See \"Patient Info\" tab in inbasket, or \"Choose Columns\" in Meds & Orders section of the refill encounter.              Passed - Medication is active on med list        Passed - Patient is age 18 or older        Passed - No active pregnancy on record        Passed - Normal serum creatinine on file in past 12 months     Recent Labs   Lab Test 07/11/19  0852   CR 0.88             Passed - Normal serum potassium on file in past 12 months     Recent Labs   Lab Test 07/11/19  0852   POTASSIUM 5.1             Passed - No positive pregnancy test within past 12 months          "

## 2020-02-04 NOTE — PROGRESS NOTES
I am seeing this patient in consultation for hearing loss at the request of the provider Dr. Irais Haney.    Chief Complaint - Hearing loss    History of Present Illness - Claire Moore is a 79 year old female who presents to me today with hearing loss in the left ear.  It has been present and noticeable for approximately 2 weeks.  It was sudden in onset.  The patient has noticed increased difficulty hearing certain sounds and difficulty in understanding others, especially in noisy or crowded situations. She has had some tinnitus, left sided. There is no history of recent head trauma, or chronic ear disease or ear surgery.  With regards to recreational, , and work-related noise exposure she denies having any. No family history of hearing loss at a young age. Dad and brother with loss due to noise exposure. The patient denies otorrhea and otalgia. Tried ear flushing, but it didn't help.     Past Medical History -   Patient Active Problem List   Diagnosis     White coat syndrome with hypertension     Hyperlipidemia LDL goal <130     Advanced directives, counseling/discussion     DJD (degenerative joint disease)     Pseudophakia, od     Blind hypotensive eye, left     History of retinal detachment, od     Posterior vitreous detachment, right     Other specified hypothyroidism     Appendicitis     S/P laparoscopic appendectomy     Ocular hypertension of right eye       Current Medications -   Current Outpatient Medications:      CALCIUM 500 OR, 1 per day , Disp: , Rfl:      carvedilol (COREG) 12.5 MG tablet, Take 1 tablet (12.5 mg) by mouth 2 times daily (with meals), Disp: 180 tablet, Rfl: 1     cholecalciferol (VITAMIN D) 1000 UNIT tablet, Take 1 tablet by mouth daily., Disp: , Rfl:      felodipine ER (PLENDIL) 5 MG 24 hr tablet, Take 1 tablet (5 mg) by mouth daily, Disp: 90 tablet, Rfl: 3     furosemide (LASIX) 20 MG tablet, Take 1 tablet (20 mg) by mouth daily, Disp: 90 tablet, Rfl: 1     levothyroxine  (SYNTHROID/LEVOTHROID) 75 MCG tablet, TAKE 1 TABLET BY MOUTH ONCE DAILY, Disp: 90 tablet, Rfl: 3     lisinopril (PRINIVIL/ZESTRIL) 20 MG tablet, Take 1 tablet (20 mg) by mouth 2 times daily, Disp: 180 tablet, Rfl: 1     Multiple Vitamin (MULTI-DAY VITAMINS PO), Take  by mouth. 1 tab daily, Disp: , Rfl:      simvastatin (ZOCOR) 20 MG tablet, Take 1 tablet (20 mg) by mouth daily, Disp: 90 tablet, Rfl: 3    Allergies -   Allergies   Allergen Reactions     Amlodipine Swelling     Side effect / swelling     Hctz [Hydrochlorothiazide]      Low sodium         Social History -   Social History     Socioeconomic History     Marital status:      Spouse name: Not on file     Number of children: Not on file     Years of education: Not on file     Highest education level: Not on file   Occupational History     Not on file   Social Needs     Financial resource strain: Not on file     Food insecurity:     Worry: Not on file     Inability: Not on file     Transportation needs:     Medical: Not on file     Non-medical: Not on file   Tobacco Use     Smoking status: Former Smoker     Packs/day: 1.00     Years: 30.00     Pack years: 30.00     Types: Cigarettes     Start date: 1965     Last attempt to quit: 2011     Years since quittin.0     Smokeless tobacco: Never Used   Substance and Sexual Activity     Alcohol use: Yes     Frequency: 2-3 times a week     Drinks per session: 1 or 2     Comment: jessika and water 3-4x/week     Drug use: No     Sexual activity: Never   Lifestyle     Physical activity:     Days per week: Not on file     Minutes per session: Not on file     Stress: Not on file   Relationships     Social connections:     Talks on phone: Not on file     Gets together: Not on file     Attends Religion service: Not on file     Active member of club or organization: Not on file     Attends meetings of clubs or organizations: Not on file     Relationship status: Not on file     Intimate partner violence:      Fear of current or ex partner: Not on file     Emotionally abused: Not on file     Physically abused: Not on file     Forced sexual activity: Not on file   Other Topics Concern     Parent/sibling w/ CABG, MI or angioplasty before 65F 55M? Not Asked   Social History Narrative    Grew up near [place]    Worked as hairdresser     2004    Daughter and son    Living with granddaughter (and her ) in [place] and working as their homemaker    1 g-gkid    [facility]       Family History -   Family History   Problem Relation Age of Onset     Alzheimer Disease Mother      Osteoporosis Mother      Hypertension Mother      Prostate Cancer Father      Osteoporosis Maternal Grandmother      Cancer Brother      Eye Disorder Brother      Eye Disorder Sister      Eye Disorder Son      Eye Disorder Brother      Eye Disorder Brother      Cerebrovascular Disease Paternal Grandmother      Diabetes No family hx of      Thyroid Disease No family hx of      Glaucoma No family hx of      Macular Degeneration No family hx of      Review of Systems - As per HPI and PMHx, otherwise 10+ comprehensive system review is negative.    Physical Exam  Wt 71.7 kg (158 lb)   BMI 28.67 kg/m    General - The patient is in no distress.  Alert and oriented to person and place, answers questions and cooperates with examination appropriately.   Voice and Breathing - The patient was breathing comfortably without the use of accessory muscles. There was no wheezing, stridor, or stertor.  The patients voice was clear and strong.  Ears - The auricles are normal. The tympanic membranes are normal in appearance, bony landmarks are intact.  No retraction, perforation, or masses.  No fluid or purulence was seen in the external canal or the middle ear. No evidence of infection of the middle ear or external canal, cerumen was normal in appearance.  Eyes - Extraocular movements intact.  Sclera were not icteric or injected. Left pupil 3 mm fixed.  Right pupil 4 mm, reactive.  Neck - Soft, non-tender. Palpation of the occipital, submental, submandibular, internal jugular chain, and supraclavicular nodes did not demonstrate any abnormal lymph nodes or masses. Parotid glands had no masses. Palpation of the thyroid was soft and smooth, with no nodules or goiter appreciated.  The trachea was mobile and midline.  Neurological - Cranial nerves 2 through 12 were grossly intact. House-Brackmann grade 1 out of 6 bilaterally.   Cardiovascular - carotid pulses are 2+ bilaterally, regular rhythm      Audiologic Studies - An audiogram and tympanogram were performed today as part of the evaluation and personally reviewed. The tympanogram shows a normal Type A curve, with normal canal volume and middle ear pressure.  There is no sign of eustachian tube dysfunction or middle ear effusion.  The audiogram showed a significant down-sloping mid to high frequency sensorineural hearing loss on the right, and a moderate down-sloping to moderately severe sensorineural hearing loss on the left with significant asymmetry on the left. Also word recognition scores are worse on the left.       Assessment and Plan - Claire Moore is a 79 year old female who presents to me today with left-sided idiopathic sudden sensorineural hearing loss. This happened 2 weeks ago. I recommend a prednisone burst and taper. We discussed an MRI to rule-out retrocochlear pathology, but she deferred for now. Return in 2 weeks with recheck audiogram. We discussed the risks, benefits, and alternatives of the prednisone. She wishes to proceed.     Saurabh Hill MD  Otolaryngology  [facility]

## 2020-02-04 NOTE — PROGRESS NOTES
AUDIOLOGY REPORT:    Patient was referred from ENT by Dr. Hill for audiology evaluation. The patient reports that she has not been hearing well with her left ear for around two weeks and that it sounds like an echo. She reports that she had wax removed but this did not improve the hearing. She notes that she had some bleeding from the left ear when it was cleaned. She has noticed gradual improvement since it started. The patient reports a family history of hearing loss, including her father and brother who had histories of noise exposure. The patient denies ear pain, pressure, dizziness, history of loud noise exposure, and history of ear problems or surgery.    Testing:    Otoscopy:   Otoscopic exam indicates ears are clear of cerumen bilaterally. Dried blood is present in the left canal.    Tympanograms:    RIGHT: normal eardrum mobility     LEFT:   normal eardrum mobility with wide tympanometric width    Reflexes (reported by stimulus ear):  Could not seal    Thresholds:   Pure Tone Thresholds assessed using conventional audiometry with good reliability from 250-8000 Hz bilaterally using insert earphones and circumaural headphones     RIGHT:  normal hearing sensitivity through 1500 Hz sloping to mild to severe sensorineural hearing loss    LEFT:    mild to moderately severe essentially sensorineural hearing loss  NOTE: significant asymmetry at 250-1500 Hz (left ear poorer)    Speech Reception Threshold:    RIGHT: 20 dB HL    LEFT:   40 dB HL  Results are in agreement with pure tone average.     Word Recognition Score:     RIGHT: 100% at 70 dB HL using NU-6 recorded word list.    LEFT:   56% at 80 dB HL using NU-6 recorded word list.    Discussed results with the patient.     Patient was returned to ENT for follow up. Recommend re-evaluation of hearing post medical management.    Rosalee Strickland, CCC-A  Licensed Audiologist #[id number]  2/4/2020

## 2020-02-05 ENCOUNTER — OFFICE VISIT (OUTPATIENT)
Dept: INTERNAL MEDICINE | Facility: CLINIC | Age: 80
End: 2020-02-05
Payer: COMMERCIAL

## 2020-02-05 VITALS
RESPIRATION RATE: 16 BRPM | SYSTOLIC BLOOD PRESSURE: 144 MMHG | HEIGHT: 62 IN | BODY MASS INDEX: 28.16 KG/M2 | WEIGHT: 153 LBS | TEMPERATURE: 97.9 F | DIASTOLIC BLOOD PRESSURE: 72 MMHG | HEART RATE: 73 BPM | OXYGEN SATURATION: 93 %

## 2020-02-05 DIAGNOSIS — E03.8 OTHER SPECIFIED HYPOTHYROIDISM: Primary | ICD-10-CM

## 2020-02-05 DIAGNOSIS — I10 HYPERTENSION GOAL BP (BLOOD PRESSURE) < 140/90: ICD-10-CM

## 2020-02-05 DIAGNOSIS — I10 WHITE COAT SYNDROME WITH HYPERTENSION: ICD-10-CM

## 2020-02-05 PROCEDURE — 99214 OFFICE O/P EST MOD 30 MIN: CPT | Performed by: INTERNAL MEDICINE

## 2020-02-05 RX ORDER — LISINOPRIL 20 MG/1
20 TABLET ORAL 2 TIMES DAILY
Qty: 180 TABLET | Refills: 3 | Status: SHIPPED | OUTPATIENT
Start: 2020-02-05 | End: 2020-07-08

## 2020-02-05 RX ORDER — LISINOPRIL 20 MG/1
20 TABLET ORAL 2 TIMES DAILY
Qty: 180 TABLET | Refills: 3 | Status: CANCELLED | OUTPATIENT
Start: 2020-02-05

## 2020-02-05 RX ORDER — FUROSEMIDE 20 MG
20 TABLET ORAL DAILY
Qty: 90 TABLET | Refills: 1 | Status: SHIPPED | OUTPATIENT
Start: 2020-02-05 | End: 2020-07-08

## 2020-02-05 ASSESSMENT — MIFFLIN-ST. JEOR: SCORE: 1116.22

## 2020-02-05 NOTE — PROGRESS NOTES
"Subjective     Claire Moore is a 79 year old female who presents to clinic today for the following health issues:    HPI   Hypertension Follow-up      Do you check your blood pressure regularly outside of the clinic? Yes     Are you following a low salt diet? Yes    Are your blood pressures ever more than 140 on the top number (systolic) OR more   than 90 on the bottom number (diastolic), for example 140/90? No      How many servings of fruits and vegetables do you eat daily?  4 or more    On average, how many sweetened beverages do you drink each day (Examples: soda, juice, sweet tea, etc.  Do NOT count diet or artificially sweetened beverages)?   0    How many days per week do you exercise enough to make your heart beat faster? 3 or less    How many minutes a day do you exercise enough to make your heart beat faster? 30 - 60    How many days per week do you miss taking your medication? 0    Raspy voice  Started Monday  Says she feels fine and her throat does not hurt  Hasn't started prednisone from ENT yet for left-sided hearing loss    1. Hypertension goal BP (blood pressure) < 140/90        PMH: Updated and/or reviewed in chart.    ROS:  Constitutional, HEENT, cardiovascular, pulmonary, gi and gu systems are otherwise negative.    Objective   OBJECTIVE:                                                    BP (!) 144/72   Pulse 73   Temp 97.9  F (36.6  C) (Tympanic)   Resp 16   Ht 1.581 m (5' 2.25\")   Wt 69.4 kg (153 lb)   SpO2 93%   BMI 27.76 kg/m      GEN: No acute distress  EYES: No conjunctival injection or icterus, EOMI grossly intact  posterior oropharynx clear with uppers intact  RESP: Unlabored, regular  NEURO: Normal gait, MAEx4, light touch sensation grossly intact  PSYCH: Normal mood and affect    No results found for any visits on 02/05/20.   Assessment & Plan   ASSESSMENT/PLAN:                                                    1. Hypertension goal BP (blood pressure) < 140/90  OK to continue " Lasix once daily given last year's 24h ambulatory blood pressure monitoring; continue regimen otherwise.  Known whitecoat hypertension effect but near goal here.  - furosemide (LASIX) 20 MG tablet; Take 1 tablet (20 mg) by mouth daily  Dispense: 90 tablet; Refill: 1    Hyperlipidemia - continue statin    Orders Placed This Encounter     furosemide (LASIX) 20 MG tablet        Return in about 6 months (around 8/5/2020).    Sanjeev Bray MD

## 2020-02-18 ENCOUNTER — OFFICE VISIT (OUTPATIENT)
Dept: AUDIOLOGY | Facility: CLINIC | Age: 80
End: 2020-02-18
Payer: COMMERCIAL

## 2020-02-18 ENCOUNTER — OFFICE VISIT (OUTPATIENT)
Dept: OTOLARYNGOLOGY | Facility: CLINIC | Age: 80
End: 2020-02-18
Payer: COMMERCIAL

## 2020-02-18 VITALS
HEIGHT: 62 IN | SYSTOLIC BLOOD PRESSURE: 187 MMHG | HEART RATE: 88 BPM | WEIGHT: 157 LBS | BODY MASS INDEX: 28.89 KG/M2 | DIASTOLIC BLOOD PRESSURE: 99 MMHG

## 2020-02-18 DIAGNOSIS — H90.3 SENSORINEURAL HEARING LOSS, BILATERAL: Primary | ICD-10-CM

## 2020-02-18 DIAGNOSIS — H90.3 SNHL (SENSORY-NEURAL HEARING LOSS), ASYMMETRICAL: Primary | ICD-10-CM

## 2020-02-18 PROCEDURE — 92567 TYMPANOMETRY: CPT | Performed by: AUDIOLOGIST

## 2020-02-18 PROCEDURE — 99213 OFFICE O/P EST LOW 20 MIN: CPT | Performed by: OTOLARYNGOLOGY

## 2020-02-18 PROCEDURE — 92557 COMPREHENSIVE HEARING TEST: CPT | Performed by: AUDIOLOGIST

## 2020-02-18 PROCEDURE — 99207 ZZC NO CHARGE LOS: CPT | Performed by: AUDIOLOGIST

## 2020-02-18 ASSESSMENT — MIFFLIN-ST. JEOR: SCORE: 1130.4

## 2020-02-18 NOTE — LETTER
2/18/2020         RE: Claire Moore  [address]  [place] [address]        Dear Colleague,    Thank you for referring your patient, Claire Moore, to the [facility]. Please see a copy of my visit note below.    Chief Complaint - Hearing loss    History of Present Illness - Claire Moore is a 79 year old female who returns to me today with hearing loss in the left ear.  This started 4 weeks ago.  It was sudden in onset.  The patient has noticed increased difficulty hearing certain sounds and difficulty in understanding others, especially in noisy or crowded situations. She has had some tinnitus, left sided. There is no history of recent head trauma, or chronic ear disease or ear surgery.  With regards to recreational, , and work-related noise exposure she denies having any. No family history of hearing loss at a young age. Dad and brother with loss due to noise exposure. The patient denies otorrhea and otalgia. Tried ear flushing, but it didn't help. I saw her two weeks ago, and her audiogram showed idiopathic left-sided sudden sensorineural hearing loss. She deferred MRI brain. She took prednisone burst and taper and returns. She notes she is hearing better. No more tinnitus. No new symptoms. No pain.    Past Medical History -   Patient Active Problem List   Diagnosis     White coat syndrome with hypertension     Hyperlipidemia LDL goal <130     Advanced directives, counseling/discussion     DJD (degenerative joint disease)     Pseudophakia, od     Blind hypotensive eye, left     History of retinal detachment, od     Posterior vitreous detachment, right     Other specified hypothyroidism     Appendicitis     S/P laparoscopic appendectomy     Ocular hypertension of right eye       Current Medications -   Current Outpatient Medications:      CALCIUM 500 OR, 1 per day , Disp: , Rfl:      carvedilol (COREG) 12.5 MG tablet, Take 1 tablet (12.5 mg) by mouth 2 times daily (with meals),  Disp: 180 tablet, Rfl: 1     cholecalciferol (VITAMIN D) 1000 UNIT tablet, Take 1 tablet by mouth daily., Disp: , Rfl:      felodipine ER (PLENDIL) 5 MG 24 hr tablet, Take 1 tablet (5 mg) by mouth daily, Disp: 90 tablet, Rfl: 3     furosemide (LASIX) 20 MG tablet, Take 1 tablet (20 mg) by mouth daily, Disp: 90 tablet, Rfl: 1     levothyroxine (SYNTHROID/LEVOTHROID) 75 MCG tablet, TAKE 1 TABLET BY MOUTH ONCE DAILY, Disp: 90 tablet, Rfl: 3     lisinopril (PRINIVIL/ZESTRIL) 20 MG tablet, Take 1 tablet (20 mg) by mouth 2 times daily, Disp: 180 tablet, Rfl: 3     Multiple Vitamin (MULTI-DAY VITAMINS PO), Take  by mouth. 1 tab daily, Disp: , Rfl:      predniSONE (DELTASONE) 20 MG tablet, Take 3 tabs by mouth daily x 7 days, then 2 tabs daily x 2 days, then 1 tab daily x 2 days, then 1/2 tab daily x 2 days. (Patient not taking: Reported on 2020), Disp: 28 tablet, Rfl: 0     simvastatin (ZOCOR) 20 MG tablet, Take 1 tablet (20 mg) by mouth daily, Disp: 90 tablet, Rfl: 3    Allergies -   Allergies   Allergen Reactions     Amlodipine Swelling     Side effect / swelling     Hctz [Hydrochlorothiazide]      Low sodium         Social History -   Social History     Socioeconomic History     Marital status:      Spouse name: Not on file     Number of children: Not on file     Years of education: Not on file     Highest education level: Not on file   Occupational History     Not on file   Social Needs     Financial resource strain: Not on file     Food insecurity:     Worry: Not on file     Inability: Not on file     Transportation needs:     Medical: Not on file     Non-medical: Not on file   Tobacco Use     Smoking status: Former Smoker     Packs/day: 1.00     Years: 30.00     Pack years: 30.00     Types: Cigarettes     Start date: 1965     Last attempt to quit: 2011     Years since quittin.0     Smokeless tobacco: Never Used   Substance and Sexual Activity     Alcohol use: Yes     Frequency: 2-3 times a week  "    Drinks per session: 1 or 2     Comment: jessika and water 3-4x/week     Drug use: No     Sexual activity: Never   Lifestyle     Physical activity:     Days per week: Not on file     Minutes per session: Not on file     Stress: Not on file   Relationships     Social connections:     Talks on phone: Not on file     Gets together: Not on file     Attends Yarsanism service: Not on file     Active member of club or organization: Not on file     Attends meetings of clubs or organizations: Not on file     Relationship status: Not on file     Intimate partner violence:     Fear of current or ex partner: Not on file     Emotionally abused: Not on file     Physically abused: Not on file     Forced sexual activity: Not on file   Other Topics Concern     Parent/sibling w/ CABG, MI or angioplasty before 65F 55M? Not Asked   Social History Narrative    Grew up near [place]    Worked as hairdresser     2004    Daughter and son    Living with granddaughter (and her ) in [place] and working as their homemaker    1 g-gkid    Enjoys going to the [employer]       Family History -   Family History   Problem Relation Age of Onset     Alzheimer Disease Mother      Osteoporosis Mother      Hypertension Mother      Prostate Cancer Father      Osteoporosis Maternal Grandmother      Cancer Brother      Eye Disorder Brother      Eye Disorder Sister      Eye Disorder Son      Eye Disorder Brother      Eye Disorder Brother      Cerebrovascular Disease Paternal Grandmother      Diabetes No family hx of      Thyroid Disease No family hx of      Glaucoma No family hx of      Macular Degeneration No family hx of      Review of Systems - As per HPI and PMHx, otherwise 7 system review of the head and neck is negative.    Physical Exam  BP (!) 187/99   Pulse 88   Ht 1.575 m (5' 2\")   Wt 71.2 kg (157 lb)   BMI 28.72 kg/m     General - The patient is in no distress.  Alert and oriented to person and place, answers questions and cooperates with " examination appropriately.   Voice and Breathing - The patient was breathing comfortably without the use of accessory muscles. There was no wheezing, stridor, or stertor.  The patients voice was clear and strong.  Ears - The auricles are normal. The tympanic membranes are normal in appearance, bony landmarks are intact.  No retraction, perforation, or masses.  No fluid or purulence was seen in the external canal or the middle ear. No evidence of infection of the middle ear or external canal, cerumen was normal in appearance.  Eyes - Extraocular movements intact.  Sclera were not icteric or injected. Left pupil 3 mm fixed. Right pupil 4 mm, reactive.  Neck - Soft, non-tender. Palpation of the occipital, submental, submandibular, internal jugular chain, and supraclavicular nodes did not demonstrate any abnormal lymph nodes or masses. Parotid glands had no masses. Palpation of the thyroid was soft and smooth, with no nodules or goiter appreciated.  The trachea was mobile and midline.  Neurological - Cranial nerves 2 through 12 were grossly intact. House-Brackmann grade 1 out of 6 bilaterally.         Audiologic Studies - An audiogram and tympanogram were performed today as part of the evaluation and personally reviewed. The tympanogram shows a normal Type A curve, with normal canal volume and middle ear pressure.  There is no sign of eustachian tube dysfunction or middle ear effusion.  The audiogram showed a significant down-sloping mid to high frequency sensorineural hearing loss on the right, and a moderate down-sloping to moderately severe sensorineural hearing loss on the left with significant asymmetry on the left. No real pure tone changes, but word recognition scores really improved on the left.       Assessment and Plan - Claire Moore is a 79 year old female who returns to me today with idiopathic left-sided sudden sensorineural hearing loss. She defers MRI brain to r/o retrocochlear pathology. She feels  tinnitus improved. Word recognition scores improved, but pure tones were about the same. We discussed hearing aids, but she deferred for now. Recheck hearing in 6 months, sooner if things worsen or new symptoms arise.      Saurabh Hill MD  Otolaryngology  [facility]        Again, thank you for allowing me to participate in the care of your patient.        Sincerely,        Saurabh Hill MD

## 2020-02-18 NOTE — PROGRESS NOTES
Chief Complaint - Hearing loss    History of Present Illness - Claire Moore is a 79 year old female who returns to me today with hearing loss in the left ear.  This started 4 weeks ago.  It was sudden in onset.  The patient has noticed increased difficulty hearing certain sounds and difficulty in understanding others, especially in noisy or crowded situations. She has had some tinnitus, left sided. There is no history of recent head trauma, or chronic ear disease or ear surgery.  With regards to recreational, , and work-related noise exposure she denies having any. No family history of hearing loss at a young age. Dad and brother with loss due to noise exposure. The patient denies otorrhea and otalgia. Tried ear flushing, but it didn't help. I saw her two weeks ago, and her audiogram showed idiopathic left-sided sudden sensorineural hearing loss. She deferred MRI brain. She took prednisone burst and taper and returns. She notes she is hearing better. No more tinnitus. No new symptoms. No pain.    Past Medical History -   Patient Active Problem List   Diagnosis     White coat syndrome with hypertension     Hyperlipidemia LDL goal <130     Advanced directives, counseling/discussion     DJD (degenerative joint disease)     Pseudophakia, od     Blind hypotensive eye, left     History of retinal detachment, od     Posterior vitreous detachment, right     Other specified hypothyroidism     Appendicitis     S/P laparoscopic appendectomy     Ocular hypertension of right eye       Current Medications -   Current Outpatient Medications:      CALCIUM 500 OR, 1 per day , Disp: , Rfl:      carvedilol (COREG) 12.5 MG tablet, Take 1 tablet (12.5 mg) by mouth 2 times daily (with meals), Disp: 180 tablet, Rfl: 1     cholecalciferol (VITAMIN D) 1000 UNIT tablet, Take 1 tablet by mouth daily., Disp: , Rfl:      felodipine ER (PLENDIL) 5 MG 24 hr tablet, Take 1 tablet (5 mg) by mouth daily, Disp: 90 tablet, Rfl: 3      furosemide (LASIX) 20 MG tablet, Take 1 tablet (20 mg) by mouth daily, Disp: 90 tablet, Rfl: 1     levothyroxine (SYNTHROID/LEVOTHROID) 75 MCG tablet, TAKE 1 TABLET BY MOUTH ONCE DAILY, Disp: 90 tablet, Rfl: 3     lisinopril (PRINIVIL/ZESTRIL) 20 MG tablet, Take 1 tablet (20 mg) by mouth 2 times daily, Disp: 180 tablet, Rfl: 3     Multiple Vitamin (MULTI-DAY VITAMINS PO), Take  by mouth. 1 tab daily, Disp: , Rfl:      predniSONE (DELTASONE) 20 MG tablet, Take 3 tabs by mouth daily x 7 days, then 2 tabs daily x 2 days, then 1 tab daily x 2 days, then 1/2 tab daily x 2 days. (Patient not taking: Reported on 2020), Disp: 28 tablet, Rfl: 0     simvastatin (ZOCOR) 20 MG tablet, Take 1 tablet (20 mg) by mouth daily, Disp: 90 tablet, Rfl: 3    Allergies -   Allergies   Allergen Reactions     Amlodipine Swelling     Side effect / swelling     Hctz [Hydrochlorothiazide]      Low sodium         Social History -   Social History     Socioeconomic History     Marital status:      Spouse name: Not on file     Number of children: Not on file     Years of education: Not on file     Highest education level: Not on file   Occupational History     Not on file   Social Needs     Financial resource strain: Not on file     Food insecurity:     Worry: Not on file     Inability: Not on file     Transportation needs:     Medical: Not on file     Non-medical: Not on file   Tobacco Use     Smoking status: Former Smoker     Packs/day: 1.00     Years: 30.00     Pack years: 30.00     Types: Cigarettes     Start date: 1965     Last attempt to quit: 2011     Years since quittin.0     Smokeless tobacco: Never Used   Substance and Sexual Activity     Alcohol use: Yes     Frequency: 2-3 times a week     Drinks per session: 1 or 2     Comment: jessika and water 3-4x/week     Drug use: No     Sexual activity: Never   Lifestyle     Physical activity:     Days per week: Not on file     Minutes per session: Not on file     Stress:  "Not on file   Relationships     Social connections:     Talks on phone: Not on file     Gets together: Not on file     Attends Religion service: Not on file     Active member of club or organization: Not on file     Attends meetings of clubs or organizations: Not on file     Relationship status: Not on file     Intimate partner violence:     Fear of current or ex partner: Not on file     Emotionally abused: Not on file     Physically abused: Not on file     Forced sexual activity: Not on file   Other Topics Concern     Parent/sibling w/ CABG, MI or angioplasty before 65F 55M? Not Asked   Social History Narrative    Grew up near [place]    Worked as hairdresser     2004    Daughter and son    Living with granddaughter (and her ) in [place] and working as their homemaker    1 g-gkid    Enjoys going to the [employer]       Family History -   Family History   Problem Relation Age of Onset     Alzheimer Disease Mother      Osteoporosis Mother      Hypertension Mother      Prostate Cancer Father      Osteoporosis Maternal Grandmother      Cancer Brother      Eye Disorder Brother      Eye Disorder Sister      Eye Disorder Son      Eye Disorder Brother      Eye Disorder Brother      Cerebrovascular Disease Paternal Grandmother      Diabetes No family hx of      Thyroid Disease No family hx of      Glaucoma No family hx of      Macular Degeneration No family hx of      Review of Systems - As per HPI and PMHx, otherwise 7 system review of the head and neck is negative.    Physical Exam  BP (!) 187/99   Pulse 88   Ht 1.575 m (5' 2\")   Wt 71.2 kg (157 lb)   BMI 28.72 kg/m    General - The patient is in no distress.  Alert and oriented to person and place, answers questions and cooperates with examination appropriately.   Voice and Breathing - The patient was breathing comfortably without the use of accessory muscles. There was no wheezing, stridor, or stertor.  The patients voice was clear and strong.  Ears - The " auricles are normal. The tympanic membranes are normal in appearance, bony landmarks are intact.  No retraction, perforation, or masses.  No fluid or purulence was seen in the external canal or the middle ear. No evidence of infection of the middle ear or external canal, cerumen was normal in appearance.  Eyes - Extraocular movements intact.  Sclera were not icteric or injected. Left pupil 3 mm fixed. Right pupil 4 mm, reactive.  Neck - Soft, non-tender. Palpation of the occipital, submental, submandibular, internal jugular chain, and supraclavicular nodes did not demonstrate any abnormal lymph nodes or masses. Parotid glands had no masses. Palpation of the thyroid was soft and smooth, with no nodules or goiter appreciated.  The trachea was mobile and midline.  Neurological - Cranial nerves 2 through 12 were grossly intact. House-Brackmann grade 1 out of 6 bilaterally.         Audiologic Studies - An audiogram and tympanogram were performed today as part of the evaluation and personally reviewed. The tympanogram shows a normal Type A curve, with normal canal volume and middle ear pressure.  There is no sign of eustachian tube dysfunction or middle ear effusion.  The audiogram showed a significant down-sloping mid to high frequency sensorineural hearing loss on the right, and a moderate down-sloping to moderately severe sensorineural hearing loss on the left with significant asymmetry on the left. No real pure tone changes, but word recognition scores really improved on the left.       Assessment and Plan - Claire Moore is a 79 year old female who returns to me today with idiopathic left-sided sudden sensorineural hearing loss. She defers MRI brain to r/o retrocochlear pathology. She feels tinnitus improved. Word recognition scores improved, but pure tones were about the same. We discussed hearing aids, but she deferred for now. Recheck hearing in 6 months, sooner if things worsen or new symptoms arise.      Saurabh  Sergio MONSON  Otolaryngology  [facility]

## 2020-02-18 NOTE — PROGRESS NOTES
AUDIOLOGY REPORT:    Patient was referred from ENT by Dr. Hill for audiology evaluation. The patient was seen on 2/4/2020 for a sudden hearing loss in the left ear. Results showed mild to severe high frequency sensorineural hearing loss in the right ear and mild to moderately severe sensorineural hearing loss in the left ear. She returns today for follow up after steroid treatment and reports that she is unsure if her hearing has changed, though she has noticed less of an echo sound in the left ear than there was before.    Testing:    Otoscopy:   Otoscopic exam indicates ears are clear of cerumen bilaterally     Tympanograms:    RIGHT: normal eardrum mobility     LEFT:   borderline normal eardrum mobility (type As)    Thresholds:   Pure Tone Thresholds assessed using conventional audiometry with good reliability from 250-8000 Hz bilaterally using insert earphones and circumaural headphones     RIGHT:  normal hearing sensitivity through 1500 Hz sloping to mild to moderately severe sensorineural hearing loss    LEFT:    mild to moderately severe sensorineural hearing loss    Speech Reception Threshold:    RIGHT: 15 dB HL    LEFT:   40 dB HL  Results are in agreement with pure tone average.     Word Recognition Score:     RIGHT: 92% at 70 dB HL using NU-6 recorded word list.    LEFT:   92% at 80 dB HL using NU-6 recorded word list.    Discussed results with the patient. Thresholds are stable compared to 2/4/2020, though word recognition in the left ear has significantly improved.    Patient was returned to ENT for follow up.     Rosalee Strickland, CCC-A  Licensed Audiologist #[id number]  2/18/2020

## 2020-06-06 DIAGNOSIS — E03.8 OTHER SPECIFIED HYPOTHYROIDISM: ICD-10-CM

## 2020-06-06 NOTE — LETTER
June 8, 2020    Claire Moore  [address]  [place] [address]    Dear Claire,       We recently received a refill request for Levothyroxine (SYNTHROID/LEVOTHROID).  We have refilled this for a one time 90 day supply only because you are due for a:     Thyroid lab appointment, Due Now        Please call at your earliest convenience so that there will not be a delay with your future refills.        Thank you,   Your [facility] Team/mkr  [phone]

## 2020-06-08 RX ORDER — LEVOTHYROXINE SODIUM 75 UG/1
TABLET ORAL
Qty: 90 TABLET | Refills: 0 | Status: SHIPPED | OUTPATIENT
Start: 2020-06-08 | End: 2020-07-08

## 2020-06-18 ENCOUNTER — DOCUMENTATION ONLY (OUTPATIENT)
Dept: LAB | Facility: CLINIC | Age: 80
End: 2020-06-18

## 2020-06-18 DIAGNOSIS — I10 HYPERTENSION GOAL BP (BLOOD PRESSURE) < 140/90: ICD-10-CM

## 2020-06-18 DIAGNOSIS — E78.5 HYPERLIPIDEMIA LDL GOAL <130: ICD-10-CM

## 2020-06-18 DIAGNOSIS — E03.8 OTHER SPECIFIED HYPOTHYROIDISM: Primary | ICD-10-CM

## 2020-06-18 NOTE — PROGRESS NOTES
Pt has a PV-lab appointment on 6.24.20 at 0815 with no orders.  Please review pended order and place any additional future orders if necessary.      Thank you,  Bren Blair MLT  Lab

## 2020-06-24 DIAGNOSIS — E78.5 HYPERLIPIDEMIA LDL GOAL <130: ICD-10-CM

## 2020-06-24 DIAGNOSIS — E03.8 OTHER SPECIFIED HYPOTHYROIDISM: ICD-10-CM

## 2020-06-24 DIAGNOSIS — I10 HYPERTENSION GOAL BP (BLOOD PRESSURE) < 140/90: ICD-10-CM

## 2020-06-24 LAB
ANION GAP SERPL CALCULATED.3IONS-SCNC: 5 MMOL/L (ref 3–14)
BUN SERPL-MCNC: 21 MG/DL (ref 7–30)
CALCIUM SERPL-MCNC: 9.3 MG/DL (ref 8.5–10.1)
CHLORIDE SERPL-SCNC: 104 MMOL/L (ref 94–109)
CHOLEST SERPL-MCNC: 174 MG/DL
CO2 SERPL-SCNC: 27 MMOL/L (ref 20–32)
CREAT SERPL-MCNC: 0.83 MG/DL (ref 0.52–1.04)
GFR SERPL CREATININE-BSD FRML MDRD: 66 ML/MIN/{1.73_M2}
GLUCOSE SERPL-MCNC: 94 MG/DL (ref 70–99)
HDLC SERPL-MCNC: 79 MG/DL
LDLC SERPL CALC-MCNC: 63 MG/DL
NONHDLC SERPL-MCNC: 95 MG/DL
POTASSIUM SERPL-SCNC: 4.5 MMOL/L (ref 3.4–5.3)
SODIUM SERPL-SCNC: 136 MMOL/L (ref 133–144)
T4 FREE SERPL-MCNC: 1.14 NG/DL (ref 0.76–1.46)
TRIGL SERPL-MCNC: 160 MG/DL
TSH SERPL DL<=0.005 MIU/L-ACNC: 5.5 MU/L (ref 0.4–4)

## 2020-06-24 PROCEDURE — 80061 LIPID PANEL: CPT | Performed by: INTERNAL MEDICINE

## 2020-06-24 PROCEDURE — 80048 BASIC METABOLIC PNL TOTAL CA: CPT | Performed by: INTERNAL MEDICINE

## 2020-06-24 PROCEDURE — 36415 COLL VENOUS BLD VENIPUNCTURE: CPT | Performed by: INTERNAL MEDICINE

## 2020-06-24 PROCEDURE — 84443 ASSAY THYROID STIM HORMONE: CPT | Performed by: INTERNAL MEDICINE

## 2020-06-24 PROCEDURE — 84439 ASSAY OF FREE THYROXINE: CPT | Performed by: INTERNAL MEDICINE

## 2020-07-08 ENCOUNTER — OFFICE VISIT (OUTPATIENT)
Dept: INTERNAL MEDICINE | Facility: CLINIC | Age: 80
End: 2020-07-08
Payer: COMMERCIAL

## 2020-07-08 VITALS
SYSTOLIC BLOOD PRESSURE: 139 MMHG | WEIGHT: 150.8 LBS | OXYGEN SATURATION: 95 % | RESPIRATION RATE: 20 BRPM | HEART RATE: 66 BPM | BODY MASS INDEX: 26.72 KG/M2 | DIASTOLIC BLOOD PRESSURE: 81 MMHG | TEMPERATURE: 97.7 F | HEIGHT: 63 IN

## 2020-07-08 DIAGNOSIS — Z00.00 ENCOUNTER FOR MEDICARE ANNUAL WELLNESS EXAM: Primary | ICD-10-CM

## 2020-07-08 DIAGNOSIS — I10 HYPERTENSION GOAL BP (BLOOD PRESSURE) < 140/90: ICD-10-CM

## 2020-07-08 DIAGNOSIS — H43.811 POSTERIOR VITREOUS DETACHMENT, RIGHT: ICD-10-CM

## 2020-07-08 DIAGNOSIS — E78.5 HYPERLIPIDEMIA LDL GOAL <130: ICD-10-CM

## 2020-07-08 DIAGNOSIS — E03.8 OTHER SPECIFIED HYPOTHYROIDISM: ICD-10-CM

## 2020-07-08 PROCEDURE — 99214 OFFICE O/P EST MOD 30 MIN: CPT | Mod: 25 | Performed by: INTERNAL MEDICINE

## 2020-07-08 PROCEDURE — 99397 PER PM REEVAL EST PAT 65+ YR: CPT | Performed by: INTERNAL MEDICINE

## 2020-07-08 RX ORDER — FELODIPINE 5 MG/1
5 TABLET, EXTENDED RELEASE ORAL DAILY
Qty: 90 TABLET | Refills: 3 | Status: SHIPPED | OUTPATIENT
Start: 2020-07-08 | End: 2021-07-21

## 2020-07-08 RX ORDER — LEVOTHYROXINE SODIUM 75 UG/1
75 TABLET ORAL DAILY
Qty: 90 TABLET | Refills: 3 | Status: SHIPPED | OUTPATIENT
Start: 2020-07-08 | End: 2021-07-21

## 2020-07-08 RX ORDER — LISINOPRIL 20 MG/1
20 TABLET ORAL 2 TIMES DAILY
Qty: 180 TABLET | Refills: 3 | Status: SHIPPED | OUTPATIENT
Start: 2020-07-08 | End: 2021-07-21

## 2020-07-08 RX ORDER — FUROSEMIDE 20 MG
20 TABLET ORAL DAILY
Qty: 90 TABLET | Refills: 3 | Status: SHIPPED | OUTPATIENT
Start: 2020-07-08 | End: 2021-07-21

## 2020-07-08 RX ORDER — SIMVASTATIN 20 MG
20 TABLET ORAL DAILY
Qty: 90 TABLET | Refills: 3 | Status: SHIPPED | OUTPATIENT
Start: 2020-07-08 | End: 2021-07-14

## 2020-07-08 RX ORDER — CARVEDILOL 12.5 MG/1
12.5 TABLET ORAL 2 TIMES DAILY WITH MEALS
Qty: 180 TABLET | Refills: 3 | Status: SHIPPED | OUTPATIENT
Start: 2020-07-08 | End: 2021-07-21

## 2020-07-08 ASSESSMENT — MIFFLIN-ST. JEOR: SCORE: 1113.15

## 2020-07-08 NOTE — PROGRESS NOTES
"  SUBJECTIVE:   Claire Moore is a 80 year old female who presents for Preventive Visit.    Are you in the first 12 months of your Medicare Part B coverage?  No    Physical Health:    In general, how would you rate your overall physical health? excellent    Outside of work, how many days during the week do you exercise? 2-3 days/week    Outside of work, approximately how many minutes a day do you exercise?45-60 minutes    If you drink alcohol do you typically have >3 drinks per day or >7 drinks per week? Not Applicable    Do you usually eat at least 4 servings of fruit and vegetables a day, include whole grains & fiber and avoid regularly eating high fat or \"junk\" foods? Yes    Do you have any problems taking medications regularly?  No    Do you have any side effects from medications? not applicable    Needs assistance for the following daily activities: no assistance needed    Which of the following safety concerns are present in your home?  none identified     Hearing impairment: Yes, Has issues with Left ear, even after wax removal    In the past 6 months, have you been bothered by leaking of urine? no    Mental Health:    In general, how would you rate your overall mental or emotional health? excellent  PHQ-2 Score:  0    Do you feel safe in your environment? Yes    Have you ever done Advance Care Planning? (For example, a Health Directive, POLST, or a discussion with a medical provider or your loved ones about your wishes): No, advance care planning information given to patient to review.  Patient declined advance care planning discussion at this time.    Additional concerns to address?  No    Hypertension -- low when giving blood (gives three times per year).  Tolerating anti-hypertensive regimen.  Denies dizziness, lightheadedness, shortness of breath, chest pain.    Hyperlipidemia -- tolerating statin.  Granddaughter cooking healthfully for her (and taking some credit for excellent cholesterol " levels).    Hypothyroidism - normal energy, bowel regimen excellent.      Fall risk: 0  Fallen 2 or more times in the past year?: No  Any fall with injury in the past year?: No    Cognitive Screenin) Repeat 3 items (Leader, Season, Table)    2) Clock draw: NORMAL  3) 3 item recall: Recalls 3 objects  Results: 3 items recalled: COGNITIVE IMPAIRMENT LESS LIKELY, Normal Clock    Mini-CogTM Copyright LUIS Ya. Licensed by the author for use in [facility]; reprinted with permission (tr@[facility]). All rights reserved.      Do you have sleep apnea, excessive snoring or daytime drowsiness?: no    Reviewed and updated as needed this visit by clinical staff  Tobacco  Allergies  Meds  Med Hx  Surg Hx  Fam Hx  Soc Hx        Reviewed and updated as needed this visit by Provider        Social History     Tobacco Use     Smoking status: Former Smoker     Packs/day: 1.00     Years: 30.00     Pack years: 30.00     Types: Cigarettes     Start date: 1965     Last attempt to quit: 2011     Years since quittin.4     Smokeless tobacco: Never Used   Substance Use Topics     Alcohol use: Yes     Frequency: 2-3 times a week     Drinks per session: 1 or 2     Comment: jessika and water 3-4x/week                           Current providers sharing in care for this patient include:   Patient Care Team:  Sanjeev Bray MD as PCP - General (Internal Medicine)  Sanjeev Bray MD as Assigned PCP    The following health maintenance items are reviewed in Epic and correct as of today:  Health Maintenance   Topic Date Due     ZOSTER IMMUNIZATION (1 of 2) 1988     FALL RISK ASSESSMENT  2020     EYE EXAM  2020     MEDICARE ANNUAL WELLNESS VISIT  2020     INFLUENZA VACCINE (1) 2020     LIPID  2021     ADVANCE CARE PLANNING  2022     DTAP/TDAP/TD IMMUNIZATION (3 - Td) 2026     DEXA  Completed     PHQ-2  Completed     PNEUMOCOCCAL IMMUNIZATION 65+ LOW/MEDIUM RISK  Completed  "    IPV IMMUNIZATION  Aged Out     MENINGITIS IMMUNIZATION  Aged Out       ROS:  Constitutional, HEENT, cardiovascular, pulmonary, GI, , musculoskeletal, neuro, skin, endocrine and psych systems are negative, except as otherwise noted.    OBJECTIVE:   BP (!) 153/83   Pulse 70   Temp 97.7  F (36.5  C) (Tympanic)   Resp 20   Ht 1.6 m (5' 3\")   Wt 68.4 kg (150 lb 12.8 oz)   SpO2 95%   Breastfeeding No   BMI 26.71 kg/m   Estimated body mass index is 26.71 kg/m  as calculated from the following:    Height as of this encounter: 1.6 m (5' 3\").    Weight as of this encounter: 68.4 kg (150 lb 12.8 oz).  EXAM:   GENERAL APPEARANCE: healthy, alert and no distress  EYES: Eyes grossly normal to inspection, PERRL and conjunctivae and sclerae normal  HENT: ear canals and TM's normal, nose and mouth without ulcers or lesions, oropharynx clear and oral mucous membranes moist  NECK: no adenopathy, no asymmetry, masses, or scars and thyroid normal to palpation  RESP: lungs clear to auscultation - no rales, rhonchi or wheezes  BREAST: normal without masses, tenderness or nipple discharge and no palpable axillary masses or adenopathy  CV: regular rate and rhythm, normal S1 S2, no S3 or S4, no murmur, click or rub, no peripheral edema and peripheral pulses strong  ABDOMEN: soft, nontender, no hepatosplenomegaly, no masses and bowel sounds normal  MS: no musculoskeletal defects are noted and gait is age appropriate without ataxia  SKIN: no suspicious lesions or rashes  NEURO: Normal strength and tone, sensory exam grossly normal, mentation intact and speech normal  PSYCH: mentation appears normal and affect normal/bright    Diagnostic Test Results:  Labs reviewed in Epic    ASSESSMENT / PLAN:   1. Encounter for Medicare annual wellness exam  Doing great overall     2. Hypertension goal BP (blood pressure) < 140/90  Didn't take furosemide this AM (doesn't take when going out typically) and potassium normal.  Continue regimen.  " "Higher threshold for increasing anti-hypertensive regimen.  - lisinopril (ZESTRIL) 20 MG tablet; Take 1 tablet (20 mg) by mouth 2 times daily  Dispense: 180 tablet; Refill: 3  - furosemide (LASIX) 20 MG tablet; Take 1 tablet (20 mg) by mouth daily  Dispense: 90 tablet; Refill: 3  - felodipine ER (PLENDIL) 5 MG 24 hr tablet; Take 1 tablet (5 mg) by mouth daily  Dispense: 90 tablet; Refill: 3  - carvedilol (COREG) 12.5 MG tablet; Take 1 tablet (12.5 mg) by mouth 2 times daily (with meals)  Dispense: 180 tablet; Refill: 3    3. Hyperlipidemia LDL goal <130  Tolerating statin with excellent levels -- continue   - simvastatin (ZOCOR) 20 MG tablet; Take 1 tablet (20 mg) by mouth daily  Dispense: 90 tablet; Refill: 3    4. Other specified hypothyroidism  Clinically and chemically euthyroid - continue supplementation   - levothyroxine (SYNTHROID/LEVOTHROID) 75 MCG tablet; Take 1 tablet (75 mcg) by mouth daily  Dispense: 90 tablet; Refill: 3    5. Posterior vitreous detachment, right  Chronic follow-up - management:  - OPTOMETRY REFERRAL    COUNSELING:  Reviewed preventive health counseling, as reflected in patient instructions       Regular exercise       Healthy diet/nutrition       Advanced Planning     Estimated body mass index is 26.71 kg/m  as calculated from the following:    Height as of this encounter: 1.6 m (5' 3\").    Weight as of this encounter: 68.4 kg (150 lb 12.8 oz).         reports that she quit smoking about 9 years ago. Her smoking use included cigarettes. She started smoking about 55 years ago. She has a 30.00 pack-year smoking history. She has never used smokeless tobacco.      Appropriate preventive services were discussed with this patient, including applicable screening as appropriate for cardiovascular disease, diabetes, osteopenia/osteoporosis, and glaucoma.  As appropriate for age/gender, discussed screening for colorectal cancer, prostate cancer, breast cancer, and cervical cancer. Checklist " reviewing preventive services available has been given to the patient.    Reviewed patients plan of care and provided an AVS. The Basic Care Plan (routine screening as documented in Health Maintenance) for Claire meets the Care Plan requirement. This Care Plan has been established and reviewed with the Patient.    Counseling Resources:  ATP IV Guidelines  Pooled Cohorts Equation Calculator  Breast Cancer Risk Calculator  FRAX Risk Assessment  ICSI Preventive Guidelines  Dietary Guidelines for Americans, 2010  USDA's MyPlate  ASA Prophylaxis  Lung CA Screening    Sanjeev Bray MD  [facility]

## 2020-07-08 NOTE — PATIENT INSTRUCTIONS
Patient Education   Personalized Prevention Plan  You are due for the preventive services outlined below.  Your care team is available to assist you in scheduling these services.  If you have already completed any of these items, please share that information with your care team to update in your medical record.  Health Maintenance Due   Topic Date Due     Zoster (Shingles) Vaccine (1 of 2) 07/07/1988     FALL RISK ASSESSMENT  06/25/2020     Eye Exam  07/01/2020     Annual Wellness Visit  06/25/2020

## 2020-09-30 ENCOUNTER — OFFICE VISIT (OUTPATIENT)
Dept: OPHTHALMOLOGY | Facility: CLINIC | Age: 80
End: 2020-09-30
Payer: COMMERCIAL

## 2020-09-30 DIAGNOSIS — Z86.69 HISTORY OF RETINAL DETACHMENT: ICD-10-CM

## 2020-09-30 DIAGNOSIS — H52.4 PRESBYOPIA: ICD-10-CM

## 2020-09-30 DIAGNOSIS — H40.051 OCULAR HYPERTENSION OF RIGHT EYE: ICD-10-CM

## 2020-09-30 DIAGNOSIS — Z01.00 EXAMINATION OF EYES AND VISION: Primary | ICD-10-CM

## 2020-09-30 DIAGNOSIS — H44.522 BLIND HYPOTENSIVE EYE, LEFT: ICD-10-CM

## 2020-09-30 DIAGNOSIS — H43.811 POSTERIOR VITREOUS DETACHMENT, RIGHT: ICD-10-CM

## 2020-09-30 DIAGNOSIS — Z96.1 PSEUDOPHAKIA: ICD-10-CM

## 2020-09-30 PROCEDURE — 92015 DETERMINE REFRACTIVE STATE: CPT | Performed by: OPHTHALMOLOGY

## 2020-09-30 PROCEDURE — 92014 COMPRE OPH EXAM EST PT 1/>: CPT | Performed by: OPHTHALMOLOGY

## 2020-09-30 ASSESSMENT — VISUAL ACUITY
CORRECTION_TYPE: GLASSES
OS_CC: NLP
METHOD: SNELLEN - LINEAR
OD_CC: 20/25
OD_CC: J2

## 2020-09-30 ASSESSMENT — REFRACTION_MANIFEST
OD_AXIS: 077
OD_CYLINDER: +0.25
OS_ADD: +3.00
OD_SPHERE: -0.25
OD_ADD: +3.00
OS_SPHERE: BALANCE

## 2020-09-30 ASSESSMENT — TONOMETRY
OD_IOP_MMHG: 16
IOP_METHOD: APPLANATION
OS_IOP_MMHG: 09

## 2020-09-30 ASSESSMENT — CONF VISUAL FIELD
OD_NORMAL: 1
OS_SUPERIOR_TEMPORAL_RESTRICTION: 1
METHOD: COUNTING FINGERS
OS_INFERIOR_NASAL_RESTRICTION: 1
OS_INFERIOR_TEMPORAL_RESTRICTION: 1
OS_SUPERIOR_NASAL_RESTRICTION: 1

## 2020-09-30 ASSESSMENT — REFRACTION_WEARINGRX
OD_AXIS: 056
OS_ADD: +3.00
OS_CYLINDER: SPHERE
OS_SPHERE: PLANO
OD_CYLINDER: +0.50
OD_SPHERE: PLANO
OD_ADD: +3.00
SPECS_TYPE: PAL

## 2020-09-30 ASSESSMENT — CUP TO DISC RATIO: OD_RATIO: 0.3

## 2020-09-30 ASSESSMENT — EXTERNAL EXAM - RIGHT EYE: OD_EXAM: 2+ BROW PTOSIS

## 2020-09-30 ASSESSMENT — EXTERNAL EXAM - LEFT EYE: OS_EXAM: 2+ BROW PTOSIS

## 2020-09-30 ASSESSMENT — SLIT LAMP EXAM - LIDS
COMMENTS: 1+ DERMATOCHALASIS - UPPER LID
COMMENTS: 1+ DERMATOCHALASIS - UPPER LID

## 2020-09-30 NOTE — PROGRESS NOTES
Current Eye Medications:  Artificial tears as needed      Subjective:  Complete exam: both eyes doing well.  No new issues or concerns. She states you advised her to use artificial tears but she is having no problems.     Objective:  See Ophthalmology Exam.       Assessment:  Stable eye exam in patient who is functionally monocular.      ICD-10-CM    1. Examination of eyes and vision  Z01.00 EYE EXAM (SIMPLE-NONBILLABLE)   2. Presbyopia  H52.4 REFRACTIVE STATUS   3. Pseudophakia, od  Z96.1    4. 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 History of retinal detachment, od  Z86.69    7. Posterior vitreous detachment, right  H43.811         Plan:  Glasses Rx given - optional  Use artificial tears up to 4 times daily both eyes.  (Refresh Tears, Systane Ultra/Balance, or Theratears)  Possible clouding of posterior capsule right eye  discussed.  Continue observation with regard to glaucoma suspect status.  Call in May 2021 for an appointment in September 2021 for Complete Exam.    Dr. Jesus [phone]

## 2020-09-30 NOTE — LETTER
9/30/2020         RE: Claire Moore  [address]  [place] [address]        Dear Colleague,    Thank you for referring your patient, Claire Moore, to the [facility]. Please see a copy of my visit note below.     Current Eye Medications:  Artificial tears as needed      Subjective:  Complete exam: both eyes doing well.  No new issues or concerns. She states you advised her to use artificial tears but she is having no problems.     Objective:  See Ophthalmology Exam.       Assessment:  Stable eye exam in patient who is functionally monocular.      ICD-10-CM    1. Examination of eyes and vision  Z01.00 EYE EXAM (SIMPLE-NONBILLABLE)   2. Presbyopia  H52.4 REFRACTIVE STATUS   3. Pseudophakia, od  Z96.1    4. 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 History of retinal detachment, od  Z86.69    7. Posterior vitreous detachment, right  H43.811         Plan:  Glasses Rx given - optional  Use artificial tears up to 4 times daily both eyes.  (Refresh Tears, Systane Ultra/Balance, or Theratears)  Possible clouding of posterior capsule right eye  discussed.  Continue observation with regard to glaucoma suspect status.  Call in May 2021 for an appointment in September 2021 for Complete Exam.    Dr. Jesus [phone]             Again, thank you for allowing me to participate in the care of your patient.        Sincerely,        Shemar Jesus MD

## 2020-09-30 NOTE — PATIENT INSTRUCTIONS
Glasses Rx given - optional  Use artificial tears up to 4 times daily both eyes.  (Refresh Tears, Systane Ultra/Balance, or Theratears)  Possible clouding of posterior capsule right eye  discussed.  Continue observation with regard to glaucoma suspect status.  Call in May 2021 for an appointment in September 2021 for Complete Exam.    Dr. Jesus [phone]

## 2021-05-03 ENCOUNTER — TRANSFERRED RECORDS (OUTPATIENT)
Dept: HEALTH INFORMATION MANAGEMENT | Facility: CLINIC | Age: 81
End: 2021-05-03

## 2021-07-13 DIAGNOSIS — E78.5 HYPERLIPIDEMIA LDL GOAL <130: ICD-10-CM

## 2021-07-14 ENCOUNTER — LAB (OUTPATIENT)
Dept: LAB | Facility: CLINIC | Age: 81
End: 2021-07-14
Payer: COMMERCIAL

## 2021-07-14 ENCOUNTER — DOCUMENTATION ONLY (OUTPATIENT)
Dept: FAMILY MEDICINE | Facility: CLINIC | Age: 81
End: 2021-07-14

## 2021-07-14 DIAGNOSIS — I10 HYPERTENSION GOAL BP (BLOOD PRESSURE) < 140/90: ICD-10-CM

## 2021-07-14 DIAGNOSIS — E78.5 HYPERLIPIDEMIA LDL GOAL <130: ICD-10-CM

## 2021-07-14 DIAGNOSIS — E03.8 OTHER SPECIFIED HYPOTHYROIDISM: ICD-10-CM

## 2021-07-14 DIAGNOSIS — E78.5 HYPERLIPIDEMIA LDL GOAL <130: Primary | ICD-10-CM

## 2021-07-14 LAB
ANION GAP SERPL CALCULATED.3IONS-SCNC: 2 MMOL/L (ref 3–14)
BUN SERPL-MCNC: 23 MG/DL (ref 7–30)
CALCIUM SERPL-MCNC: 9.4 MG/DL (ref 8.5–10.1)
CHLORIDE BLD-SCNC: 105 MMOL/L
CHOLEST SERPL-MCNC: 150 MG/DL
CO2 SERPL-SCNC: 30 MMOL/L (ref 20–32)
CREAT SERPL-MCNC: 0.75 MG/DL
FASTING STATUS PATIENT QL REPORTED: NORMAL
GFR SERPL CREATININE-BSD FRML MDRD: 74 ML/MIN/1.73M2
GLUCOSE BLD-MCNC: 100 MG/DL (ref 70–99)
HDLC SERPL-MCNC: 76 MG/DL
HOLD SPECIMEN: NORMAL
LDLC SERPL CALC-MCNC: 59 MG/DL
NONHDLC SERPL-MCNC: 74 MG/DL
POTASSIUM BLD-SCNC: 4.9 MMOL/L (ref 3.4–5.3)
SODIUM SERPL-SCNC: 137 MMOL/L (ref 133–144)
TRIGL SERPL-MCNC: 76 MG/DL
TSH SERPL DL<=0.005 MIU/L-ACNC: 3.49 MU/L (ref 0.4–4)

## 2021-07-14 PROCEDURE — 80061 LIPID PANEL: CPT

## 2021-07-14 PROCEDURE — 80048 BASIC METABOLIC PNL TOTAL CA: CPT

## 2021-07-14 PROCEDURE — 36415 COLL VENOUS BLD VENIPUNCTURE: CPT

## 2021-07-14 PROCEDURE — 84443 ASSAY THYROID STIM HORMONE: CPT

## 2021-07-14 RX ORDER — SIMVASTATIN 20 MG
20 TABLET ORAL DAILY
Qty: 90 TABLET | Refills: 0 | Status: SHIPPED | OUTPATIENT
Start: 2021-07-14 | End: 2021-07-21

## 2021-07-21 ENCOUNTER — TELEPHONE (OUTPATIENT)
Dept: FAMILY MEDICINE | Facility: CLINIC | Age: 81
End: 2021-07-21

## 2021-07-21 ENCOUNTER — OFFICE VISIT (OUTPATIENT)
Dept: FAMILY MEDICINE | Facility: CLINIC | Age: 81
End: 2021-07-21
Payer: COMMERCIAL

## 2021-07-21 VITALS
OXYGEN SATURATION: 95 % | HEIGHT: 62 IN | WEIGHT: 145.6 LBS | DIASTOLIC BLOOD PRESSURE: 63 MMHG | RESPIRATION RATE: 20 BRPM | SYSTOLIC BLOOD PRESSURE: 129 MMHG | TEMPERATURE: 97.6 F | HEART RATE: 67 BPM | BODY MASS INDEX: 26.79 KG/M2

## 2021-07-21 DIAGNOSIS — Z00.00 ENCOUNTER FOR MEDICARE ANNUAL WELLNESS EXAM: Primary | ICD-10-CM

## 2021-07-21 DIAGNOSIS — I10 HYPERTENSION GOAL BP (BLOOD PRESSURE) < 140/90: ICD-10-CM

## 2021-07-21 DIAGNOSIS — E78.5 HYPERLIPIDEMIA LDL GOAL <130: ICD-10-CM

## 2021-07-21 DIAGNOSIS — E03.8 OTHER SPECIFIED HYPOTHYROIDISM: ICD-10-CM

## 2021-07-21 PROCEDURE — G0438 PPPS, INITIAL VISIT: HCPCS | Performed by: PHYSICIAN ASSISTANT

## 2021-07-21 PROCEDURE — 99214 OFFICE O/P EST MOD 30 MIN: CPT | Mod: 25 | Performed by: PHYSICIAN ASSISTANT

## 2021-07-21 RX ORDER — FELODIPINE 5 MG/1
5 TABLET, EXTENDED RELEASE ORAL DAILY
Qty: 90 TABLET | Refills: 3 | Status: SHIPPED | OUTPATIENT
Start: 2021-07-21 | End: 2021-07-21

## 2021-07-21 RX ORDER — FUROSEMIDE 20 MG
20 TABLET ORAL DAILY
Qty: 90 TABLET | Refills: 3 | Status: SHIPPED | OUTPATIENT
Start: 2021-07-21 | End: 2021-07-21

## 2021-07-21 RX ORDER — FUROSEMIDE 20 MG
20 TABLET ORAL DAILY
Qty: 90 TABLET | Refills: 3 | Status: SHIPPED | OUTPATIENT
Start: 2021-07-21 | End: 2022-08-01

## 2021-07-21 RX ORDER — CARVEDILOL 12.5 MG/1
12.5 TABLET ORAL 2 TIMES DAILY WITH MEALS
Qty: 180 TABLET | Refills: 3 | Status: SHIPPED | OUTPATIENT
Start: 2021-07-21 | End: 2022-07-13

## 2021-07-21 RX ORDER — LEVOTHYROXINE SODIUM 75 UG/1
75 TABLET ORAL DAILY
Qty: 90 TABLET | Refills: 3 | Status: SHIPPED | OUTPATIENT
Start: 2021-07-21 | End: 2022-08-01

## 2021-07-21 RX ORDER — SIMVASTATIN 20 MG
20 TABLET ORAL DAILY
Qty: 90 TABLET | Refills: 0 | Status: SHIPPED | OUTPATIENT
Start: 2021-07-21 | End: 2021-12-30

## 2021-07-21 RX ORDER — LISINOPRIL 20 MG/1
20 TABLET ORAL 2 TIMES DAILY
Qty: 180 TABLET | Refills: 3 | Status: SHIPPED | OUTPATIENT
Start: 2021-07-21 | End: 2022-08-01

## 2021-07-21 RX ORDER — LEVOTHYROXINE SODIUM 75 UG/1
75 TABLET ORAL DAILY
Qty: 90 TABLET | Refills: 3 | Status: SHIPPED | OUTPATIENT
Start: 2021-07-21 | End: 2021-07-21

## 2021-07-21 RX ORDER — LISINOPRIL 20 MG/1
20 TABLET ORAL 2 TIMES DAILY
Qty: 180 TABLET | Refills: 3 | Status: SHIPPED | OUTPATIENT
Start: 2021-07-21 | End: 2021-07-21

## 2021-07-21 RX ORDER — SIMVASTATIN 20 MG
20 TABLET ORAL DAILY
Qty: 90 TABLET | Refills: 0 | Status: SHIPPED | OUTPATIENT
Start: 2021-07-21 | End: 2021-07-21

## 2021-07-21 RX ORDER — FELODIPINE 5 MG/1
5 TABLET, EXTENDED RELEASE ORAL DAILY
Qty: 90 TABLET | Refills: 3 | Status: SHIPPED | OUTPATIENT
Start: 2021-07-21 | End: 2022-08-01

## 2021-07-21 RX ORDER — CARVEDILOL 12.5 MG/1
12.5 TABLET ORAL 2 TIMES DAILY WITH MEALS
Qty: 180 TABLET | Refills: 3 | Status: SHIPPED | OUTPATIENT
Start: 2021-07-21 | End: 2021-07-21

## 2021-07-21 ASSESSMENT — MIFFLIN-ST. JEOR: SCORE: 1068.69

## 2021-07-21 NOTE — PROGRESS NOTES
Electronic prescription failure notice. All medications that were sent in this encounter did not go through. Please resend.     Thank you,  Beatris Loya RN

## 2021-07-21 NOTE — TELEPHONE ENCOUNTER
E-Prescribing Status: Transmission to pharmacy failed (7/21/2021  9:13 AM CDT)    These did not go through from patient's office visit today.   Allergy prevents RN from sending.     Thank you,  Beatris Loya RN

## 2021-07-21 NOTE — PROGRESS NOTES
"  SUBJECTIVE:   Claire Moore is a 81 year old female who presents for Preventive Visit.      Patient has been advised of split billing requirements and indicates understanding: Yes  Are you in the first 12 months of your Medicare Part B coverage?  No    Physical Health:    In general, how would you rate your overall physical health? good    Outside of work, how many days during the week do you exercise? none    Outside of work, approximately how many minutes a day do you exercise?not applicable    If you drink alcohol do you typically have >3 drinks per day or >7 drinks per week? Not Applicable    Do you usually eat at least 4 servings of fruit and vegetables a day, include whole grains & fiber and avoid regularly eating high fat or \"junk\" foods? Yes    Do you have any problems taking medications regularly?  No    Do you have any side effects from medications? none    Needs assistance for the following daily activities: no assistance needed    Which of the following safety concerns are present in your home?  none identified     Hearing impairment: No    In the past 6 months, have you been bothered by leaking of urine? no    Mental Health:    In general, how would you rate your overall mental or emotional health? good  PHQ-2 Score:  0    Do you feel safe in your environment? Yes    Have you ever done Advance Care Planning? (For example, a Health Directive, POLST, or a discussion with a medical provider or your loved ones about your wishes): No, advance care planning information given to patient to review.  Patient plans to discuss their wishes with loved ones or provider.      Additional concerns to address?  Reviewed recent labs. Recheck of her htn, hypothyroidism , hyperlipidemia. Watching diet. Active, exercising with weights . No chest pain/sob/palps     Fall risk:  Fallen 2 or more times in the past year?: No  Any fall with injury in the past year?: No    Cognitive Screenin) Repeat 3 items (Leader, " Season, Table)    2) Clock draw: NORMAL  3) 3 item recall: Recalls 3 objects  Results: 3 items recalled: COGNITIVE IMPAIRMENT LESS LIKELY    Mini-CogTM Copyright LUIS Ya. Licensed by the author for use in [facility]; reprinted with permission (tr@[facility]). All rights reserved.      Do you have sleep apnea, excessive snoring or daytime drowsiness?: no      Reviewed and updated as needed this visit by clinical staff  Tobacco  Allergies  Meds   Med Hx  Surg Hx  Fam Hx  Soc Hx        Reviewed and updated as needed this visit by Provider                Social History     Tobacco Use     Smoking status: Former Smoker     Packs/day: 1.00     Years: 30.00     Pack years: 30.00     Types: Cigarettes     Start date: 1/1/1965     Quit date: 2/1/2011     Years since quitting: 10.4     Smokeless tobacco: Never Used   Substance Use Topics     Alcohol use: Yes     Comment: jessika and water 3-4x/week                           Current providers sharing in care for this patient include:   Patient Care Team:  Sanjeev Bray MD as PCP - General (Internal Medicine)  Saurabh Hill MD as Assigned Surgical Provider  Kehr, Kristen M, PA-C as Assigned PCP    The following health maintenance items are reviewed in Epic and correct as of today:  Health Maintenance   Topic Date Due     COVID-19 Vaccine (1) Never done     ZOSTER IMMUNIZATION (1 of 2) Never done     FALL RISK ASSESSMENT  07/08/2021     INFLUENZA VACCINE (1) 09/01/2021     BMP  07/14/2022     LIPID  07/14/2022     MEDICARE ANNUAL WELLNESS VISIT  07/21/2022     ANNUAL REVIEW OF HM ORDERS  07/21/2022     DTAP/TDAP/TD IMMUNIZATION (3 - Td or Tdap) 05/18/2026     ADVANCE CARE PLANNING  07/21/2026     DEXA  04/26/2027     PHQ-2  Completed     Pneumococcal Vaccine: 65+ Years  Completed     IPV IMMUNIZATION  Aged Out     MENINGITIS IMMUNIZATION  Aged Out     HEPATITIS B IMMUNIZATION  Aged Out     Lab work is in process  Labs reviewed in EPIC  BP Readings from  Last 3 Encounters:   07/21/21 129/63   07/08/20 139/81   02/18/20 (!) 187/99    Wt Readings from Last 3 Encounters:   07/21/21 66 kg (145 lb 9.6 oz)   07/08/20 68.4 kg (150 lb 12.8 oz)   02/18/20 71.2 kg (157 lb)                  Patient Active Problem List   Diagnosis     White coat syndrome with hypertension     Hyperlipidemia LDL goal <130     Advanced directives, counseling/discussion     DJD (degenerative joint disease)     Pseudophakia, od     Blind hypotensive eye, left     History of retinal detachment, od     Posterior vitreous detachment, right     Other specified hypothyroidism     Appendicitis     S/P laparoscopic appendectomy     Ocular hypertension of right eye     Past Surgical History:   Procedure Laterality Date     CATARACT IOL, RT/LT       COLPORRHAPHY ANTERIOR N/A 3/30/2017    Procedure: COLPORRHAPHY ANTERIOR;  Surgeon: Rene Mitchell MD;  Location: [facility] OR     CYSTOSCOPY, SLING TRANSVAGINAL N/A 3/30/2017    Procedure: CYSTOSCOPY, SLING TRANSVAGINAL;  Surgeon: Rene Mitchell MD;  Location: [facility] OR     D & C       LAPAROSCOPIC APPENDECTOMY N/A 2/8/2018    Procedure: LAPAROSCOPIC APPENDECTOMY;  Laparoscopic appendectomy;  Surgeon: Glenn Pacheco MD;  Location: [facility]     PHACOEMULSIFICATION WITH STANDARD INTRAOCULAR LENS IMPLANT  5/2015    right eye     RETINAL REATTACHMENT  remotely    right eye     TONSILLECTOMY       TUBAL LIGATION         Social History     Tobacco Use     Smoking status: Former Smoker     Packs/day: 1.00     Years: 30.00     Pack years: 30.00     Types: Cigarettes     Start date: 1/1/1965     Quit date: 2/1/2011     Years since quitting: 10.4     Smokeless tobacco: Never Used   Substance Use Topics     Alcohol use: Yes     Comment: jessika and water 3-4x/week     Family History   Problem Relation Age of Onset     Alzheimer Disease Mother      Osteoporosis Mother      Hypertension Mother      Prostate Cancer Father      Osteoporosis Maternal Grandmother      Cancer  "Brother      Eye Disorder Brother      Eye Disorder Sister      Eye Disorder Son      Eye Disorder Brother      Eye Disorder Brother      Cerebrovascular Disease Paternal Grandmother      Diabetes No family hx of      Thyroid Disease No family hx of      Glaucoma No family hx of      Macular Degeneration No family hx of          Current Outpatient Medications   Medication Sig Dispense Refill     CALCIUM 500 OR 1 per day        carvedilol (COREG) 12.5 MG tablet Take 1 tablet (12.5 mg) by mouth 2 times daily (with meals) 180 tablet 3     cholecalciferol (VITAMIN D) 1000 UNIT tablet Take 1 tablet by mouth daily.       felodipine ER (PLENDIL) 5 MG 24 hr tablet Take 1 tablet (5 mg) by mouth daily 90 tablet 3     furosemide (LASIX) 20 MG tablet Take 1 tablet (20 mg) by mouth daily 90 tablet 3     levothyroxine (SYNTHROID/LEVOTHROID) 75 MCG tablet Take 1 tablet (75 mcg) by mouth daily 90 tablet 3     lisinopril (ZESTRIL) 20 MG tablet Take 1 tablet (20 mg) by mouth 2 times daily 180 tablet 3     Multiple Vitamin (MULTI-DAY VITAMINS PO) Take  by mouth. 1 tab daily       simvastatin (ZOCOR) 20 MG tablet Take 1 tablet (20 mg) by mouth daily 90 tablet 0     Allergies   Allergen Reactions     Amlodipine Swelling     Side effect / swelling     Hctz [Hydrochlorothiazide]      Low sodium       Recent Labs   Lab Test 07/14/21  1503 06/24/20  0824 07/11/19  0852 06/18/19  0848 05/24/13  0752   LDL 59 63  --  62 64   HDL 76 79  --  66 55   TRIG 76 160*  --  154* 96   ALT  --   --   --   --  32   CR 0.75 0.83 0.88 0.79 0.77   GFRESTIMATED 74 66 62 70 73   GFRESTBLACK  --  76 72 81 89   POTASSIUM 4.9 4.5 5.1 4.3 3.7   TSH 3.49 5.50*  --  2.77 2.32          ROS:  Constitutional, HEENT, cardiovascular, pulmonary, GI, , musculoskeletal, neuro, skin, endocrine and psych systems are negative, except as otherwise noted.    OBJECTIVE:   /63   Pulse 67   Temp 97.6  F (36.4  C) (Tympanic)   Resp 20   Ht 1.575 m (5' 2\")   Wt 66 kg " "(145 lb 9.6 oz)   SpO2 95%   Breastfeeding No   BMI 26.63 kg/m   Estimated body mass index is 26.63 kg/m  as calculated from the following:    Height as of this encounter: 1.575 m (5' 2\").    Weight as of this encounter: 66 kg (145 lb 9.6 oz).  EXAM:   GENERAL: healthy, alert and no distress  EYES: Eyes grossly normal to inspection, PERRL and conjunctivae and sclerae normal  HENT: ear canals and TM's normal, nose and mouth without ulcers or lesions  NECK: no adenopathy, no asymmetry, masses, or scars and thyroid normal to palpation  RESP: lungs clear to auscultation - no rales, rhonchi or wheezes  CV: regular rate and rhythm, normal S1 S2, no S3 or S4, no murmur, click or rub, no peripheral edema and peripheral pulses strong  ABDOMEN: soft, nontender, no hepatosplenomegaly, no masses and bowel sounds normal  MS: no gross musculoskeletal defects noted, no edema  SKIN: no suspicious lesions or rashes  NEURO: Normal strength and tone, mentation intact and speech normal  PSYCH: mentation appears normal, affect normal/bright    Diagnostic Test Results:  Labs reviewed in Epic    ASSESSMENT / PLAN:       ICD-10-CM    1. Encounter for Medicare annual wellness exam  Z00.00    2. Other specified hypothyroidism  E03.8 levothyroxine (SYNTHROID/LEVOTHROID) 75 MCG tablet   3. Hypertension goal BP (blood pressure) < 140/90  I10 carvedilol (COREG) 12.5 MG tablet     lisinopril (ZESTRIL) 20 MG tablet     furosemide (LASIX) 20 MG tablet     felodipine ER (PLENDIL) 5 MG 24 hr tablet   4. Hyperlipidemia LDL goal <130  E78.5 simvastatin (ZOCOR) 20 MG tablet   work on lifestyle modification  Continue current meds.     Patient has been advised of split billing requirements and indicates understanding: Yes    COUNSELING:  Reviewed preventive health counseling, as reflected in patient instructions       Regular exercise       Healthy diet/nutrition    Estimated body mass index is 26.63 kg/m  as calculated from the following:    Height as " "of this encounter: 1.575 m (5' 2\").    Weight as of this encounter: 66 kg (145 lb 9.6 oz).        She reports that she quit smoking about 10 years ago. Her smoking use included cigarettes. She started smoking about 56 years ago. She has a 30.00 pack-year smoking history. She has never used smokeless tobacco.    Appropriate preventive services were discussed with this patient, including applicable screening as appropriate for cardiovascular disease, diabetes, osteopenia/osteoporosis, and glaucoma.  As appropriate for age/gender, discussed screening for colorectal cancer, prostate cancer, breast cancer, and cervical cancer. Checklist reviewing preventive services available has been given to the patient.    Reviewed patients plan of care and provided an AVS. The Basic Care Plan (routine screening as documented in Health Maintenance) for Claire meets the Care Plan requirement. This Care Plan has been established and reviewed with the Patient.    Counseling Resources:  ATP IV Guidelines  Pooled Cohorts Equation Calculator  Breast Cancer Risk Calculator  BRCA-Related Cancer Risk Assessment: FHS-7 Tool  FRAX Risk Assessment  ICSI Preventive Guidelines  Dietary Guidelines for Americans, 2010  USDA's MyPlate  ASA Prophylaxis  Lung CA Screening    JAMIE Garcia [facility] CHERYL  "

## 2021-07-21 NOTE — PATIENT INSTRUCTIONS
Patient Education   Personalized Prevention Plan  You are due for the preventive services outlined below.  Your care team is available to assist you in scheduling these services.  If you have already completed any of these items, please share that information with your care team to update in your medical record.  Health Maintenance Due   Topic Date Due     ANNUAL REVIEW OF HM ORDERS  Never done     COVID-19 Vaccine (1) Never done     Zoster (Shingles) Vaccine (1 of 2) Never done     PHQ-2  01/01/2021     FALL RISK ASSESSMENT  07/08/2021     Annual Wellness Visit  07/08/2021

## 2021-10-04 ENCOUNTER — OFFICE VISIT (OUTPATIENT)
Dept: OPHTHALMOLOGY | Facility: CLINIC | Age: 81
End: 2021-10-04
Payer: COMMERCIAL

## 2021-10-04 DIAGNOSIS — H43.811 POSTERIOR VITREOUS DETACHMENT, RIGHT: ICD-10-CM

## 2021-10-04 DIAGNOSIS — Z01.01 ENCOUNTER FOR EXAMINATION OF EYES AND VISION WITH ABNORMAL FINDINGS: Primary | ICD-10-CM

## 2021-10-04 DIAGNOSIS — H52.4 PRESBYOPIA: ICD-10-CM

## 2021-10-04 DIAGNOSIS — H40.051 OCULAR HYPERTENSION OF RIGHT EYE: ICD-10-CM

## 2021-10-04 DIAGNOSIS — H44.522 BLIND HYPOTENSIVE EYE, LEFT: ICD-10-CM

## 2021-10-04 DIAGNOSIS — Z86.69 HISTORY OF RETINAL DETACHMENT: ICD-10-CM

## 2021-10-04 DIAGNOSIS — Z96.1 PSEUDOPHAKIA: ICD-10-CM

## 2021-10-04 PROCEDURE — 92014 COMPRE OPH EXAM EST PT 1/>: CPT | Performed by: OPHTHALMOLOGY

## 2021-10-04 PROCEDURE — 92015 DETERMINE REFRACTIVE STATE: CPT | Performed by: OPHTHALMOLOGY

## 2021-10-04 ASSESSMENT — REFRACTION_WEARINGRX
SPECS_TYPE: PAL
OS_CYLINDER: SPHERE
OD_SPHERE: PLANO
OD_ADD: +3.00
OD_AXIS: 056
OD_CYLINDER: +0.50
OS_ADD: +3.00
OS_SPHERE: PLANO

## 2021-10-04 ASSESSMENT — TONOMETRY
IOP_METHOD: APPLANATION
OS_IOP_MMHG: 08
OD_IOP_MMHG: 17

## 2021-10-04 ASSESSMENT — CONF VISUAL FIELD
OS_SUPERIOR_NASAL_RESTRICTION: 1
OS_SUPERIOR_TEMPORAL_RESTRICTION: 1
OS_INFERIOR_NASAL_RESTRICTION: 1
OS_INFERIOR_TEMPORAL_RESTRICTION: 1
OD_NORMAL: 1

## 2021-10-04 ASSESSMENT — VISUAL ACUITY
OS_CC: NLP
OD_CC: 20/30
OD_CC+: -2
METHOD: SNELLEN - LINEAR

## 2021-10-04 ASSESSMENT — REFRACTION_MANIFEST
OD_CYLINDER: +0.75
OD_ADD: +3.00
OS_SPHERE: PLANO
OD_AXIS: 055
OD_SPHERE: -0.75
OS_ADD: +3.00
OS_CYLINDER: SPHERE

## 2021-10-04 ASSESSMENT — EXTERNAL EXAM - LEFT EYE: OS_EXAM: 2+ BROW PTOSIS

## 2021-10-04 ASSESSMENT — EXTERNAL EXAM - RIGHT EYE: OD_EXAM: 2+ BROW PTOSIS

## 2021-10-04 ASSESSMENT — SLIT LAMP EXAM - LIDS
COMMENTS: 1+ DERMATOCHALASIS - UPPER LID
COMMENTS: 1+ DERMATOCHALASIS - UPPER LID

## 2021-10-04 ASSESSMENT — CUP TO DISC RATIO: OD_RATIO: 0.3

## 2021-10-04 NOTE — PATIENT INSTRUCTIONS
Possible clouding of posterior capsule right eye discussed.   Glasses Rx given - optional   May use artificial tears up to 4 times daily both eyes. (Refresh Tears, Systane Ultra/Balance, or Theratears)   Call in June 2022 for an appointment in October 2022 for Complete Exam    Dr. Jesus [phone]

## 2021-10-04 NOTE — LETTER
10/4/2021         RE: Claire Moore  [address]  [place] [address]        Dear Colleague,    Thank you for referring your patient, Claire Moore, to the [facility]. Please see a copy of my visit note below.     Current Eye Medications:  AT's - uses sometimes in the morning.      Subjective:  Here for complete eye exam. Vision is stable at distance and near. No eye pain or discomfort.      Objective:  See Ophthalmology Exam.       Assessment:  Posterior capsule opacity right eye approaching visual significance.      ICD-10-CM    1. Encounter for examination of eyes and vision with abnormal findings  Z01.01    2. Presbyopia  H52.4 REFRACTION   3. Pseudophakia, od  Z96.1 EYE EXAM (SIMPLE-NONBILLABLE)   4. 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 History of retinal detachment, od  Z86.69    6. 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 Posterior vitreous detachment, right  H43.811         Plan:  Possible clouding of posterior capsule right eye discussed.   Glasses Rx given - optional   May use artificial tears up to 4 times daily both eyes. (Refresh Tears, Systane Ultra/Balance, or Theratears)   Call in June 2022 for an appointment in October 2022 for Complete Exam    Dr. Jesus [phone]           Again, thank you for allowing me to participate in the care of your patient.        Sincerely,        Shemar Jesus MD

## 2021-10-04 NOTE — PROGRESS NOTES
Current Eye Medications:  AT's - uses sometimes in the morning.      Subjective:  Here for complete eye exam. Vision is stable at distance and near. No eye pain or discomfort.      Objective:  See Ophthalmology Exam.       Assessment:  Posterior capsule opacity right eye approaching visual significance.      ICD-10-CM    1. Encounter for examination of eyes and vision with abnormal findings  Z01.01    2. Presbyopia  H52.4 REFRACTION   3. Pseudophakia, od  Z96.1 EYE EXAM (SIMPLE-NONBILLABLE)   4. 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 History of retinal detachment, od  Z86.69    6. 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 Posterior vitreous detachment, right  H43.811         Plan:  Possible clouding of posterior capsule right eye discussed.   Glasses Rx given - optional   May use artificial tears up to 4 times daily both eyes. (Refresh Tears, Systane Ultra/Balance, or Theratears)   Call in June 2022 for an appointment in October 2022 for Complete Exam    Dr. Jesus [phone]

## 2021-12-29 DIAGNOSIS — E78.5 HYPERLIPIDEMIA LDL GOAL <130: ICD-10-CM

## 2021-12-30 RX ORDER — SIMVASTATIN 20 MG
TABLET ORAL
Qty: 90 TABLET | Refills: 0 | Status: SHIPPED | OUTPATIENT
Start: 2021-12-30 | End: 2022-04-07

## 2022-02-17 PROBLEM — Z71.89 ADVANCED DIRECTIVES, COUNSELING/DISCUSSION: Status: ACTIVE | Noted: 2017-06-12

## 2022-04-05 DIAGNOSIS — E78.5 HYPERLIPIDEMIA LDL GOAL <130: ICD-10-CM

## 2022-04-07 RX ORDER — SIMVASTATIN 20 MG
TABLET ORAL
Qty: 90 TABLET | Refills: 0 | Status: SHIPPED | OUTPATIENT
Start: 2022-04-07 | End: 2022-07-08

## 2022-07-06 DIAGNOSIS — E78.5 HYPERLIPIDEMIA LDL GOAL <130: ICD-10-CM

## 2022-07-08 RX ORDER — SIMVASTATIN 20 MG
TABLET ORAL
Qty: 90 TABLET | Refills: 0 | Status: SHIPPED | OUTPATIENT
Start: 2022-07-08 | End: 2022-10-05

## 2022-07-25 ENCOUNTER — LAB (OUTPATIENT)
Dept: LAB | Facility: CLINIC | Age: 82
End: 2022-07-25
Payer: COMMERCIAL

## 2022-07-25 DIAGNOSIS — Z13.220 SCREENING FOR HYPERLIPIDEMIA: ICD-10-CM

## 2022-07-25 DIAGNOSIS — I10 WHITE COAT SYNDROME WITH HYPERTENSION: ICD-10-CM

## 2022-07-25 DIAGNOSIS — E78.5 HYPERLIPIDEMIA LDL GOAL <130: ICD-10-CM

## 2022-07-25 LAB
ANION GAP SERPL CALCULATED.3IONS-SCNC: 6 MMOL/L (ref 3–14)
BUN SERPL-MCNC: 17 MG/DL (ref 7–30)
CALCIUM SERPL-MCNC: 9.7 MG/DL (ref 8.5–10.1)
CHLORIDE BLD-SCNC: 102 MMOL/L (ref 94–109)
CHOLEST SERPL-MCNC: 176 MG/DL
CO2 SERPL-SCNC: 29 MMOL/L (ref 20–32)
CREAT SERPL-MCNC: 0.59 MG/DL (ref 0.52–1.04)
FASTING STATUS PATIENT QL REPORTED: YES
GFR SERPL CREATININE-BSD FRML MDRD: 88 ML/MIN/1.73M2
GLUCOSE BLD-MCNC: 126 MG/DL (ref 70–99)
HDLC SERPL-MCNC: 105 MG/DL
LDLC SERPL CALC-MCNC: 54 MG/DL
NONHDLC SERPL-MCNC: 71 MG/DL
POTASSIUM BLD-SCNC: 4.2 MMOL/L (ref 3.4–5.3)
SODIUM SERPL-SCNC: 137 MMOL/L (ref 133–144)
TRIGL SERPL-MCNC: 84 MG/DL

## 2022-07-25 PROCEDURE — 80048 BASIC METABOLIC PNL TOTAL CA: CPT

## 2022-07-25 PROCEDURE — 36415 COLL VENOUS BLD VENIPUNCTURE: CPT

## 2022-07-25 PROCEDURE — 80061 LIPID PANEL: CPT

## 2022-08-01 ENCOUNTER — OFFICE VISIT (OUTPATIENT)
Dept: FAMILY MEDICINE | Facility: CLINIC | Age: 82
End: 2022-08-01
Payer: COMMERCIAL

## 2022-08-01 VITALS
BODY MASS INDEX: 25.42 KG/M2 | TEMPERATURE: 97.5 F | SYSTOLIC BLOOD PRESSURE: 132 MMHG | OXYGEN SATURATION: 95 % | HEART RATE: 82 BPM | RESPIRATION RATE: 18 BRPM | WEIGHT: 139 LBS | DIASTOLIC BLOOD PRESSURE: 64 MMHG

## 2022-08-01 DIAGNOSIS — I10 HYPERTENSION GOAL BP (BLOOD PRESSURE) < 140/90: ICD-10-CM

## 2022-08-01 DIAGNOSIS — Z00.00 ENCOUNTER FOR MEDICARE ANNUAL WELLNESS EXAM: Primary | ICD-10-CM

## 2022-08-01 DIAGNOSIS — R73.01 IMPAIRED FASTING GLUCOSE: ICD-10-CM

## 2022-08-01 DIAGNOSIS — E78.5 HYPERLIPIDEMIA LDL GOAL <130: ICD-10-CM

## 2022-08-01 DIAGNOSIS — E03.8 OTHER SPECIFIED HYPOTHYROIDISM: ICD-10-CM

## 2022-08-01 LAB
HBA1C MFR BLD: 5 % (ref 0–5.6)
TSH SERPL DL<=0.005 MIU/L-ACNC: 4.39 MU/L (ref 0.4–4)

## 2022-08-01 PROCEDURE — 84443 ASSAY THYROID STIM HORMONE: CPT | Performed by: PHYSICIAN ASSISTANT

## 2022-08-01 PROCEDURE — G0439 PPPS, SUBSEQ VISIT: HCPCS | Performed by: PHYSICIAN ASSISTANT

## 2022-08-01 PROCEDURE — 36415 COLL VENOUS BLD VENIPUNCTURE: CPT | Performed by: PHYSICIAN ASSISTANT

## 2022-08-01 PROCEDURE — 83036 HEMOGLOBIN GLYCOSYLATED A1C: CPT | Performed by: PHYSICIAN ASSISTANT

## 2022-08-01 PROCEDURE — 99214 OFFICE O/P EST MOD 30 MIN: CPT | Mod: 25 | Performed by: PHYSICIAN ASSISTANT

## 2022-08-01 RX ORDER — FELODIPINE 5 MG/1
5 TABLET, EXTENDED RELEASE ORAL DAILY
Qty: 90 TABLET | Refills: 3 | Status: SHIPPED | OUTPATIENT
Start: 2022-08-01 | End: 2023-08-02

## 2022-08-01 RX ORDER — FUROSEMIDE 20 MG
20 TABLET ORAL DAILY
Qty: 90 TABLET | Refills: 3 | Status: SHIPPED | OUTPATIENT
Start: 2022-08-01

## 2022-08-01 RX ORDER — LISINOPRIL 20 MG/1
20 TABLET ORAL 2 TIMES DAILY
Qty: 180 TABLET | Refills: 3 | Status: SHIPPED | OUTPATIENT
Start: 2022-08-01 | End: 2023-06-19

## 2022-08-01 RX ORDER — LEVOTHYROXINE SODIUM 75 UG/1
75 TABLET ORAL DAILY
Qty: 90 TABLET | Refills: 3 | Status: SHIPPED | OUTPATIENT
Start: 2022-08-01 | End: 2022-08-10

## 2022-08-01 ASSESSMENT — PAIN SCALES - GENERAL: PAINLEVEL: NO PAIN (0)

## 2022-08-01 ASSESSMENT — ACTIVITIES OF DAILY LIVING (ADL): CURRENT_FUNCTION: NO ASSISTANCE NEEDED

## 2022-08-01 NOTE — PROGRESS NOTES
"SUBJECTIVE:   Claire Moore is a 82 year old female who presents for Preventive Visit.    Patient has been advised of split billing requirements and indicates understanding: Yes  Are you in the first 12 months of your Medicare coverage?  No  Concerns:  Recheck of her htn and hyperlipidemia.  No chest pain/sob/palpitations/dizziness/ha's  Taking and tolerating meds.  Reviewed recent labs. Cholesterol numbers look good.  Fasting glucose was a little elevated.  Recheck of her hypothyroidism. No fatigue or constipation.    Healthy Habits:     In general, how would you rate your overall health?  Excellent    Frequency of exercise:  2-3 days/week    Duration of exercise:  15-30 minutes    Do you usually eat at least 4 servings of fruit and vegetables a day, include whole grains    & fiber and avoid regularly eating high fat or \"junk\" foods?  Yes    Taking medications regularly:  Yes    Medication side effects:  Not applicable    Ability to successfully perform activities of daily living:  No assistance needed    Home Safety:  No safety concerns identified    Hearing Impairment:  No hearing concerns    In the past 6 months, have you been bothered by leaking of urine?  No    In general, how would you rate your overall mental or emotional health?  Excellent      PHQ-2 Total Score: 0    Additional concerns today:  No    Do you feel safe in your environment? Yes    Have you ever done Advance Care Planning? (For example, a Health Directive, POLST, or a discussion with a medical provider or your loved ones about your wishes): Yes, advance care planning is on file.     Fall risk  Fallen 2 or more times in the past year?: No  Any fall with injury in the past year?: No    Cognitive Screening   *Declined- no concerns present    Do you have sleep apnea, excessive snoring or daytime drowsiness?: no    Reviewed and updated as needed this visit by clinical staff   Tobacco  Allergies    Med Hx  Surg Hx  Fam Hx  Soc Hx       Ally " "[facility] CMA    Reviewed and updated as needed this visit by Provider                   Social History     Tobacco Use     Smoking status: Former Smoker     Packs/day: 1.00     Years: 30.00     Pack years: 30.00     Types: Cigarettes     Start date: 1965     Quit date: 2011     Years since quittin.5     Smokeless tobacco: Never Used   Substance Use Topics     Alcohol use: Yes     Comment: jessika and water 3-4x/week     If you drink alcohol do you typically have >3 drinks per day or >7 drinks per week? No        Current providers sharing in care for this patient include:   Patient Care Team:  Aria Mullen as PCP - General  José Vora PA-C as Assigned PCP  Shemar Jesus MD as Assigned Surgical Provider    The following health maintenance items are reviewed in Epic and correct as of today:  Health Maintenance Due   Topic Date Due     COVID-19 Vaccine (1) Never done     ZOSTER IMMUNIZATION (1 of 2) Never done     BP Readings from Last 3 Encounters:   22 132/64   21 129/63   20 139/81    Wt Readings from Last 3 Encounters:   22 63 kg (139 lb)   21 66 kg (145 lb 9.6 oz)   20 68.4 kg (150 lb 12.8 oz)             Mammogram Screening - Mammography discussed and declined  Pertinent mammograms are reviewed under the imaging tab.    Review of Systems  Constitutional, HEENT, cardiovascular, pulmonary, GI, , musculoskeletal, neuro, skin, endocrine and psych systems are negative, except as otherwise noted.    OBJECTIVE:   /64   Pulse 82   Temp 97.5  F (36.4  C) (Tympanic)   Resp 18   Wt 63 kg (139 lb)   SpO2 95%   Breastfeeding No   BMI 25.42 kg/m   Estimated body mass index is 25.42 kg/m  as calculated from the following:    Height as of 21: 1.575 m (5' 2\").    Weight as of this encounter: 63 kg (139 lb).  Physical Exam  GENERAL APPEARANCE: healthy, alert and no distress  EYES: Eyes grossly normal to inspection, PERRL and conjunctivae and " "sclerae normal  HENT: ear canals and TM's normal, nose and mouth without ulcers or lesions, oropharynx clear and oral mucous membranes moist  NECK: no adenopathy, no asymmetry, masses, or scars and thyroid normal to palpation  RESP: lungs clear to auscultation - no rales, rhonchi or wheezes  BREAST: normal without masses, tenderness or nipple discharge and no palpable axillary masses or adenopathy  CV: regular rate and rhythm, normal S1 S2, no S3 or S4, no murmur, click or rub, no peripheral edema and peripheral pulses strong  ABDOMEN: soft, nontender, no hepatosplenomegaly, no masses and bowel sounds normal  MS: no musculoskeletal defects are noted and gait is age appropriate without ataxia  SKIN: no suspicious lesions or rashes  NEURO: Normal strength and tone, sensory exam grossly normal, mentation intact and speech normal  PSYCH: mentation appears normal and affect normal/bright    Diagnostic Test Results:  Labs reviewed in Epic    ASSESSMENT / PLAN:       ICD-10-CM    1. Encounter for Medicare annual wellness exam  Z00.00    2. Hypertension goal BP (blood pressure) < 140/90  I10 felodipine ER (PLENDIL) 5 MG 24 hr tablet     furosemide (LASIX) 20 MG tablet     lisinopril (ZESTRIL) 20 MG tablet   3. Hyperlipidemia LDL goal <130  E78.5    4. Other specified hypothyroidism  E03.8 TSH     levothyroxine (SYNTHROID/LEVOTHROID) 75 MCG tablet   5. Impaired fasting glucose  R73.01 Hemoglobin A1c   continue all current meds. Recheck in 1 year.   work on lifestyle modification      Patient has been advised of split billing requirements and indicates understanding: Yes    COUNSELING:  Reviewed preventive health counseling, as reflected in patient instructions       Regular exercise       Healthy diet/nutrition    Estimated body mass index is 25.42 kg/m  as calculated from the following:    Height as of 7/21/21: 1.575 m (5' 2\").    Weight as of this encounter: 63 kg (139 lb).        She reports that she quit smoking about 11 " years ago. Her smoking use included cigarettes. She started smoking about 57 years ago. She has a 30.00 pack-year smoking history. She has never used smokeless tobacco.      Appropriate preventive services were discussed with this patient, including applicable screening as appropriate for cardiovascular disease, diabetes, osteopenia/osteoporosis, and glaucoma.  As appropriate for age/gender, discussed screening for colorectal cancer, prostate cancer, breast cancer, and cervical cancer. Checklist reviewing preventive services available has been given to the patient.    Reviewed patients plan of care and provided an AVS. The Basic Care Plan (routine screening as documented in Health Maintenance) for Claire meets the Care Plan requirement. This Care Plan has been established and reviewed with the Patient.    Counseling Resources:  ATP IV Guidelines  Pooled Cohorts Equation Calculator  Breast Cancer Risk Calculator  Breast Cancer: Medication to Reduce Risk  FRAX Risk Assessment  ICSI Preventive Guidelines  Dietary Guidelines for Americans, 2010  USDA's MyPlate  ASA Prophylaxis  Lung CA Screening    JAMIE Garcia [facility] CHERYL    Identified Health Risks:

## 2022-08-01 NOTE — PATIENT INSTRUCTIONS
Patient Education   Personalized Prevention Plan  You are due for the preventive services outlined below.  Your care team is available to assist you in scheduling these services.  If you have already completed any of these items, please share that information with your care team to update in your medical record.  Health Maintenance Due   Topic Date Due    COVID-19 Vaccine (1) Never done    Zoster (Shingles) Vaccine (1 of 2) Never done    PHQ-2 (once per calendar year)  01/01/2022    ANNUAL REVIEW OF HM ORDERS  07/21/2022    FALL RISK ASSESSMENT  07/21/2022     Preventive Health Recommendations    See your health care provider every year to  Review health changes.   Discuss preventive care.    Review your medicines if your doctor has prescribed any.  You no longer need a yearly Pap test unless you've had an abnormal Pap test in the past 10 years. If you have vaginal symptoms, such as bleeding or discharge, be sure to talk with your provider about a Pap test.  Every 1 to 2 years, have a mammogram.  If you are over 69, talk with your health care provider about whether or not you want to continue having screening mammograms.  Every 10 years, have a colonoscopy. Or, have a yearly FIT test (stool test). These exams will check for colon cancer.   Have a cholesterol test every 5 years, or more often if your doctor advises it.   Have a diabetes test (fasting glucose) every three years. If you are at risk for diabetes, you should have this test more often.   At age 65, have a bone density scan (DEXA) to check for osteoporosis (brittle bone disease).    Shots:  Get a flu shot each year.  Get a tetanus shot every 10 years.  Talk to your doctor about your pneumonia vaccines. There are now two you should receive - Pneumovax (PPSV 23) and Prevnar (PCV 13).  Talk to your pharmacist about the shingles vaccine.  Talk to your doctor about the hepatitis B vaccine.    Nutrition:   Eat at least 5 servings of fruits and vegetables each  day.  Eat whole-grain bread, whole-wheat pasta and brown rice instead of white grains and rice.  Get adequate Calcium and Vitamin D.     Lifestyle  Exercise at least 150 minutes a week (30 minutes a day, 5 days a week). This will help you control your weight and prevent disease.  Limit alcohol to one drink per day.  No smoking.   Wear sunscreen to prevent skin cancer.   See your dentist twice a year for an exam and cleaning.  See your eye doctor every 1 to 2 years to screen for conditions such as glaucoma, macular degeneration and cataracts.    Personalized Prevention Plan  You are due for the preventive services outlined below.  Your care team is available to assist you in scheduling these services.  If you have already completed any of these items, please share that information with your care team to update in your medical record.  Health Maintenance   Topic Date Due    COVID-19 Vaccine (1) Never done    ZOSTER IMMUNIZATION (1 of 2) Never done    PHQ-2 (once per calendar year)  01/01/2022    ANNUAL REVIEW OF HM ORDERS  07/21/2022    FALL RISK ASSESSMENT  07/21/2022    INFLUENZA VACCINE (1) 09/01/2022    BMP  07/25/2023    LIPID  07/25/2023    MEDICARE ANNUAL WELLNESS VISIT  08/01/2023    DTAP/TDAP/TD IMMUNIZATION (3 - Td or Tdap) 05/18/2026    ADVANCE CARE PLANNING  07/21/2026    DEXA  04/26/2027    Pneumococcal Vaccine: 65+ Years  Completed    IPV IMMUNIZATION  Aged Out    MENINGITIS IMMUNIZATION  Aged Out    HEPATITIS B IMMUNIZATION  Aged Out

## 2022-08-09 ENCOUNTER — TELEPHONE (OUTPATIENT)
Dept: FAMILY MEDICINE | Facility: CLINIC | Age: 82
End: 2022-08-09

## 2022-08-09 DIAGNOSIS — E03.8 OTHER SPECIFIED HYPOTHYROIDISM: ICD-10-CM

## 2022-08-09 NOTE — TELEPHONE ENCOUNTER
Patient calling, she was seen by José Vora on 8/1/22 and had additional labs done:  A1C and TSH.   She has not heard results yet.    No result note yet attached.    TSH   Date Value Ref Range Status   08/01/2022 4.39 (H) 0.40 - 4.00 mU/L Final   06/24/2020 5.50 (H) 0.40 - 4.00 mU/L Final     Lab Results   Component Value Date    A1C 5.0 08/01/2022       Routed to José Vora to address lab results so we can call patient (she does not have mychart active).    Do Estrada RN  [facility]

## 2022-08-10 RX ORDER — LEVOTHYROXINE SODIUM 88 UG/1
88 TABLET ORAL DAILY
Qty: 90 TABLET | Refills: 0 | Status: SHIPPED | OUTPATIENT
Start: 2022-08-10 | End: 2022-11-09

## 2022-08-10 NOTE — TELEPHONE ENCOUNTER
Notified patient of the orders/message and results below per José Vora PA-C.    Patient stated understanding and agreeable with the plan of care.     Sabina RN,BSN  Triage Nurse  [facility]: [facility]

## 2022-08-10 NOTE — TELEPHONE ENCOUNTER
Her tsh was a little high. As such, i'm increasing her levothyroxine dose to 88 mcg daily. I want her tsh rechecked again in 2-3 mos. The rest of her lab work came back nice and stable/normal.

## 2022-10-04 DIAGNOSIS — E78.5 HYPERLIPIDEMIA LDL GOAL <130: ICD-10-CM

## 2022-10-05 ENCOUNTER — OFFICE VISIT (OUTPATIENT)
Dept: OPHTHALMOLOGY | Facility: CLINIC | Age: 82
End: 2022-10-05
Payer: COMMERCIAL

## 2022-10-05 DIAGNOSIS — H40.051 OCULAR HYPERTENSION OF RIGHT EYE: ICD-10-CM

## 2022-10-05 DIAGNOSIS — Z86.69 HISTORY OF RETINAL DETACHMENT: ICD-10-CM

## 2022-10-05 DIAGNOSIS — H43.811 POSTERIOR VITREOUS DETACHMENT, RIGHT: ICD-10-CM

## 2022-10-05 DIAGNOSIS — H44.522 BLIND HYPOTENSIVE EYE, LEFT: ICD-10-CM

## 2022-10-05 DIAGNOSIS — Z01.01 ENCOUNTER FOR EXAMINATION OF EYES AND VISION WITH ABNORMAL FINDINGS: Primary | ICD-10-CM

## 2022-10-05 DIAGNOSIS — Z96.1 PSEUDOPHAKIA: ICD-10-CM

## 2022-10-05 DIAGNOSIS — H52.4 PRESBYOPIA: ICD-10-CM

## 2022-10-05 PROCEDURE — 92014 COMPRE OPH EXAM EST PT 1/>: CPT | Performed by: OPHTHALMOLOGY

## 2022-10-05 PROCEDURE — 92015 DETERMINE REFRACTIVE STATE: CPT | Performed by: OPHTHALMOLOGY

## 2022-10-05 RX ORDER — SIMVASTATIN 20 MG
TABLET ORAL
Qty: 90 TABLET | Refills: 0 | Status: SHIPPED | OUTPATIENT
Start: 2022-10-05 | End: 2022-12-20

## 2022-10-05 ASSESSMENT — CONF VISUAL FIELD
OD_NORMAL: 1
OS_INFERIOR_TEMPORAL_RESTRICTION: 1
OS_SUPERIOR_TEMPORAL_RESTRICTION: 1
OS_INFERIOR_NASAL_RESTRICTION: 1
OS_SUPERIOR_NASAL_RESTRICTION: 1

## 2022-10-05 ASSESSMENT — SLIT LAMP EXAM - LIDS
COMMENTS: 1+ DERMATOCHALASIS - UPPER LID
COMMENTS: 1+ DERMATOCHALASIS - UPPER LID

## 2022-10-05 ASSESSMENT — REFRACTION_WEARINGRX
OD_AXIS: 049
OD_SPHERE: -0.25
OD_CYLINDER: +1.25
SPECS_TYPE: PAL
OS_AXIS: 040
OS_SPHERE: PLANO
OS_ADD: +3.00
OD_ADD: +3.00
OS_CYLINDER: +0.50

## 2022-10-05 ASSESSMENT — REFRACTION_MANIFEST
OD_SPHERE: -1.00
OD_ADD: +3.00
OD_CYLINDER: +1.25
OD_AXIS: 018

## 2022-10-05 ASSESSMENT — VISUAL ACUITY
OD_CC: J2
OS_CC: NLP
METHOD: SNELLEN - LINEAR
CORRECTION_TYPE: GLASSES
OD_CC: 20/30

## 2022-10-05 ASSESSMENT — CUP TO DISC RATIO: OD_RATIO: 0.3

## 2022-10-05 ASSESSMENT — TONOMETRY
IOP_METHOD: APPLANATION
OD_IOP_MMHG: 20
OS_IOP_MMHG: 13

## 2022-10-05 ASSESSMENT — EXTERNAL EXAM - LEFT EYE: OS_EXAM: 2+ BROW PTOSIS

## 2022-10-05 ASSESSMENT — EXTERNAL EXAM - RIGHT EYE: OD_EXAM: 2+ BROW PTOSIS

## 2022-10-05 NOTE — PROGRESS NOTES
Current Eye Medications:  None.       Subjective:  Comprehensive Eye Exam.  No vision changes or concerns.  Glasses work adequately.       Objective:  See Ophthalmology Exam.       Assessment:  Stable eye exam in monocular patient.      ICD-10-CM    1. Encounter for examination of eyes and vision with abnormal findings  Z01.01       2. Presbyopia  H52.4 REFRACTIVE STATUS      3. Pseudophakia, od  Z96.1 EYE EXAM (SIMPLE-NONBILLABLE)      4. 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 History of retinal detachment, od  Z86.69       6. 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 Posterior vitreous detachment, right  H43.811            Plan:  Glasses Rx given - optional   May use artificial tears up to 4 times daily both eyes. (Refresh Tears, Systane Ultra/Balance, or Theratears)   Possible clouding of posterior capsule right eye discussed.  Call in June 2023 for an appointment in October 2023 for Complete Exam    Dr. Jesus [phone]

## 2022-10-05 NOTE — PATIENT INSTRUCTIONS
Glasses Rx given - optional   May use artificial tears up to 4 times daily both eyes. (Refresh Tears, Systane Ultra/Balance, or Theratears)   Possible clouding of posterior capsule right eye discussed.  Call in June 2023 for an appointment in October 2023 for Complete Exam    Dr. Jesus [phone]

## 2022-10-05 NOTE — LETTER
10/5/2022         RE: Claire Moore  [address]  [place] [address]        Dear Colleague,    Thank you for referring your patient, Claire Moore, to the [facility]. Please see a copy of my visit note below.     Current Eye Medications:  None.       Subjective:  Comprehensive Eye Exam.  No vision changes or concerns.  Glasses work adequately.       Objective:  See Ophthalmology Exam.       Assessment:  Stable eye exam in monocular patient.      ICD-10-CM    1. Encounter for examination of eyes and vision with abnormal findings  Z01.01       2. Presbyopia  H52.4 REFRACTIVE STATUS      3. Pseudophakia, od  Z96.1 EYE EXAM (SIMPLE-NONBILLABLE)      4. 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 History of retinal detachment, od  Z86.69       6. 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 Posterior vitreous detachment, right  H43.811            Plan:  Glasses Rx given - optional   May use artificial tears up to 4 times daily both eyes. (Refresh Tears, Systane Ultra/Balance, or Theratears)   Possible clouding of posterior capsule right eye discussed.  Call in June 2023 for an appointment in October 2023 for Complete Exam    Dr. Jesus [phone]          Again, thank you for allowing me to participate in the care of your patient.        Sincerely,        Shemar Jesus MD

## 2022-10-11 DIAGNOSIS — I10 HYPERTENSION GOAL BP (BLOOD PRESSURE) < 140/90: ICD-10-CM

## 2022-10-12 RX ORDER — CARVEDILOL 12.5 MG/1
TABLET ORAL
Qty: 180 TABLET | Refills: 2 | Status: SHIPPED | OUTPATIENT
Start: 2022-10-12 | End: 2023-06-19

## 2022-11-08 ENCOUNTER — LAB (OUTPATIENT)
Dept: LAB | Facility: CLINIC | Age: 82
End: 2022-11-08
Payer: COMMERCIAL

## 2022-11-08 DIAGNOSIS — E03.8 OTHER SPECIFIED HYPOTHYROIDISM: ICD-10-CM

## 2022-11-08 LAB — TSH SERPL DL<=0.005 MIU/L-ACNC: 1.05 MU/L (ref 0.4–4)

## 2022-11-08 PROCEDURE — 36415 COLL VENOUS BLD VENIPUNCTURE: CPT

## 2022-11-08 PROCEDURE — 84443 ASSAY THYROID STIM HORMONE: CPT

## 2022-11-09 ENCOUNTER — TELEPHONE (OUTPATIENT)
Dept: FAMILY MEDICINE | Facility: CLINIC | Age: 82
End: 2022-11-09

## 2022-11-09 NOTE — TELEPHONE ENCOUNTER
----- Message from José Vora PA-C sent at 11/9/2022  5:51 AM CST -----  Claire,  Your tsh came back with in therapeutic range. Continue your current dose of levothyroxine.    José

## 2022-11-09 NOTE — TELEPHONE ENCOUNTER
Called [phone] (home)     Did they answer the phone: No, left a message on voicemail to return call to the [facility] at [phone].    Sabina RN,BSN  Triage Nurse  [facility]: [facility]

## 2022-11-09 NOTE — TELEPHONE ENCOUNTER
Patient returned call.    Relayed provider's message as written. Informed patient her levothyroxine medication was sent to pharmacy. Patient verbalized understanding and has no further questions at this time.    Harish Liu RN  [facility]

## 2022-11-28 ENCOUNTER — TELEPHONE (OUTPATIENT)
Dept: FAMILY MEDICINE | Facility: CLINIC | Age: 82
End: 2022-11-28

## 2022-11-28 NOTE — TELEPHONE ENCOUNTER
Reason for Call:  Other call back    Detailed comments: Patient is requesting how to sign up to get a handicap parking permit. Please call her back to advise further.    Phone Number Patient can be reached at: Cell number on file:    Telephone Information:   Mobile [phone]       Best Time: any     Can we leave a detailed message on this number? YES    Call taken on 11/28/2022 at 4:22 PM by Bibi Graham

## 2022-12-20 DIAGNOSIS — E78.5 HYPERLIPIDEMIA LDL GOAL <130: ICD-10-CM

## 2022-12-20 RX ORDER — SIMVASTATIN 20 MG
TABLET ORAL
Qty: 90 TABLET | Refills: 0 | Status: SHIPPED | OUTPATIENT
Start: 2022-12-20 | End: 2023-03-20

## 2023-02-27 NOTE — ED NOTES
Pt reports right abdominal pain that radiates across abdomen and into back.   Pt denies difficulty with eating, drinking or bowel movements.   [Negative] : Genitourinary

## 2023-03-20 DIAGNOSIS — E78.5 HYPERLIPIDEMIA LDL GOAL <130: ICD-10-CM

## 2023-03-20 RX ORDER — SIMVASTATIN 20 MG
TABLET ORAL
Qty: 90 TABLET | Refills: 0 | Status: SHIPPED | OUTPATIENT
Start: 2023-03-20 | End: 2023-06-22

## 2023-05-05 DIAGNOSIS — E03.8 OTHER SPECIFIED HYPOTHYROIDISM: ICD-10-CM

## 2023-05-05 RX ORDER — LEVOTHYROXINE SODIUM 88 UG/1
TABLET ORAL
Qty: 90 TABLET | Refills: 0 | Status: SHIPPED | OUTPATIENT
Start: 2023-05-05 | End: 2023-07-25

## 2023-06-17 DIAGNOSIS — I10 HYPERTENSION GOAL BP (BLOOD PRESSURE) < 140/90: ICD-10-CM

## 2023-06-19 RX ORDER — CARVEDILOL 12.5 MG/1
TABLET ORAL
Qty: 180 TABLET | Refills: 0 | Status: SHIPPED | OUTPATIENT
Start: 2023-06-19 | End: 2023-10-10

## 2023-06-19 RX ORDER — LISINOPRIL 20 MG/1
TABLET ORAL
Qty: 180 TABLET | Refills: 0 | Status: SHIPPED | OUTPATIENT
Start: 2023-06-19 | End: 2023-10-25

## 2023-07-25 DIAGNOSIS — E03.8 OTHER SPECIFIED HYPOTHYROIDISM: ICD-10-CM

## 2023-07-25 RX ORDER — LEVOTHYROXINE SODIUM 88 UG/1
TABLET ORAL
Qty: 90 TABLET | Refills: 0 | Status: SHIPPED | OUTPATIENT
Start: 2023-07-25 | End: 2023-08-02

## 2023-07-26 ENCOUNTER — LAB (OUTPATIENT)
Dept: LAB | Facility: CLINIC | Age: 83
End: 2023-07-26
Payer: COMMERCIAL

## 2023-07-26 DIAGNOSIS — I10 WHITE COAT SYNDROME WITH HYPERTENSION: ICD-10-CM

## 2023-07-26 DIAGNOSIS — E78.5 HYPERLIPIDEMIA LDL GOAL <130: ICD-10-CM

## 2023-07-26 LAB
ANION GAP SERPL CALCULATED.3IONS-SCNC: 13 MMOL/L (ref 7–15)
BUN SERPL-MCNC: 17.8 MG/DL (ref 8–23)
CALCIUM SERPL-MCNC: 10 MG/DL (ref 8.8–10.2)
CHLORIDE SERPL-SCNC: 100 MMOL/L (ref 98–107)
CHOLEST SERPL-MCNC: 177 MG/DL
CREAT SERPL-MCNC: 0.6 MG/DL (ref 0.51–0.95)
DEPRECATED HCO3 PLAS-SCNC: 27 MMOL/L (ref 22–29)
GFR SERPL CREATININE-BSD FRML MDRD: 87 ML/MIN/1.73M2
GLUCOSE SERPL-MCNC: 106 MG/DL (ref 70–99)
HDLC SERPL-MCNC: 94 MG/DL
LDLC SERPL CALC-MCNC: 64 MG/DL
NONHDLC SERPL-MCNC: 83 MG/DL
POTASSIUM SERPL-SCNC: 4.7 MMOL/L (ref 3.4–5.3)
SODIUM SERPL-SCNC: 140 MMOL/L (ref 136–145)
TRIGL SERPL-MCNC: 96 MG/DL

## 2023-07-26 PROCEDURE — 80048 BASIC METABOLIC PNL TOTAL CA: CPT

## 2023-07-26 PROCEDURE — 80061 LIPID PANEL: CPT

## 2023-07-26 PROCEDURE — 36415 COLL VENOUS BLD VENIPUNCTURE: CPT

## 2023-08-02 ENCOUNTER — OFFICE VISIT (OUTPATIENT)
Dept: FAMILY MEDICINE | Facility: CLINIC | Age: 83
End: 2023-08-02
Payer: COMMERCIAL

## 2023-08-02 VITALS
SYSTOLIC BLOOD PRESSURE: 182 MMHG | DIASTOLIC BLOOD PRESSURE: 80 MMHG | OXYGEN SATURATION: 94 % | HEIGHT: 62 IN | WEIGHT: 145.8 LBS | RESPIRATION RATE: 24 BRPM | BODY MASS INDEX: 26.83 KG/M2 | HEART RATE: 78 BPM | TEMPERATURE: 96.7 F

## 2023-08-02 DIAGNOSIS — I10 HYPERTENSION GOAL BP (BLOOD PRESSURE) < 140/90: ICD-10-CM

## 2023-08-02 DIAGNOSIS — Z00.00 ENCOUNTER FOR MEDICARE ANNUAL WELLNESS EXAM: Primary | ICD-10-CM

## 2023-08-02 DIAGNOSIS — E03.8 OTHER SPECIFIED HYPOTHYROIDISM: ICD-10-CM

## 2023-08-02 DIAGNOSIS — G62.9 NEUROPATHY: ICD-10-CM

## 2023-08-02 PROCEDURE — G0439 PPPS, SUBSEQ VISIT: HCPCS | Performed by: PHYSICIAN ASSISTANT

## 2023-08-02 PROCEDURE — 99213 OFFICE O/P EST LOW 20 MIN: CPT | Mod: 25 | Performed by: PHYSICIAN ASSISTANT

## 2023-08-02 RX ORDER — LEVOTHYROXINE SODIUM 88 UG/1
88 TABLET ORAL DAILY
Qty: 90 TABLET | Refills: 1 | Status: SHIPPED | OUTPATIENT
Start: 2023-08-02 | End: 2024-05-02

## 2023-08-02 RX ORDER — FELODIPINE 10 MG/1
10 TABLET, EXTENDED RELEASE ORAL DAILY
Qty: 90 TABLET | Refills: 0 | Status: SHIPPED | OUTPATIENT
Start: 2023-08-02 | End: 2023-11-07

## 2023-08-02 ASSESSMENT — ENCOUNTER SYMPTOMS
HEMATOCHEZIA: 0
MYALGIAS: 0
CONSTIPATION: 0
ARTHRALGIAS: 0
EYE PAIN: 0
JOINT SWELLING: 1
PARESTHESIAS: 1
CHILLS: 0
WEAKNESS: 0
SORE THROAT: 0
DIARRHEA: 0
HEARTBURN: 0
HEADACHES: 0
BREAST MASS: 0
DYSURIA: 0
NERVOUS/ANXIOUS: 0
FEVER: 0
DIZZINESS: 0
COUGH: 0
HEMATURIA: 0
SHORTNESS OF BREATH: 1
FREQUENCY: 1
NAUSEA: 0
ABDOMINAL PAIN: 0
PALPITATIONS: 0

## 2023-08-02 ASSESSMENT — ACTIVITIES OF DAILY LIVING (ADL): CURRENT_FUNCTION: NO ASSISTANCE NEEDED

## 2023-08-02 ASSESSMENT — PAIN SCALES - GENERAL: PAINLEVEL: NO PAIN (0)

## 2023-08-02 NOTE — PATIENT INSTRUCTIONS
"Patient Education   Personalized Prevention Plan  You are due for the preventive services outlined below.  Your care team is available to assist you in scheduling these services.  If you have already completed any of these items, please share that information with your care team to update in your medical record.  Health Maintenance Due   Topic Date Due     COVID-19 Vaccine (1) Never done     Zoster (Shingles) Vaccine (1 of 2) Never done     Learning About Being Physically Active  What is physical activity?     Being physically active means doing any kind of activity that gets your body moving.  The types of physical activity that can help you get fit and stay healthy include:  Aerobic or \"cardio\" activities. These make your heart beat faster and make you breathe harder, such as brisk walking, riding a bike, or running. They strengthen your heart and lungs and build up your endurance.  Strength training activities. These make your muscles work against, or \"resist,\" something. Examples include lifting weights or doing push-ups. These activities help tone and strengthen your muscles and bones.  Stretches. These let you move your joints and muscles through their full range of motion. Stretching helps you be more flexible.  Reaching a balance between these three types of physical activity is important because each one contributes to your overall fitness.  What are the benefits of being active?  Being active is one of the best things you can do for your health. It helps you to:  Feel stronger and have more energy to do all the things you like to do.  Focus better at school or work.  Feel, think, and sleep better.  Reach and stay at a healthy weight.  Lose fat and build lean muscle.  Lower your risk for serious health problems, including diabetes, heart attack, high blood pressure, and some cancers.  Keep your heart, lungs, bones, muscles, and joints strong and healthy.  How can you make being active part of your " "life?  Start slowly. Make it your long-term goal to get at least 30 minutes of exercise on most days of the week. Walking is a good choice. You also may want to do other activities, such as running, swimming, cycling, or playing tennis or team sports.  Pick activities that you like--ones that make your heart beat faster, your muscles stronger, and your muscles and joints more flexible. If you find more than one thing you like doing, do them all. You don't have to do the same thing every day.  Get your heart pumping every day. Any activity that makes your heart beat faster and keeps it at that rate for a while counts.  Here are some great ways to get your heart beating faster:  Go for a brisk walk, run, or bike ride.  Go for a hike or swim.  Go in-line skating.  Play a game of touch football, basketball, or soccer.  Ride a bike.  Play tennis or racquetball.  Climb stairs.  Even some household chores can be aerobic--just do them at a faster pace. Vacuuming, raking or mowing the lawn, sweeping the garage, and washing and waxing the car all can help get your heart rate up.  Strengthen your muscles during the week. You don't have to lift heavy weights or grow big, bulky muscles to get stronger. Doing a few simple activities that make your muscles work against, or \"resist,\" something can help you get stronger.  For example, you can:  Do push-ups or sit-ups, which use your own body weight as resistance.  Lift weights or dumbbells or use stretch bands at home or in a gym or community center.  Stretch your muscles often. Stretching will help you as you become more active. It can help you stay flexible, loosen tight muscles, and avoid injury. It can also help improve your balance and posture and can be a great way to relax.  Be sure to stretch the muscles you'll be using when you work out. It's best to warm your muscles slightly before you stretch them. Walk or do some other light aerobic activity for a few minutes, and then " "start stretching.  When you stretch your muscles:  Do it slowly. Stretching is not about going fast or making sudden movements.  Don't push or bounce during a stretch.  Hold each stretch for at least 15 to 30 seconds, if you can. You should feel a stretch in the muscle, but not pain.  Breathe out as you do the stretch. Then breathe in as you hold the stretch. Don't hold your breath.  If you're worried about how more activity might affect your health, have a checkup before you start. Follow any special advice your doctor gives you for getting a smart start.  Where can you learn more?  Go to https://www.[employer].net/patiented  Enter W332 in the search box to learn more about \"Learning About Being Physically Active.\"  Current as of: October 10, 2022               Content Version: 13.7    0535-1368 [employer].   Care instructions adapted under license by your healthcare professional. If you have questions about a medical condition or this instruction, always ask your healthcare professional. [employer] disclaims any warranty or liability for your use of this information.      Learning About Dietary Guidelines  What are the Dietary Guidelines for Americans?     Dietary Guidelines for Americans provide tips for eating well and staying healthy. This helps reduce the risk for long-term (chronic) diseases.  These guidelines recommend that you:  Eat and drink the right amount for you. The U.[place] government's food guide is called MyPlate. It can help you make your own well-balanced eating plan.  Try to balance your eating with your activity. This helps you stay at a healthy weight.  Drink alcohol in moderation, if at all.  Limit foods high in salt, saturated fat, trans fat, and added sugar.  These guidelines are from the U.S. Department of Agriculture and the U.S. Department of Health and Human Services. They are updated every 5 years.  What is MyPlate?  MyPlate is the [place] government's food guide. " "It can help you make your own well-balanced eating plan. A balanced eating plan means that you eat enough, but not too much, and that your food gives you the nutrients you need to stay healthy.  MyPlate focuses on eating plenty of whole grains, fruits, and vegetables, and on limiting fat and sugar. It is available online at www.ChooseMyPlate.gov.  How can you get started?  If you're trying to eat healthier, you can slowly change your eating habits over time. You don't have to make big changes all at once. Start by adding one or two healthy foods to your meals each day.  Grains  Choose whole-grain breads and cereals and whole-wheat pasta and whole-grain crackers.  Vegetables  Eat a variety of vegetables every day. They have lots of nutrients and are part of a heart-healthy diet.  Fruits  Eat a variety of fruits every day. Fruits contain lots of nutrients. Choose fresh fruit instead of fruit juice.  Protein foods  Choose fish and lean poultry more often. Eat red meat and fried meats less often. Dried beans, tofu, and nuts are also good sources of protein.  Dairy  Choose low-fat or fat-free products from this food group. If you have problems digesting milk, try eating cheese or yogurt instead.  Fats and oils  Limit fats and oils if you're trying to cut calories. Choose healthy fats when you cook. These include canola oil and olive oil.  Where can you learn more?  Go to https://www.[employer].net/patiented  Enter D676 in the search box to learn more about \"Learning About Dietary Guidelines.\"  Current as of: March 1, 2023               Content Version: 13.7    9131-3080 [employer].   Care instructions adapted under license by your healthcare professional. If you have questions about a medical condition or this instruction, always ask your healthcare professional. [employer] disclaims any warranty or liability for your use of this information.         "

## 2023-08-02 NOTE — PROGRESS NOTES
"SUBJECTIVE:   Claire is a 83 year old who presents for Preventive Visit.      8/2/2023     9:14 AM   Additional Questions   Roomed by Pattie Quintanilla CMA   Accompanied by None         8/2/2023     9:14 AM   Patient Reported Additional Medications   Patient reports taking the following new medications none       Are you in the first 12 months of your Medicare coverage?  No    Healthy Habits:     In general, how would you rate your overall health?  Excellent    Frequency of exercise:  None    Do you usually eat at least 4 servings of fruit and vegetables a day, include whole grains    & fiber and avoid regularly eating high fat or \"junk\" foods?  No    Taking medications regularly:  Yes    Medication side effects:  None    Ability to successfully perform activities of daily living:  No assistance needed    Home Safety:  No safety concerns identified    Hearing Impairment:  No hearing concerns    In the past 6 months, have you been bothered by leaking of urine?  No    In general, how would you rate your overall mental or emotional health?  Excellent    Additional concerns today:  Yes        Have you ever done Advance Care Planning? (For example, a Health Directive, POLST, or a discussion with a medical provider or your loved ones about your wishes): No, advance care planning information given to patient to review.  Patient plans to discuss their wishes with loved ones or provider.         Fall risk  Fallen 2 or more times in the past year?: No  Any fall with injury in the past year?: No    Cognitive Screening   1) Repeat 3 items (Leader, Season, Table)    2) Clock draw: NORMAL  3) 3 item recall: Recalls 3 objects  Results: 3 items recalled: COGNITIVE IMPAIRMENT LESS LIKELY    Mini-CogTM Copyright LUIS Ya. Licensed by the author for use in [facility]; reprinted with permission (tr@.[facility]). All rights reserved.      Do you have sleep apnea, excessive snoring or daytime drowsiness? : no    Reviewed and " updated as needed this visit by clinical staff   Tobacco  Allergies  Meds              Reviewed and updated as needed this visit by Provider                 Social History     Tobacco Use     Smoking status: Former     Packs/day: 1.00     Years: 30.00     Pack years: 30.00     Types: Cigarettes     Start date: 1965     Quit date: 2011     Years since quittin.5     Smokeless tobacco: Never   Substance Use Topics     Alcohol use: Yes     Comment: jessika and water 3-4x/week             2023     9:24 AM   Alcohol Use   Prescreen: >3 drinks/day or >7 drinks/week? No     Do you have a current opioid prescription? No  Do you use any other controlled substances or medications that are not prescribed by a provider? None      Numbness in both feet, causing balance issues  No burning. No profound low back pain.  Numbness improves with activity. Usually occurs with prolong positioning , like sitting.   Recheck of htn.  Home numbers running a little high systolically. Nothing like todays number of 180.   No chest pain/sob/palpitations/dizziness/ha's    Current providers sharing in care for this patient include:   Patient Care Team:  José Vora PA-C as PCP - General (Family Medicine)  José Vora PA-C as Assigned PCP  Shemar Jesus MD as Assigned Surgical Provider    The following health maintenance items are reviewed in Epic and correct as of today:  Health Maintenance   Topic Date Due     COVID-19 Vaccine (1) Never done     ZOSTER IMMUNIZATION (1 of 2) Never done     MEDICARE ANNUAL WELLNESS VISIT  2023     INFLUENZA VACCINE (1) 2023     TSH W/FREE T4 REFLEX  2023     BMP  2024     LIPID  2024     ANNUAL REVIEW OF HM ORDERS  2024     FALL RISK ASSESSMENT  2024     DTAP/TDAP/TD IMMUNIZATION (2 - Td or Tdap) 2026     ADVANCE CARE PLANNING  2028     PHQ-2 (once per calendar year)  Completed     Pneumococcal Vaccine: 65+ Years  Completed      IPV IMMUNIZATION  Aged Out     MENINGITIS IMMUNIZATION  Aged Out     Lab work is in process  Labs reviewed in EPIC  BP Readings from Last 3 Encounters:   23 (!) 182/80   22 132/64   21 129/63    Wt Readings from Last 3 Encounters:   23 66.1 kg (145 lb 12.8 oz)   22 63 kg (139 lb)   21 66 kg (145 lb 9.6 oz)                  Patient Active Problem List   Diagnosis     White coat syndrome with hypertension     Hyperlipidemia LDL goal <130     Advanced directives, counseling/discussion     DJD (degenerative joint disease)     Pseudophakia, od     Blind hypotensive eye, left     History of retinal detachment, od     Posterior vitreous detachment, right     Other specified hypothyroidism     Appendicitis     S/P laparoscopic appendectomy     Ocular hypertension of right eye     Past Surgical History:   Procedure Laterality Date     CATARACT IOL, RT/LT       COLPORRHAPHY ANTERIOR N/A 3/30/2017    Procedure: COLPORRHAPHY ANTERIOR;  Surgeon: Rene Mitchell MD;  Location: [facility] OR     CYSTOSCOPY, SLING TRANSVAGINAL N/A 3/30/2017    Procedure: CYSTOSCOPY, SLING TRANSVAGINAL;  Surgeon: Rene Mitchell MD;  Location: [facility] OR     D & C       LAPAROSCOPIC APPENDECTOMY N/A 2018    Procedure: LAPAROSCOPIC APPENDECTOMY;  Laparoscopic appendectomy;  Surgeon: Glenn Pacheco MD;  Location: [facility]     PHACOEMULSIFICATION WITH STANDARD INTRAOCULAR LENS IMPLANT  2015    right eye     RETINAL REATTACHMENT  remotely    right eye     TONSILLECTOMY       TUBAL LIGATION         Social History     Tobacco Use     Smoking status: Former     Packs/day: 1.00     Years: 30.00     Pack years: 30.00     Types: Cigarettes     Start date: 1965     Quit date: 2011     Years since quittin.5     Smokeless tobacco: Never   Substance Use Topics     Alcohol use: Yes     Comment: jessika and water 3-4x/week     Family History   Problem Relation Age of Onset     Alzheimer Disease Mother       Osteoporosis Mother      Hypertension Mother      Prostate Cancer Father      Osteoporosis Maternal Grandmother      Cancer Brother      Eye Disorder Brother      Eye Disorder Sister      Eye Disorder Son      Eye Disorder Brother      Eye Disorder Brother      Cerebrovascular Disease Paternal Grandmother      Diabetes No family hx of      Thyroid Disease No family hx of      Glaucoma No family hx of      Macular Degeneration No family hx of          Current Outpatient Medications   Medication Sig Dispense Refill     CALCIUM 500 OR 1 per day        carvedilol (COREG) 12.5 MG tablet TAKE 1 TABLET BY MOUTH TWICE DAILY WITH MEALS 180 tablet 0     cholecalciferol (VITAMIN D) 1000 UNIT tablet Take 1 tablet by mouth daily.       felodipine ER (PLENDIL) 10 MG 24 hr tablet Take 1 tablet (10 mg) by mouth daily 90 tablet 0     furosemide (LASIX) 20 MG tablet Take 1 tablet (20 mg) by mouth daily 90 tablet 3     levothyroxine (SYNTHROID/LEVOTHROID) 88 MCG tablet Take 1 tablet (88 mcg) by mouth daily 90 tablet 1     lisinopril (ZESTRIL) 20 MG tablet Take 1 tablet by mouth twice daily 180 tablet 0     Multiple Vitamin (MULTI-DAY VITAMINS PO) Take  by mouth. 1 tab daily       simvastatin (ZOCOR) 20 MG tablet Take 1 tablet by mouth once daily 90 tablet 0     Allergies   Allergen Reactions     Amlodipine Swelling     Side effect / swelling     Hctz [Hydrochlorothiazide]      Low sodium       Recent Labs   Lab Test 07/26/23  0901 11/08/22  0820 08/01/22  1147 07/25/22  0924 07/14/21  1503 07/14/21  1503 06/24/20  0824 07/11/19  0852   A1C  --   --  5.0  --   --   --   --   --    LDL 64  --   --  54  --  59 63  --    HDL 94  --   --  105  --  76 79  --    TRIG 96  --   --  84  --  76 160*  --    CR 0.60  --   --  0.59   < > 0.75 0.83 0.88   GFRESTIMATED 87  --   --  88   < > 74 66 62   GFRESTBLACK  --   --   --   --   --   --  76 72   POTASSIUM 4.7  --   --  4.2   < > 4.9 4.5 5.1   TSH  --  1.05 4.39*  --    < > 3.49 5.50*  --     < >  "= values in this interval not displayed.        Mammogram Screening - Mammography discussed and declined  Pertinent mammograms are reviewed under the imaging tab.    Review of Systems   Constitutional:  Negative for chills and fever.   HENT:  Negative for congestion, ear pain, hearing loss and sore throat.    Eyes:  Negative for pain and visual disturbance.   Respiratory:  Positive for shortness of breath. Negative for cough.    Cardiovascular:  Negative for chest pain, palpitations and peripheral edema.   Gastrointestinal:  Negative for abdominal pain, constipation, diarrhea, heartburn, hematochezia and nausea.   Breasts:  Negative for tenderness, breast mass and discharge.   Genitourinary:  Positive for frequency and urgency. Negative for dysuria, genital sores, hematuria, pelvic pain, vaginal bleeding and vaginal discharge.   Musculoskeletal:  Positive for joint swelling. Negative for arthralgias and myalgias.   Skin:  Negative for rash.   Neurological:  Positive for paresthesias. Negative for dizziness, weakness and headaches.   Psychiatric/Behavioral:  Negative for mood changes. The patient is not nervous/anxious.      Constitutional, HEENT, cardiovascular, pulmonary, GI, , musculoskeletal, neuro, skin, endocrine and psych systems are negative, except as otherwise noted.    OBJECTIVE:   BP (!) 182/80   Pulse 78   Temp (!) 96.7  F (35.9  C) (Temporal)   Resp 24   Ht 1.581 m (5' 2.25\")   Wt 66.1 kg (145 lb 12.8 oz)   SpO2 94%   BMI 26.45 kg/m   Estimated body mass index is 26.45 kg/m  as calculated from the following:    Height as of this encounter: 1.581 m (5' 2.25\").    Weight as of this encounter: 66.1 kg (145 lb 12.8 oz).  Physical Exam  GENERAL APPEARANCE: healthy, alert and no distress  EYES: Eyes grossly normal to inspection, PERRL and conjunctivae and sclerae normal  HENT: ear canals and TM's normal, nose and mouth without ulcers or lesions, oropharynx clear and oral mucous membranes moist  NECK: " "no adenopathy, no asymmetry, masses, or scars and thyroid normal to palpation  RESP: lungs clear to auscultation - no rales, rhonchi or wheezes  BREAST: normal without masses, tenderness or nipple discharge and no palpable axillary masses or adenopathy  CV: regular rate and rhythm, normal S1 S2, no S3 or S4, no murmur, click or rub, no peripheral edema and peripheral pulses strong  ABDOMEN: soft, nontender, no hepatosplenomegaly, no masses and bowel sounds normal  MS: no musculoskeletal defects are noted and gait is age appropriate without ataxia  SKIN: no suspicious lesions or rashes  NEURO: Normal strength and tone, sensory exam grossly normal, mentation intact and speech normal  PSYCH: mentation appears normal and affect normal/bright  Dec vibratory sense in both feet. Mild dec in monofilament tactile sensation.   Diagnostic Test Results:  Labs reviewed in Epic    ASSESSMENT / PLAN:       ICD-10-CM    1. Encounter for Medicare annual wellness exam  Z00.00       2. Hypertension goal BP (blood pressure) < 140/90  I10 felodipine ER (PLENDIL) 10 MG 24 hr tablet      3. Other specified hypothyroidism  E03.8 levothyroxine (SYNTHROID/LEVOTHROID) 88 MCG tablet      4. Neuropathy  G62.9         Inc felodipine dose to 10 mg daily.  Monitor blood pressure at home and update me on her numbers.  work on lifestyle modification    Patient has been advised of split billing requirements and indicates understanding: Yes      COUNSELING:  Reviewed preventive health counseling, as reflected in patient instructions       Regular exercise       Healthy diet/nutrition      BMI:   Estimated body mass index is 26.45 kg/m  as calculated from the following:    Height as of this encounter: 1.581 m (5' 2.25\").    Weight as of this encounter: 66.1 kg (145 lb 12.8 oz).         She reports that she quit smoking about 12 years ago. Her smoking use included cigarettes. She started smoking about 58 years ago. She has a 30.00 pack-year smoking " history. She has never used smokeless tobacco.      Appropriate preventive services were discussed with this patient, including applicable screening as appropriate for cardiovascular disease, diabetes, osteopenia/osteoporosis, and glaucoma.  As appropriate for age/gender, discussed screening for colorectal cancer, prostate cancer, breast cancer, and cervical cancer. Checklist reviewing preventive services available has been given to the patient.    Reviewed patients plan of care and provided an AVS. The Basic Care Plan (routine screening as documented in Health Maintenance) for Claire meets the Care Plan requirement. This Care Plan has been established and reviewed with the Patient.          José Vora PA-C  [facility]    Identified Health Risks:  I have reviewed Opioid Use Disorder and Substance Use Disorder risk factors and made any needed referrals.   She is at risk for lack of exercise and has been provided with information to increase physical activity for the benefit of her well-being.  The patient was counseled and encouraged to consider modifying their diet and eating habits. She was provided with information on recommended healthy diet options.

## 2023-08-04 ENCOUNTER — TRANSFERRED RECORDS (OUTPATIENT)
Dept: HEALTH INFORMATION MANAGEMENT | Facility: CLINIC | Age: 83
End: 2023-08-04

## 2023-08-09 ENCOUNTER — TELEPHONE (OUTPATIENT)
Dept: FAMILY MEDICINE | Facility: CLINIC | Age: 83
End: 2023-08-09
Payer: COMMERCIAL

## 2023-08-09 NOTE — TELEPHONE ENCOUNTER
"Pt calling to report blood pressure readings from the past few day after starting felodipine ER (PLENDIL) 10 MG 24 hr tablet     8/3/23: 147/85  8/5/23: 134/74  8/7/23: 136/73  8/9/23: 106/56 then checked again and 106/66    Pt was advised to recheck BP two hours from 0945 and to call clinic with reading. Pt stated that she feels \"fine\" and has no symptoms.     Pt also advised to call clinic if she experiences lightheadedness, weakness, or worsening symptoms. Pt verbalized understanding and stated no further questions.    Routing to pcp to advise on new low bp.     Brandi Gomes RN on 8/9/2023 at 10:11 AM            "

## 2023-08-09 NOTE — TELEPHONE ENCOUNTER
Patient called with update     Two times 10 min apart, taken while sitting.   11:40 am 147/74  11: 50 am 136/69    Denies symptoms.     RN educated on use of ER/UC with blood pressures and symptoms. She verbalized understanding.       Rizwana Couch RN on 8/9/2023 at 11:58 AM

## 2023-08-11 NOTE — TELEPHONE ENCOUNTER
I'm fine with pressures less than 120/80 as long as she feels fine. Her numbers look ok . Have her continue with her current dose of felodipine.

## 2023-08-11 NOTE — TELEPHONE ENCOUNTER
Notified patient of the orders below per PCP.  Advised patient to call back with any questions or concerns.  Patient stated understanding and agreeable with the plan of care.     Sabina PATEL BSN  Triage Nurse  [facility]

## 2023-09-27 DIAGNOSIS — E78.5 HYPERLIPIDEMIA LDL GOAL <130: ICD-10-CM

## 2023-09-27 RX ORDER — SIMVASTATIN 20 MG
TABLET ORAL
Qty: 90 TABLET | Refills: 0 | Status: SHIPPED | OUTPATIENT
Start: 2023-09-27 | End: 2023-12-27

## 2023-10-11 ENCOUNTER — OFFICE VISIT (OUTPATIENT)
Dept: OPHTHALMOLOGY | Facility: CLINIC | Age: 83
End: 2023-10-11
Payer: COMMERCIAL

## 2023-10-11 DIAGNOSIS — H26.491 POSTERIOR CAPSULAR OPACIFICATION VISUALLY SIGNIFICANT, RIGHT EYE: ICD-10-CM

## 2023-10-11 DIAGNOSIS — H43.811 POSTERIOR VITREOUS DETACHMENT, RIGHT: ICD-10-CM

## 2023-10-11 DIAGNOSIS — Z01.01 ENCOUNTER FOR EXAMINATION OF EYES AND VISION WITH ABNORMAL FINDINGS: Primary | ICD-10-CM

## 2023-10-11 DIAGNOSIS — H40.051 OCULAR HYPERTENSION OF RIGHT EYE: ICD-10-CM

## 2023-10-11 DIAGNOSIS — H52.4 PRESBYOPIA: ICD-10-CM

## 2023-10-11 DIAGNOSIS — Z96.1 PSEUDOPHAKIA: ICD-10-CM

## 2023-10-11 DIAGNOSIS — Z86.69 HISTORY OF RETINAL DETACHMENT: ICD-10-CM

## 2023-10-11 DIAGNOSIS — H44.522 BLIND HYPOTENSIVE EYE, LEFT: ICD-10-CM

## 2023-10-11 PROCEDURE — 92015 DETERMINE REFRACTIVE STATE: CPT | Performed by: OPHTHALMOLOGY

## 2023-10-11 PROCEDURE — 66821 AFTER CATARACT LASER SURGERY: CPT | Mod: RT | Performed by: OPHTHALMOLOGY

## 2023-10-11 PROCEDURE — 92014 COMPRE OPH EXAM EST PT 1/>: CPT | Mod: 57 | Performed by: OPHTHALMOLOGY

## 2023-10-11 ASSESSMENT — VISUAL ACUITY
OD_PH_CC: 20/40
OD_CC: 20/50
METHOD: SNELLEN - LINEAR
CORRECTION_TYPE: GLASSES
OD_PH_CC+: -2

## 2023-10-11 ASSESSMENT — REFRACTION_MANIFEST
OS_ADD: +3.00
OD_CYLINDER: +0.75
OD_ADD: +3.00
OD_AXIS: 180
OS_SPHERE: BALANCE
OD_SPHERE: -0.50

## 2023-10-11 ASSESSMENT — CONF VISUAL FIELD
OD_SUPERIOR_TEMPORAL_RESTRICTION: 0
OD_INFERIOR_TEMPORAL_RESTRICTION: 0
OS_INFERIOR_TEMPORAL_RESTRICTION: 1
OS_INFERIOR_NASAL_RESTRICTION: 1
OS_SUPERIOR_TEMPORAL_RESTRICTION: 1
OD_INFERIOR_NASAL_RESTRICTION: 0
METHOD: COUNTING FINGERS
OD_SUPERIOR_NASAL_RESTRICTION: 0
OD_NORMAL: 1
OS_SUPERIOR_NASAL_RESTRICTION: 1

## 2023-10-11 ASSESSMENT — REFRACTION_WEARINGRX
OS_AXIS: 040
OS_CYLINDER: +0.50
OD_SPHERE: -0.25
OS_SPHERE: PLANO
SPECS_TYPE: PAL
OD_AXIS: 049
OS_ADD: +3.00
OD_CYLINDER: +1.25
OD_ADD: +3.00

## 2023-10-11 ASSESSMENT — TONOMETRY
IOP_METHOD: APPLANATION
OD_IOP_MMHG: 17
OS_IOP_MMHG: 11

## 2023-10-11 ASSESSMENT — SLIT LAMP EXAM - LIDS
COMMENTS: 1+ DERMATOCHALASIS - UPPER LID
COMMENTS: 1+ DERMATOCHALASIS - UPPER LID

## 2023-10-11 ASSESSMENT — EXTERNAL EXAM - RIGHT EYE: OD_EXAM: 2+ BROW PTOSIS

## 2023-10-11 ASSESSMENT — CUP TO DISC RATIO: OD_RATIO: 0.3

## 2023-10-11 ASSESSMENT — EXTERNAL EXAM - LEFT EYE: OS_EXAM: 2+ BROW PTOSIS

## 2023-10-11 NOTE — PROGRESS NOTES
Current Eye Medications:  None currently, but has Refresh as needed both eyes.     Subjective:  Complete eye exam. Vision is down little distance and near right eye for last 3 months. NLP left eye for about 20 years. No eye pain or discomfort in either eye.     Notes difficulty reading and driving.     Objective:  See Ophthalmology Exam.       Assessment:  Posterior capsule opacity right eye now visually significant; otherwise stable eye exam.      ICD-10-CM    1. Encounter for examination of eyes and vision with abnormal findings  Z01.01       2. Presbyopia  H52.4       3. Pseudophakia, od  Z96.1       4. Posterior capsular opacification visually significant, right eye  H26.491       5. 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 History of retinal detachment, od  Z86.69       7. 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 Posterior vitreous detachment, right  H43.811            Plan:  Yag Capsulotomy performed without problems right eye under Proparacaine anesthetic. Well tolerated.  Number of applications: 40  Power of applications: 1.3 mJ  Iopidine given.    Shemar Jesus M.D.

## 2023-10-11 NOTE — LETTER
10/11/2023         RE: Claire Moore  [address]  [place] [address]        Dear Colleague,    Thank you for referring your patient, Claire Moore, to the [facility]. Please see a copy of my visit note below.     Current Eye Medications:  None currently, but has Refresh as needed both eyes.     Subjective:  Complete eye exam. Vision is down little distance and near right eye for last 3 months. NLP left eye for about 20 years. No eye pain or discomfort in either eye.     Notes difficulty reading and driving.     Objective:  See Ophthalmology Exam.       Assessment:  Posterior capsule opacity right eye now visually significant; otherwise stable eye exam.      ICD-10-CM    1. Encounter for examination of eyes and vision with abnormal findings  Z01.01       2. Presbyopia  H52.4       3. Pseudophakia, od  Z96.1       4. Posterior capsular opacification visually significant, right eye  H26.491       5. 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 History of retinal detachment, od  Z86.69       7. 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 Posterior vitreous detachment, right  H43.811            Plan:  Yag Capsulotomy performed without problems right eye under Proparacaine anesthetic. Well tolerated.  Number of applications: 40  Power of applications: 1.3 mJ  Iopidine given.    Shemar Jesus M.D.           Again, thank you for allowing me to participate in the care of your patient.        Sincerely,        Shemar Jesus MD

## 2023-10-11 NOTE — PATIENT INSTRUCTIONS
Your eye may be slightly red, sore, and blurry for a few days.  You may notice some floaters for a few days.    Return Visit in about 1 week for S/P YAG Cap right eye - intraocular pressure check and refraction.     Shemar Jesus M.D.  [phone]

## 2023-10-20 ENCOUNTER — OFFICE VISIT (OUTPATIENT)
Dept: OPHTHALMOLOGY | Facility: CLINIC | Age: 83
End: 2023-10-20
Payer: COMMERCIAL

## 2023-10-20 DIAGNOSIS — Z96.1 PSEUDOPHAKIA: Primary | ICD-10-CM

## 2023-10-20 PROCEDURE — 99024 POSTOP FOLLOW-UP VISIT: CPT | Performed by: OPHTHALMOLOGY

## 2023-10-20 ASSESSMENT — VISUAL ACUITY
OD_CC: 20/30
OS_CC: NLP
METHOD: SNELLEN - LINEAR
OD_CC+: +2

## 2023-10-20 ASSESSMENT — SLIT LAMP EXAM - LIDS
COMMENTS: 1+ DERMATOCHALASIS - UPPER LID
COMMENTS: 1+ DERMATOCHALASIS - UPPER LID

## 2023-10-20 ASSESSMENT — REFRACTION_MANIFEST
OD_AXIS: 022
OD_SPHERE: -0.50
OS_SPHERE: BALANCE
OD_ADD: +3.00
OD_CYLINDER: +0.50

## 2023-10-20 ASSESSMENT — REFRACTION_WEARINGRX
OS_CYLINDER: +0.50
OD_ADD: +3.00
OS_AXIS: 040
OD_CYLINDER: +1.25
OS_ADD: +3.00
OS_SPHERE: PLANO
OD_SPHERE: -0.25
OD_AXIS: 049
SPECS_TYPE: PAL

## 2023-10-20 ASSESSMENT — TONOMETRY
IOP_METHOD: APPLANATION
OD_IOP_MMHG: 18
OS_IOP_MMHG: 12

## 2023-10-20 ASSESSMENT — EXTERNAL EXAM - LEFT EYE: OS_EXAM: 2+ BROW PTOSIS

## 2023-10-20 ASSESSMENT — EXTERNAL EXAM - RIGHT EYE: OD_EXAM: 2+ BROW PTOSIS

## 2023-10-20 NOTE — LETTER
"    10/20/2023         RE: Claire Moore  [address]  [place] [address]        Dear Colleague,    Thank you for referring your patient, Claire Moore, to the [facility]. Please see a copy of my visit note below.     Current Eye Medications: Artificial tears as needed      Subjective: Here for eye pressure check and refraction. S/p yag cap right eye  10/11/2023. Patient states that vision improved from procedure. No eye pain or discomfort.      Objective:  See Ophthalmology Exam.       Assessment:  Doing well s/p Yag Caps right eye.      Plan:  Glasses prescription given - optional    May use artificial tears up to four times a day (like Refresh Optive, Systane Balance, TheraTears, or generic artificial tears are ok. Avoid \"get the red out\" drops).     Call in June 2024 for an appointment in October 2024 for Complete Exam    Dr. Jesus [phone]         Again, thank you for allowing me to participate in the care of your patient.        Sincerely,        Shemar Jesus MD  "

## 2023-10-20 NOTE — PATIENT INSTRUCTIONS
"Glasses prescription given - optional    May use artificial tears up to four times a day (like Refresh Optive, Systane Balance, TheraTears, or generic artificial tears are ok. Avoid \"get the red out\" drops).     Call in June 2024 for an appointment in October 2024 for Complete Exam    Dr. Jesus [phone]    "

## 2023-10-20 NOTE — PROGRESS NOTES
" Current Eye Medications: Artificial tears as needed      Subjective: Here for eye pressure check and refraction. S/p yag cap right eye  10/11/2023. Patient states that vision improved from procedure. No eye pain or discomfort.      Objective:  See Ophthalmology Exam.       Assessment:  Doing well s/p Yag Caps right eye.      Plan:  Glasses prescription given - optional    May use artificial tears up to four times a day (like Refresh Optive, Systane Balance, TheraTears, or generic artificial tears are ok. Avoid \"get the red out\" drops).     Call in June 2024 for an appointment in October 2024 for Complete Exam    Dr. Jesus [phone]       "

## 2023-10-24 ENCOUNTER — TELEPHONE (OUTPATIENT)
Dept: FAMILY MEDICINE | Facility: CLINIC | Age: 83
End: 2023-10-24
Payer: COMMERCIAL

## 2023-10-24 DIAGNOSIS — G62.9 NEUROPATHY: Primary | ICD-10-CM

## 2023-10-24 DIAGNOSIS — I10 HYPERTENSION GOAL BP (BLOOD PRESSURE) < 140/90: ICD-10-CM

## 2023-10-24 NOTE — TELEPHONE ENCOUNTER
Pt calling to state that she would like to start a medication for neuropathy previously discussed at last appointment with pcp 8/2/23.    Pt stated that neuropathy has been increasing and requesting Gabapentin.     Do you want appointment to discuss Gabapentin usage for neuropathy?    Brandi Gomes RN on 10/24/2023 at 9:08 AM

## 2023-10-25 RX ORDER — LISINOPRIL 20 MG/1
TABLET ORAL
Qty: 180 TABLET | Refills: 0 | Status: SHIPPED | OUTPATIENT
Start: 2023-10-25 | End: 2024-01-23

## 2023-10-25 RX ORDER — GABAPENTIN 100 MG/1
CAPSULE ORAL
Qty: 90 CAPSULE | Refills: 1 | Status: SHIPPED | OUTPATIENT
Start: 2023-10-25 | End: 2023-11-05

## 2023-11-03 NOTE — TELEPHONE ENCOUNTER
Patient calling to request higher dosage for Gabapentin.    Patient recently started on Gabapentin (10/25/2023) and is at max dose (100 mg tid); it is not helping her neuropathy pain; would like to try higher dose.    Routing to PCP to advise.    Majo Wilson RN on 11/3/2023 at 10:30 AM

## 2023-11-05 DIAGNOSIS — G62.9 NEUROPATHY: ICD-10-CM

## 2023-11-05 RX ORDER — GABAPENTIN 100 MG/1
200 CAPSULE ORAL 3 TIMES DAILY
Qty: 90 CAPSULE | Refills: 1 | Status: SHIPPED | OUTPATIENT
Start: 2023-11-05 | End: 2023-11-05

## 2023-11-05 RX ORDER — GABAPENTIN 100 MG/1
200 CAPSULE ORAL 3 TIMES DAILY
Qty: 180 CAPSULE | Refills: 1 | Status: SHIPPED | OUTPATIENT
Start: 2023-11-05 | End: 2023-11-06

## 2023-11-06 DIAGNOSIS — G62.9 NEUROPATHY: ICD-10-CM

## 2023-11-06 RX ORDER — GABAPENTIN 100 MG/1
200 CAPSULE ORAL 3 TIMES DAILY
Qty: 180 CAPSULE | Refills: 1 | Status: SHIPPED | OUTPATIENT
Start: 2023-11-06 | End: 2024-01-02

## 2023-11-06 RX ORDER — GABAPENTIN 100 MG/1
200 CAPSULE ORAL 3 TIMES DAILY
Qty: 180 CAPSULE | Refills: 1 | Status: CANCELLED | OUTPATIENT
Start: 2023-11-06

## 2023-11-06 NOTE — TELEPHONE ENCOUNTER
Attempted to call patient at home/mobile number, no answer, left message on voicemail; patient was instructed to return call to [facility] at [phone].    Plan to cancel the MA BP check and schedule office visit with José Vora in December per José Vora's note below.    Do NORRIS RN  [facility] Triage    
Called [phone] (home)   Did they answer the phone: No, left a message on voicemail to return call to the [facility] at [phone], and to ask for any available triage nurse.    Sabina PATEL BSN  Triage Nurse  [facility]        
I reviewed the message from José Vora PA-C with patient and she verbalized a good understanding. She plans to do the BP  check with the MA on 11/14/23. She said her BP has been good at home.    Niurka Menjivar RN BSN  [facility]     
I'm fine with this plan   
Patient called back, and she stated that she just wants to see the MA for her BP on 11/14/23.  She stated that if her BP is still deshaun then, she will see PCP. She does not want to pay the extra money.    Routed to PCP to please advise.    Sabina THAKKAR RN  Triage Nurse  [facility]      
Patient is confused why she needs another visit with PCP, she says she has a MA visit on 11/14/23 to check her blood pressure.  Alida Delaney.  Thank you!    
The last time I saw her her blood pressure was quite elevated. I want to see her back to confirm that the med change helped and that her blood pressure is now better controlled.ok to wait and follow up in December.  
mary is due for a recheck. Have her make an appt to see me.for a recheck of her blood pressure     
No indicators present

## 2023-11-06 NOTE — TELEPHONE ENCOUNTER
Patient notified and voiced understanding and agreement.      Per pharmacy, please update instructions.     Niurka Menjivar RN BSN  [facility]

## 2023-11-06 NOTE — TELEPHONE ENCOUNTER
Updated prescription authorized by José Vora PA-C.     Niurka Menjivar RN BSN  [facility]

## 2023-11-06 NOTE — TELEPHONE ENCOUNTER
Message sent to provider earlier today asking for updated instructions for Gabapentin 100 mg.    Niurka Menjivar RN BSN  [facility]

## 2023-11-28 ENCOUNTER — ALLIED HEALTH/NURSE VISIT (OUTPATIENT)
Dept: FAMILY MEDICINE | Facility: CLINIC | Age: 83
End: 2023-11-28
Payer: COMMERCIAL

## 2023-11-28 VITALS — DIASTOLIC BLOOD PRESSURE: 60 MMHG | SYSTOLIC BLOOD PRESSURE: 134 MMHG

## 2023-11-28 DIAGNOSIS — I10 WHITE COAT SYNDROME WITH HYPERTENSION: Primary | ICD-10-CM

## 2023-11-28 PROCEDURE — 99207 PR NO CHARGE NURSE ONLY: CPT

## 2023-12-27 DIAGNOSIS — E78.5 HYPERLIPIDEMIA LDL GOAL <130: ICD-10-CM

## 2023-12-27 RX ORDER — SIMVASTATIN 20 MG
TABLET ORAL
Qty: 90 TABLET | Refills: 0 | Status: SHIPPED | OUTPATIENT
Start: 2023-12-27 | End: 2024-03-22

## 2024-01-03 DIAGNOSIS — I10 HYPERTENSION GOAL BP (BLOOD PRESSURE) < 140/90: ICD-10-CM

## 2024-01-03 RX ORDER — CARVEDILOL 12.5 MG/1
TABLET ORAL
Qty: 180 TABLET | Refills: 0 | Status: SHIPPED | OUTPATIENT
Start: 2024-01-03 | End: 2024-04-01

## 2024-02-28 DIAGNOSIS — I10 HYPERTENSION GOAL BP (BLOOD PRESSURE) < 140/90: ICD-10-CM

## 2024-02-28 RX ORDER — FELODIPINE 10 MG/1
10 TABLET, EXTENDED RELEASE ORAL DAILY
Qty: 90 TABLET | Refills: 0 | Status: SHIPPED | OUTPATIENT
Start: 2024-02-28 | End: 2024-05-27

## 2024-03-22 DIAGNOSIS — E78.5 HYPERLIPIDEMIA LDL GOAL <130: ICD-10-CM

## 2024-03-22 RX ORDER — SIMVASTATIN 20 MG
TABLET ORAL
Qty: 90 TABLET | Refills: 0 | Status: SHIPPED | OUTPATIENT
Start: 2024-03-22 | End: 2024-06-03

## 2024-03-30 DIAGNOSIS — I10 HYPERTENSION GOAL BP (BLOOD PRESSURE) < 140/90: ICD-10-CM

## 2024-04-01 RX ORDER — CARVEDILOL 12.5 MG/1
TABLET ORAL
Qty: 180 TABLET | Refills: 0 | Status: SHIPPED | OUTPATIENT
Start: 2024-04-01 | End: 2024-06-03

## 2024-04-22 DIAGNOSIS — I10 HYPERTENSION GOAL BP (BLOOD PRESSURE) < 140/90: ICD-10-CM

## 2024-04-22 RX ORDER — LISINOPRIL 20 MG/1
TABLET ORAL
Qty: 180 TABLET | Refills: 0 | Status: SHIPPED | OUTPATIENT
Start: 2024-04-22 | End: 2024-06-03

## 2024-04-29 ENCOUNTER — NURSE TRIAGE (OUTPATIENT)
Dept: FAMILY MEDICINE | Facility: CLINIC | Age: 84
End: 2024-04-29
Payer: COMMERCIAL

## 2024-04-29 NOTE — TELEPHONE ENCOUNTER
Nurse Triage SBAR    Is this a 2nd Level Triage? NO    Situation: Hematuria    Background: Patient noticed bright blood in her urine yesterday. This has continued through today, but not quite as bright compared to yesterday.    Assessment: Some small clots were noted in urine last night, none today.   She denies any accompanying symptoms including fever, burning with urination, lower pelvic pain, or flank pain.   She denies eating any beets or other foods that could cause her urine to be red.     Protocol Recommended Disposition:   See in Office Today or Tomorrow    Recommendation: Scheduled to see provider tomorrow at her primary clinic. Encouraged patient to increase her water intake today. Should she develop fever or pain, then instructed urgent care evaluation today. Patient expressed understanding.      Does the patient meet one of the following criteria for ADS visit consideration? 16+ years old, with an [facility] PCP     TIP  Providers, please consider if this condition is appropriate for management at one of our Acute and Diagnostic Services sites.     If patient is a good candidate, please use dotphrase <dot>triageresponse and select Refer to ADS to document.    Lary JOHNSONN, RN     Reason for Disposition   All other patients with blood in urine  (Exception: Could be normal menstrual bleeding.)    Additional Information   Negative: Shock suspected (e.g., cold/pale/clammy skin, too weak to stand, low BP, rapid pulse)   Negative: Sounds like a life-threatening emergency to the triager   Negative: Urinary catheter, questions about   Negative: Recent back or abdominal injury   Negative: Recent genital injury   Negative: Unable to urinate (or only a few drops) > 4 hours and bladder feels very full (e.g., palpable bladder or strong urge to urinate)   Negative: Diffuse (all over) muscle pains in the shoulders, arms, legs, and back and dark (cola or tea-colored) or red-colored urine   Negative: Passing pure blood  or large blood clots (i.e., larger than a dime or grape)   Negative: Fever > 100.4 F (38.0 C)   Negative: Patient sounds very sick or weak to the triager   Negative: Known sickle cell disease   Negative: Taking Coumadin (warfarin) or other strong blood thinner, or known bleeding disorder (e.g., thrombocytopenia)   Negative: Side (flank) or back pain present   Negative: Pain or burning with passing urine (urination)   Negative: Patient wants to be seen   Negative: Pink or red-colored urine and likely from food (beets, rhubarb, red food dye) and lasts > 24 hours after stopping food    Protocols used: Urine - Blood In-A-OH

## 2024-04-30 ENCOUNTER — TELEPHONE (OUTPATIENT)
Dept: FAMILY MEDICINE | Facility: CLINIC | Age: 84
End: 2024-04-30

## 2024-04-30 ENCOUNTER — OFFICE VISIT (OUTPATIENT)
Dept: FAMILY MEDICINE | Facility: CLINIC | Age: 84
End: 2024-04-30
Payer: COMMERCIAL

## 2024-04-30 VITALS
OXYGEN SATURATION: 94 % | RESPIRATION RATE: 18 BRPM | HEART RATE: 104 BPM | DIASTOLIC BLOOD PRESSURE: 84 MMHG | SYSTOLIC BLOOD PRESSURE: 144 MMHG | WEIGHT: 148.4 LBS | BODY MASS INDEX: 27.31 KG/M2 | HEIGHT: 62 IN | TEMPERATURE: 97.5 F

## 2024-04-30 DIAGNOSIS — N30.00 ACUTE CYSTITIS WITHOUT HEMATURIA: Primary | ICD-10-CM

## 2024-04-30 DIAGNOSIS — R31.9 HEMATURIA, UNSPECIFIED TYPE: ICD-10-CM

## 2024-04-30 LAB
ALBUMIN UR-MCNC: 100 MG/DL
APPEARANCE UR: CLEAR
BACTERIA #/AREA URNS HPF: ABNORMAL /HPF
BILIRUB UR QL STRIP: NEGATIVE
COLOR UR AUTO: YELLOW
GLUCOSE UR STRIP-MCNC: NEGATIVE MG/DL
HGB UR QL STRIP: NEGATIVE
KETONES UR STRIP-MCNC: NEGATIVE MG/DL
LEUKOCYTE ESTERASE UR QL STRIP: ABNORMAL
NITRATE UR QL: POSITIVE
PH UR STRIP: 6 [PH] (ref 5–7)
RBC #/AREA URNS AUTO: ABNORMAL /HPF
SP GR UR STRIP: 1.02 (ref 1–1.03)
SQUAMOUS #/AREA URNS AUTO: ABNORMAL /LPF
UROBILINOGEN UR STRIP-ACNC: 0.2 E.U./DL
WBC #/AREA URNS AUTO: ABNORMAL /HPF
WBC CLUMPS #/AREA URNS HPF: PRESENT /HPF

## 2024-04-30 PROCEDURE — 81001 URINALYSIS AUTO W/SCOPE: CPT | Performed by: PHYSICIAN ASSISTANT

## 2024-04-30 PROCEDURE — 87186 SC STD MICRODIL/AGAR DIL: CPT | Performed by: PHYSICIAN ASSISTANT

## 2024-04-30 PROCEDURE — 87086 URINE CULTURE/COLONY COUNT: CPT | Performed by: PHYSICIAN ASSISTANT

## 2024-04-30 PROCEDURE — 99213 OFFICE O/P EST LOW 20 MIN: CPT | Performed by: PHYSICIAN ASSISTANT

## 2024-04-30 RX ORDER — CEPHALEXIN 500 MG/1
500 CAPSULE ORAL 2 TIMES DAILY
Qty: 14 CAPSULE | Refills: 0 | Status: SHIPPED | OUTPATIENT
Start: 2024-04-30 | End: 2024-05-07

## 2024-04-30 ASSESSMENT — ENCOUNTER SYMPTOMS
HEMATURIA: 1
ABDOMINAL PAIN: 0
FLANK PAIN: 0
NAUSEA: 0
COLOR CHANGE: 0
BACK PAIN: 0
VOMITING: 0
DIZZINESS: 0
FEVER: 0
CHILLS: 0
DYSURIA: 0
LIGHT-HEADEDNESS: 0
ARTHRALGIAS: 0

## 2024-04-30 ASSESSMENT — PAIN SCALES - GENERAL: PAINLEVEL: NO PAIN (0)

## 2024-04-30 NOTE — PATIENT INSTRUCTIONS
Your initial lab study shows a urinary tract infection.  For treatment you have been prescribed Keflex, an antibiotic.  Please take as prescribed.  You do not need to repeat the urine test unless you have ongoing symptoms of blood in the urine, urinary frequency/urgency, or painful urination.  Please reach out with questions or concerns.

## 2024-04-30 NOTE — PROGRESS NOTES
Assessment & Plan     Hematuria, unspecified type  Acute cystitis without hematuria  Patient is an 83-year-old female who presents to clinic due to 2 days of hematuria.  Patient notes hematuria has resolved today.  No history of kidney stones or hematuria in the past.  Vital signs significant for mild tachycardia.  Physical exam without acute abnormalities.  Patient initially unable to provide urine sample for evaluation.  Initial plan was to assess hematuria today and reevaluate in 1 week as it appears to be resolving.     At discharge, patient did leave sample which showed UTI.  No hematuria present.  Keflex prescribed.  Attempted to contact patient myself, left voicemail, and RN team also attempted to contact patient with message to return call to clinic regarding results.    Updated AVS sent via mail.    - UA Macroscopic with reflex to Microscopic and Culture - Lab Collect; Future  - UA Macroscopic with reflex to Microscopic and Culture - Lab Collect  - UA Macroscopic with reflex to Microscopic and Culture - Lab Collect; Future  - Urine Microscopic Exam  - Urine Culture  - cephALEXin (KEFLEX) 500 MG capsule; Take 1 capsule (500 mg) by mouth 2 times daily for 7 days          See Patient Instructions    Subjective   Claire is a 83 year old, presenting for the following health issues:  Blood In Urine  See Nurse Triage Note. Patient seen blood in her urine the past two days but none today. No pain associated with this blood in urine.       4/30/2024     2:57 PM   Additional Questions   Roomed by Hilary JONES MA   Accompanied by No one         4/30/2024     2:57 PM   Patient Reported Additional Medications   Patient reports taking the following new medications None     HPI     Patient notes blood in the urine X 2 days. At first it was bright red and then it resolved today.  Patient denies fever, abdominal pain, urinary frequency/urgency, dysuria.  No history of kidney stones.  No history of blood in the urine in the  "past.    Review of Systems   Constitutional:  Negative for chills and fever.   Gastrointestinal:  Negative for abdominal pain, nausea and vomiting.   Genitourinary:  Positive for hematuria. Negative for dysuria, flank pain, vaginal bleeding and vaginal discharge.   Musculoskeletal:  Negative for arthralgias and back pain.   Skin:  Negative for color change and rash.   Neurological:  Negative for dizziness and light-headedness.   All other systems reviewed and are negative.          Objective    BP (!) 144/84   Pulse 104   Temp 97.5  F (36.4  C) (Temporal)   Resp 18   Ht 1.581 m (5' 2.25\")   Wt 67.3 kg (148 lb 6.4 oz)   LMP  (LMP Unknown)   SpO2 94%   Breastfeeding No   BMI 26.93 kg/m    Body mass index is 26.93 kg/m .  Physical Exam  Vitals and nursing note reviewed.   Constitutional:       General: She is not in acute distress.     Appearance: Normal appearance.   HENT:      Head: Normocephalic and atraumatic.   Eyes:      Extraocular Movements: Extraocular movements intact.      Pupils: Pupils are equal, round, and reactive to light.   Cardiovascular:      Rate and Rhythm: Normal rate and regular rhythm.      Heart sounds: Normal heart sounds.   Pulmonary:      Effort: Pulmonary effort is normal.      Breath sounds: Normal breath sounds.   Abdominal:      General: Bowel sounds are normal.      Palpations: Abdomen is soft.      Tenderness: There is no abdominal tenderness. There is no right CVA tenderness, left CVA tenderness, guarding or rebound.   Musculoskeletal:         General: Normal range of motion.      Cervical back: Normal range of motion.   Skin:     General: Skin is warm and dry.   Neurological:      General: No focal deficit present.      Mental Status: She is alert.   Psychiatric:         Mood and Affect: Mood normal.         Behavior: Behavior normal.                    Signed Electronically by: Luiza Palma PA-C    "

## 2024-04-30 NOTE — TELEPHONE ENCOUNTER
----- Message from Luiza Palma PA-C sent at 4/30/2024  4:01 PM CDT -----  Team - please call patient with results: Patient has urinary tract infection. I have sent antibiotics: Keflex, to patient's pharmacy.  She does not need to complete the repeat urine study unless symptoms do not improve with treatment.    Luiza Palma PA-C on 4/30/2024 at 4:01 PM

## 2024-04-30 NOTE — TELEPHONE ENCOUNTER
Spoke with patient. Relayed provider's message as written. Patient verbalized understanding and has no further questions at this time.    ELIZABETH DawnN RN  [facility]

## 2024-04-30 NOTE — TELEPHONE ENCOUNTER
Attempted to call patient with number on file to relay provider's message below with no answer, left voicemail to call clinic back at [phone].    Brandi Gomes RN on 4/30/2024 at 4:20 PM

## 2024-05-01 LAB — BACTERIA UR CULT: ABNORMAL

## 2024-06-01 DIAGNOSIS — G62.9 NEUROPATHY: ICD-10-CM

## 2024-06-03 ENCOUNTER — VIRTUAL VISIT (OUTPATIENT)
Dept: FAMILY MEDICINE | Facility: CLINIC | Age: 84
End: 2024-06-03
Payer: COMMERCIAL

## 2024-06-03 DIAGNOSIS — G62.9 NEUROPATHY: ICD-10-CM

## 2024-06-03 DIAGNOSIS — E78.5 HYPERLIPIDEMIA LDL GOAL <130: ICD-10-CM

## 2024-06-03 DIAGNOSIS — I10 HYPERTENSION GOAL BP (BLOOD PRESSURE) < 140/90: ICD-10-CM

## 2024-06-03 PROCEDURE — G2211 COMPLEX E/M VISIT ADD ON: HCPCS | Mod: 95 | Performed by: PHYSICIAN ASSISTANT

## 2024-06-03 PROCEDURE — 99214 OFFICE O/P EST MOD 30 MIN: CPT | Mod: 95 | Performed by: PHYSICIAN ASSISTANT

## 2024-06-03 RX ORDER — FELODIPINE 10 MG/1
10 TABLET, EXTENDED RELEASE ORAL DAILY
Qty: 90 TABLET | Refills: 1 | Status: SHIPPED | OUTPATIENT
Start: 2024-06-03

## 2024-06-03 RX ORDER — SIMVASTATIN 20 MG
20 TABLET ORAL DAILY
Qty: 90 TABLET | Refills: 1 | Status: SHIPPED | OUTPATIENT
Start: 2024-06-03

## 2024-06-03 RX ORDER — CARVEDILOL 12.5 MG/1
12.5 TABLET ORAL 2 TIMES DAILY WITH MEALS
Qty: 180 TABLET | Refills: 1 | Status: SHIPPED | OUTPATIENT
Start: 2024-06-03

## 2024-06-03 RX ORDER — GABAPENTIN 300 MG/1
300 CAPSULE ORAL 3 TIMES DAILY
Qty: 270 CAPSULE | Refills: 1 | Status: SHIPPED | OUTPATIENT
Start: 2024-06-03

## 2024-06-03 RX ORDER — GABAPENTIN 100 MG/1
200 CAPSULE ORAL 3 TIMES DAILY
Qty: 180 CAPSULE | Refills: 0 | OUTPATIENT
Start: 2024-06-03

## 2024-06-03 RX ORDER — LISINOPRIL 20 MG/1
20 TABLET ORAL 2 TIMES DAILY
Qty: 180 TABLET | Refills: 1 | Status: SHIPPED | OUTPATIENT
Start: 2024-06-03

## 2024-06-03 NOTE — PROGRESS NOTES
Claire is a 83 year old who is being evaluated via a billable video visit.    How would you like to obtain your AVS? Mail a copy  If the video visit is dropped, the invitation should be resent by: Text to cell phone: [phone]  Will anyone else be joining your video visit? No        Subjective   Claire is a 83 year old, presenting for the following health issues:  Recheck Medication and Hypertension  Patient stated she would like to discuss her dosage for the gabapentin. She has been experiencing numbness again.   LINK TEXTED TO PATIENT @ 10AM -Patient is okay with getting a phone call if video wont work. Patients phone number is: [phone].       6/3/2024    10:03 AM   Additional Questions   Roomed by Hilary JONES MA   Accompanied by No one         6/3/2024    10:03 AM   Patient Reported Additional Medications   Patient reports taking the following new medications None     Video Start Time: 5:01 PM    HPI     Hypertension Follow-up    Do you check your blood pressure regularly outside of the clinic? Yes   Are you following a low salt diet? Yes  Are your blood pressures ever more than 140 on the top number (systolic) OR more   than 90 on the bottom number (diastolic), for example 140/90? No-130/70      Neuropathy persists. Gabapentin seems to impact it some.   No chest pain/sob/palpitations/dizziness/ha's  No bowel changes.  Home blood pressure numbers have been well with in goal range.     Review of Systems  Constitutional, HEENT, cardiovascular, pulmonary, GI, , musculoskeletal, neuro, skin, endocrine and psych systems are negative, except as otherwise noted.      Objective           Vitals:  No vitals were obtained today due to virtual visit.    Physical Exam   GENERAL: alert and no distress  EYES: Eyes grossly normal to inspection.  No discharge or erythema, or obvious scleral/conjunctival abnormalities.  RESP: No audible wheeze, cough, or visible cyanosis.    SKIN: Visible skin clear. No significant rash,  abnormal pigmentation or lesions.  NEURO: Cranial nerves grossly intact.  Mentation and speech appropriate for age.  PSYCH: Appropriate affect, tone, and pace of words    Claire was seen today for recheck medication, hypertension and neuropathy.    Diagnoses and all orders for this visit:    Neuropathy  -     gabapentin (NEURONTIN) 300 MG capsule; Take 1 capsule (300 mg) by mouth 3 times daily    Hypertension goal BP (blood pressure) < 140/90  -     carvedilol (COREG) 12.5 MG tablet; Take 1 tablet (12.5 mg) by mouth 2 times daily (with meals)  -     felodipine ER (PLENDIL) 10 MG 24 hr tablet; Take 1 tablet (10 mg) by mouth daily  -     lisinopril (ZESTRIL) 20 MG tablet; Take 1 tablet (20 mg) by mouth 2 times daily    Hyperlipidemia LDL goal <130  -     simvastatin (ZOCOR) 20 MG tablet; Take 1 tablet (20 mg) by mouth daily      Inc dose of gabapentin to 300 mg tid   Continue all other meds.  work on lifestyle modification  Recheck in 6 mos       Video-Visit Details    Type of service:  Video Visit   Video End Time:5:12 PM  Originating Location (pt. Location): Home    Distant Location (provider location):  On-site  Platform used for Video Visit: Randy  Signed Electronically by: José Vora PA-C

## 2024-06-17 PROBLEM — Z71.89 ADVANCED DIRECTIVES, COUNSELING/DISCUSSION: Status: RESOLVED | Noted: 2017-06-12 | Resolved: 2024-06-17

## 2024-07-29 ENCOUNTER — LAB (OUTPATIENT)
Dept: LAB | Facility: CLINIC | Age: 84
End: 2024-07-29
Payer: COMMERCIAL

## 2024-07-29 ENCOUNTER — TELEPHONE (OUTPATIENT)
Dept: FAMILY MEDICINE | Facility: CLINIC | Age: 84
End: 2024-07-29

## 2024-07-29 ENCOUNTER — VIRTUAL VISIT (OUTPATIENT)
Dept: FAMILY MEDICINE | Facility: CLINIC | Age: 84
End: 2024-07-29
Payer: COMMERCIAL

## 2024-07-29 DIAGNOSIS — E03.8 OTHER SPECIFIED HYPOTHYROIDISM: Primary | ICD-10-CM

## 2024-07-29 DIAGNOSIS — R31.9 HEMATURIA, UNSPECIFIED TYPE: ICD-10-CM

## 2024-07-29 DIAGNOSIS — E78.5 HYPERLIPIDEMIA LDL GOAL <130: ICD-10-CM

## 2024-07-29 DIAGNOSIS — I10 WHITE COAT SYNDROME WITH HYPERTENSION: ICD-10-CM

## 2024-07-29 DIAGNOSIS — N39.0 ACUTE UTI (URINARY TRACT INFECTION): Primary | ICD-10-CM

## 2024-07-29 LAB
ALBUMIN UR-MCNC: NEGATIVE MG/DL
ANION GAP SERPL CALCULATED.3IONS-SCNC: 10 MMOL/L (ref 7–15)
APPEARANCE UR: ABNORMAL
BACTERIA #/AREA URNS HPF: ABNORMAL /HPF
BILIRUB UR QL STRIP: NEGATIVE
BUN SERPL-MCNC: 18.4 MG/DL (ref 8–23)
CALCIUM SERPL-MCNC: 9.4 MG/DL (ref 8.8–10.4)
CHLORIDE SERPL-SCNC: 104 MMOL/L (ref 98–107)
CHOLEST SERPL-MCNC: 151 MG/DL
COLOR UR AUTO: YELLOW
CREAT SERPL-MCNC: 0.55 MG/DL (ref 0.51–0.95)
EGFRCR SERPLBLD CKD-EPI 2021: 89 ML/MIN/1.73M2
FASTING STATUS PATIENT QL REPORTED: YES
FASTING STATUS PATIENT QL REPORTED: YES
GLUCOSE SERPL-MCNC: 110 MG/DL (ref 70–99)
GLUCOSE UR STRIP-MCNC: NEGATIVE MG/DL
HCO3 SERPL-SCNC: 26 MMOL/L (ref 22–29)
HDLC SERPL-MCNC: 77 MG/DL
HGB UR QL STRIP: NEGATIVE
KETONES UR STRIP-MCNC: NEGATIVE MG/DL
LDLC SERPL CALC-MCNC: 56 MG/DL
LEUKOCYTE ESTERASE UR QL STRIP: ABNORMAL
NITRATE UR QL: NEGATIVE
NONHDLC SERPL-MCNC: 74 MG/DL
PH UR STRIP: 7 [PH] (ref 5–7)
POTASSIUM SERPL-SCNC: 4.2 MMOL/L (ref 3.4–5.3)
RBC #/AREA URNS AUTO: ABNORMAL /HPF
SODIUM SERPL-SCNC: 140 MMOL/L (ref 135–145)
SP GR UR STRIP: 1.02 (ref 1–1.03)
SQUAMOUS #/AREA URNS AUTO: ABNORMAL /LPF
TRIGL SERPL-MCNC: 90 MG/DL
TSH SERPL DL<=0.005 MIU/L-ACNC: 0.9 UIU/ML (ref 0.3–4.2)
UROBILINOGEN UR STRIP-ACNC: 0.2 E.U./DL
WBC #/AREA URNS AUTO: ABNORMAL /HPF
WBC CLUMPS #/AREA URNS HPF: PRESENT /HPF

## 2024-07-29 PROCEDURE — 80061 LIPID PANEL: CPT

## 2024-07-29 PROCEDURE — 81001 URINALYSIS AUTO W/SCOPE: CPT

## 2024-07-29 PROCEDURE — 36415 COLL VENOUS BLD VENIPUNCTURE: CPT

## 2024-07-29 PROCEDURE — 80048 BASIC METABOLIC PNL TOTAL CA: CPT

## 2024-07-29 PROCEDURE — 99441 PR PHYSICIAN TELEPHONE EVALUATION 5-10 MIN: CPT | Mod: 93 | Performed by: PHYSICIAN ASSISTANT

## 2024-07-29 PROCEDURE — 87086 URINE CULTURE/COLONY COUNT: CPT

## 2024-07-29 PROCEDURE — 84443 ASSAY THYROID STIM HORMONE: CPT

## 2024-07-29 RX ORDER — CEFDINIR 300 MG/1
300 CAPSULE ORAL 2 TIMES DAILY
Qty: 14 CAPSULE | Refills: 0 | Status: SHIPPED | OUTPATIENT
Start: 2024-07-29 | End: 2024-08-05

## 2024-07-29 ASSESSMENT — ENCOUNTER SYMPTOMS
FATIGUE: 0
ACTIVITY CHANGE: 0
FREQUENCY: 0
FEVER: 0
HEMATURIA: 0
DYSURIA: 0

## 2024-07-29 NOTE — TELEPHONE ENCOUNTER
----- Message from Luiza Palma sent at 7/29/2024 11:55 AM CDT -----  Team - please call patient with results.    Patient did not complete virtual or in person evaluation for urinary symptoms, but urinalysis was completed today in lab.  It appears patient has UTI.  She should complete virtual visit today so that we have a better understanding of symptoms prior to providing treatment.  Please schedule with myself or available provider.    Luiza Palma PA-C on 7/29/2024 at 11:55 AM

## 2024-07-29 NOTE — TELEPHONE ENCOUNTER
Called patient and left a voicemail to return our call to the clinic.       Fatemeh Mauricio RN on 7/29/2024 at 11:59 AM

## 2024-07-29 NOTE — TELEPHONE ENCOUNTER
Patient returned call to clinic, writer relayed provider's message below. Patient is agreeable to telephone appointment today 07/29/24 with available provider, notes that she had come in today just for lab work prior to appointment next week.    Patient states that she does not have any symptoms but notes that this UTI has been recurrent.    Writer scheduled appointment with interpreting provider of labs today over the phone, no further questions.    ELIZABETH CampbellN RN  [facility]

## 2024-07-29 NOTE — PROGRESS NOTES
Claire is a 84 year old who is being evaluated via a billable telephone visit.    What phone number would you like to be contacted at? [phone]  How would you like to obtain your AVS? No AVS needed  Originating Location (pt. Location): Home    Distant Location (provider location):  Off-site    Assessment & Plan     Acute UTI (urinary tract infection)  Patient is an 84-year-old female who completed lab work today showing concerns for UTI.  Patient denies any symptoms including urinary frequency/urgency, hematuria, dysuria, abdominal pain, fever, fatigue, weakness.  Culture in process.  Based on prior culture and previous treatment within the last 3-4 months with cefadroxil and Keflex, will proceed with cefdinir treatment.  Recommended follow-up for any new or worsening symptoms.  - cefdinir (OMNICEF) 300 MG capsule; Take 1 capsule (300 mg) by mouth 2 times daily for 7 days      See Patient Instructions    Subjective   Claire is a 84 year old, presenting for the following health issues:  Urinary Problem        7/29/2024     1:26 PM   Additional Questions   Roomed by Leanne BROUSSARD         7/29/2024     1:26 PM   Patient Reported Additional Medications   Patient reports taking the following new medications None per patient     History of Present Illness       Reason for visit:  Uti  Symptom intensity:  Mild  Had these symptoms before:  No    She eats 2-3 servings of fruits and vegetables daily.She consumes 0 sweetened beverage(s) daily.She exercises with enough effort to increase her heart rate 9 or less minutes per day.  She exercises with enough effort to increase her heart rate 3 or less days per week.   She is taking medications regularly.     Patient notes she is feeling well but completed lab work today with concerns for UTI. She was in the ED 6/30/34 with acute cystitis and completed antibiotics as prescribed with complete symptom resolution.  At that time she was feeling generally weak.  No current weakness.         Review of Systems   Constitutional:  Negative for activity change, fatigue and fever.   Genitourinary:  Negative for dysuria, frequency, hematuria and urgency.           Objective           Vitals:  No vitals were obtained today due to virtual visit.    Physical Exam   General: Alert and no distress //Respiratory: No audible wheeze, cough, or shortness of breath // Psychiatric:  Appropriate affect, tone, and pace of words      Results for orders placed or performed in visit on 07/29/24   UA Macroscopic with reflex to Microscopic and Culture - Lab Collect     Status: Abnormal    Specimen: Urine, Midstream   Result Value Ref Range    Color Urine Yellow Colorless, Straw, Light Yellow, Yellow    Appearance Urine Slightly Cloudy (A) Clear    Glucose Urine Negative Negative mg/dL    Bilirubin Urine Negative Negative    Ketones Urine Negative Negative mg/dL    Specific Gravity Urine 1.020 1.003 - 1.035    Blood Urine Negative Negative    pH Urine 7.0 5.0 - 7.0    Protein Albumin Urine Negative Negative mg/dL    Urobilinogen Urine 0.2 0.2, 1.0 E.U./dL    Nitrite Urine Negative Negative    Leukocyte Esterase Urine Small (A) Negative   UA Microscopic with Reflex to Culture     Status: Abnormal   Result Value Ref Range    Bacteria Urine Moderate (A) None Seen /HPF    RBC Urine 0-2 0-2 /HPF /HPF    WBC Urine 25-50 (A) 0-5 /HPF /HPF    Squamous Epithelials Urine Few (A) None Seen /LPF    WBC Clumps Urine Present (A) None Seen /HPF           Phone call duration: 7 minutes  Signed Electronically by: Luiza Palma PA-C

## 2024-07-29 NOTE — PATIENT INSTRUCTIONS
Urinary Tract Infection (UTI) in Women: Care Instructions  Overview     A urinary tract infection (UTI) is an infection caused by bacteria. It can happen anywhere in the urinary tract. A UTI can happen in the:  Kidneys.  Ureters, the tubes that connect the kidneys to the bladder.  Bladder.  Urethra, where the urine comes out.  Most UTIs are bladder infections. They often cause pain or burning when you urinate.  Most UTIs can be cured with antibiotics. If you are prescribed antibiotics, be sure to complete your treatment so that the infection does not get worse.  Follow-up care is a key part of your treatment and safety. Be sure to make and go to all appointments, and call your doctor if you are having problems. It's also a good idea to know your test results and keep a list of the medicines you take.  How can you care for yourself at home?  Take your antibiotics as directed. Do not stop taking them just because you feel better. You need to take the full course of antibiotics.  Drink extra water and other fluids for the next day or two. This will help make the urine less concentrated and help wash out the bacteria that are causing the infection. (If you have kidney, heart, or liver disease and have to limit fluids, talk with your doctor before you increase the amount of fluids you drink.)  Avoid drinks that are carbonated or have caffeine. They can irritate the bladder.  Urinate often. Try to empty your bladder each time.  To relieve pain, take a hot bath or lay a heating pad set on low over your lower belly or genital area. Never go to sleep with a heating pad in place.  To prevent UTIs  Drink plenty of water each day. This helps you urinate often, which clears bacteria from your system. (If you have kidney, heart, or liver disease and have to limit fluids, talk with your doctor before you increase the amount of fluids you drink.)  Urinate when you need to.  If you are sexually active, urinate right after you have  "sex.  Change sanitary pads often.  Avoid douches, bubble baths, feminine hygiene sprays, and other feminine hygiene products that have deodorants.  After going to the bathroom, wipe from front to back.  When should you call for help?   Call your doctor now or seek immediate medical care if:    You have new or worse fever, chills, nausea, or vomiting.     You have new pain in your back just below your rib cage. This is called flank pain.     There is new blood or pus in your urine.     You have any problems with your antibiotic medicine.   Watch closely for changes in your health, and be sure to contact your doctor if:    You are not getting better after taking an antibiotic for 2 days.     Your symptoms go away but then come back.   Where can you learn more?  Go to [email or web address]/patiented  Enter K848 in the search box to learn more about \"Urinary Tract Infection (UTI) in Women: Care Instructions.\"  Current as of: November 15, 2023               Content Version: 14.0    0484-9427 [employer].   Care instructions adapted under license by your healthcare professional. If you have questions about a medical condition or this instruction, always ask your healthcare professional. [employer] disclaims any warranty or liability for your use of this information.      "

## 2024-07-30 LAB — BACTERIA UR CULT: NORMAL

## 2024-08-05 ENCOUNTER — OFFICE VISIT (OUTPATIENT)
Dept: FAMILY MEDICINE | Facility: CLINIC | Age: 84
End: 2024-08-05
Payer: COMMERCIAL

## 2024-08-05 VITALS
RESPIRATION RATE: 24 BRPM | BODY MASS INDEX: 27.97 KG/M2 | TEMPERATURE: 97.7 F | HEIGHT: 62 IN | HEART RATE: 75 BPM | WEIGHT: 152 LBS | DIASTOLIC BLOOD PRESSURE: 64 MMHG | SYSTOLIC BLOOD PRESSURE: 120 MMHG | OXYGEN SATURATION: 91 %

## 2024-08-05 DIAGNOSIS — Z00.00 ENCOUNTER FOR ANNUAL WELLNESS VISIT (AWV) IN MEDICARE PATIENT: Primary | ICD-10-CM

## 2024-08-05 DIAGNOSIS — I10 HYPERTENSION GOAL BP (BLOOD PRESSURE) < 140/90: ICD-10-CM

## 2024-08-05 PROCEDURE — G0439 PPPS, SUBSEQ VISIT: HCPCS | Performed by: PHYSICIAN ASSISTANT

## 2024-08-05 SDOH — HEALTH STABILITY: PHYSICAL HEALTH: ON AVERAGE, HOW MANY MINUTES DO YOU ENGAGE IN EXERCISE AT THIS LEVEL?: 10 MIN

## 2024-08-05 SDOH — HEALTH STABILITY: PHYSICAL HEALTH: ON AVERAGE, HOW MANY DAYS PER WEEK DO YOU ENGAGE IN MODERATE TO STRENUOUS EXERCISE (LIKE A BRISK WALK)?: 1 DAY

## 2024-08-05 ASSESSMENT — SOCIAL DETERMINANTS OF HEALTH (SDOH): HOW OFTEN DO YOU GET TOGETHER WITH FRIENDS OR RELATIVES?: MORE THAN THREE TIMES A WEEK

## 2024-08-05 NOTE — PROGRESS NOTES
Preventive Care Visit  [facility] CHERYL Vora PA-C, Family Medicine  Aug 5, 2024      Nancy Rangel is a 84 year old, presenting for the following:  Wellness Visit and Health Maintenance (Patient is not fasting)        8/5/2024    10:04 AM   Additional Questions   Roomed by Pattie Quintanilla CMA   Accompanied by none         8/5/2024    10:04 AM   Patient Reported Additional Medications   Patient reports taking the following new medications none         Health Care Directive  Patient does not have a Health Care Directive or Living Will: Discussed advance care planning with patient; however, patient declined at this time.    HPI  Recheck of htn   No chest pain/sob/palpitations/dizziness/ha's  Reviewed recent lab work. All labs look good/therapeutic or normal.       8/5/2024   General Health   How would you rate your overall physical health? Good   Feel stress (tense, anxious, or unable to sleep) Not at all            8/5/2024   Nutrition   Diet: Regular (no restrictions)            8/5/2024   Exercise   Days per week of moderate/strenous exercise 1 day   Average minutes spent exercising at this level 10 min      (!) EXERCISE CONCERN      8/5/2024   Social Factors   Frequency of gathering with friends or relatives More than three times a week   Worry food won't last until get money to buy more No   Food not last or not have enough money for food? No   Do you have housing? (Housing is defined as stable permanent housing and does not include staying ouside in a car, in a tent, in an abandoned building, in an overnight shelter, or couch-surfing.) Yes   Are you worried about losing your housing? No   Lack of transportation? No   Unable to get utilities (heat,electricity)? No            8/5/2024   Fall Risk   Fallen 2 or more times in the past year? No   Trouble with walking or balance? No             8/5/2024   Activities of Daily Living- Home Safety   Needs help with the following daily  activites Transportation   Safety concerns in the home None of the above            2024   Dental   Dentist two times every year? (!) NO            2024   Hearing Screening   Hearing concerns? None of the above            2024   Driving Risk Screening   Patient/family members have concerns about driving (!) YES             2024   General Alertness/Fatigue Screening   Have you been more tired than usual lately? No            2024   Urinary Incontinence Screening   Bothered by leaking urine in past 6 months No            2024   TB Screening   Were you born outside of the [place]? No            Today's PHQ-2 Score:       2024    10:06 AM   PHQ-2 (  [employer])   Q1: Little interest or pleasure in doing things 0   Q2: Feeling down, depressed or hopeless 0   PHQ-2 Score 0   Q1: Little interest or pleasure in doing things Not at all   Q2: Feeling down, depressed or hopeless Not at all   PHQ-2 Score 0           2024   Substance Use   Alcohol more than 3/day or more than 7/wk No   Do you have a current opioid prescription? No   How severe/bad is pain from 1 to 10? 0/10 (No Pain)   Do you use any other substances recreationally? No        Social History     Tobacco Use    Smoking status: Former     Current packs/day: 0.00     Average packs/day: 1 pack/day for 46.1 years (46.1 ttl pk-yrs)     Types: Cigarettes     Start date: 1965     Quit date: 2011     Years since quittin.5     Passive exposure: Past    Smokeless tobacco: Never   Vaping Use    Vaping status: Never Used   Substance Use Topics    Alcohol use: Yes     Comment: jessika and water 3-4x/week    Drug use: No          Mammogram Screening - Mammography discussed and declined          Reviewed and updated as needed this visit by Provider                    Past Medical History:   Diagnosis Date    Cataract     DJD (degenerative joint disease) 2012    Hypertension     Osteopenia     Pure hypercholesterolemia     Retinal  detachment     right eye    Unspecified hypothyroidism     Unspecified visual loss     L eye     Past Surgical History:   Procedure Laterality Date    CATARACT IOL, RT/LT      COLPORRHAPHY ANTERIOR N/A 03/30/2017    Procedure: COLPORRHAPHY ANTERIOR;  Surgeon: Rene Mitchell MD;  Location: [facility] OR    CYSTOSCOPY, SLING TRANSVAGINAL N/A 03/30/2017    Procedure: CYSTOSCOPY, SLING TRANSVAGINAL;  Surgeon: Rene Mitchell MD;  Location: [facility] OR    D & C      LAPAROSCOPIC APPENDECTOMY N/A 02/08/2018    Procedure: LAPAROSCOPIC APPENDECTOMY;  Laparoscopic appendectomy;  Surgeon: Glenn Pacheco MD;  Location: [facility]    LASER YAG CAPSULOTOMY Right 10/11/2023    PHACOEMULSIFICATION WITH STANDARD INTRAOCULAR LENS IMPLANT  05/2015    right eye    RETINAL REATTACHMENT  remotely    right eye    TONSILLECTOMY      TUBAL LIGATION       BP Readings from Last 3 Encounters:   08/05/24 120/64   04/30/24 (!) 144/84   11/28/23 134/60    Wt Readings from Last 3 Encounters:   08/05/24 68.9 kg (152 lb)   04/30/24 67.3 kg (148 lb 6.4 oz)   08/02/23 66.1 kg (145 lb 12.8 oz)                  Patient Active Problem List   Diagnosis    White coat syndrome with hypertension    Hyperlipidemia LDL goal <130    DJD (degenerative joint disease)    Pseudophakia, Yag Caps, od    Blind hypotensive eye, left    History of retinal detachment, od    Posterior vitreous detachment, right    Other specified hypothyroidism    Appendicitis    S/P laparoscopic appendectomy    Ocular hypertension of right eye     Past Surgical History:   Procedure Laterality Date    CATARACT IOL, RT/LT      COLPORRHAPHY ANTERIOR N/A 03/30/2017    Procedure: COLPORRHAPHY ANTERIOR;  Surgeon: Rene Mitchell MD;  Location: [facility]    CYSTOSCOPY, SLING TRANSVAGINAL N/A 03/30/2017    Procedure: CYSTOSCOPY, SLING TRANSVAGINAL;  Surgeon: Rene Mitchell MD;  Location: [facility]    D & C      LAPAROSCOPIC APPENDECTOMY N/A 02/08/2018    Procedure: LAPAROSCOPIC APPENDECTOMY;   Laparoscopic appendectomy;  Surgeon: Glenn Pacheco MD;  Location: [facility]    LASER YAG CAPSULOTOMY Right 10/11/2023    PHACOEMULSIFICATION WITH STANDARD INTRAOCULAR LENS IMPLANT  2015    right eye    RETINAL REATTACHMENT  remotely    right eye    TONSILLECTOMY      TUBAL LIGATION         Social History     Tobacco Use    Smoking status: Former     Current packs/day: 0.00     Average packs/day: 1 pack/day for 46.1 years (46.1 ttl pk-yrs)     Types: Cigarettes     Start date: 1965     Quit date: 2011     Years since quittin.5     Passive exposure: Past    Smokeless tobacco: Never   Substance Use Topics    Alcohol use: Yes     Comment: jessika and water 3-4x/week     Family History   Problem Relation Age of Onset    Alzheimer Disease Mother     Osteoporosis Mother     Hypertension Mother     Prostate Cancer Father     Osteoporosis Maternal Grandmother     Cancer Brother     Eye Disorder Brother     Eye Disorder Sister     Eye Disorder Son     Eye Disorder Brother     Eye Disorder Brother     Cerebrovascular Disease Paternal Grandmother     Diabetes No family hx of     Thyroid Disease No family hx of     Glaucoma No family hx of     Macular Degeneration No family hx of          Current Outpatient Medications   Medication Sig Dispense Refill    CALCIUM 500 OR 1 per day       carvedilol (COREG) 12.5 MG tablet Take 1 tablet (12.5 mg) by mouth 2 times daily (with meals) 180 tablet 1    cefdinir (OMNICEF) 300 MG capsule Take 1 capsule (300 mg) by mouth 2 times daily for 7 days 14 capsule 0    cholecalciferol (VITAMIN D) 1000 UNIT tablet Take 1 tablet by mouth daily.      felodipine ER (PLENDIL) 10 MG 24 hr tablet Take 1 tablet (10 mg) by mouth daily 90 tablet 1    furosemide (LASIX) 20 MG tablet Take 1 tablet (20 mg) by mouth daily 90 tablet 3    gabapentin (NEURONTIN) 300 MG capsule Take 1 capsule (300 mg) by mouth 3 times daily 270 capsule 1    levothyroxine (SYNTHROID/LEVOTHROID) 88 MCG tablet Take  1 tablet by mouth once daily 90 tablet 1    lisinopril (ZESTRIL) 20 MG tablet Take 1 tablet (20 mg) by mouth 2 times daily 180 tablet 1    Multiple Vitamin (MULTI-DAY VITAMINS PO) Take  by mouth. 1 tab daily      simvastatin (ZOCOR) 20 MG tablet Take 1 tablet (20 mg) by mouth daily 90 tablet 1     Allergies   Allergen Reactions    Amlodipine Swelling     Side effect / swelling    Hctz [Hydrochlorothiazide]      Low sodium       Recent Labs   Lab Test 07/29/24  0944 07/26/23  0901 11/08/22  0820 08/01/22  1147 07/25/22  0924 07/14/21  1503 06/24/20  0824 07/11/19  0852   A1C  --   --   --  5.0  --   --   --   --    LDL 56 64  --   --  54   < > 63  --    HDL 77 94  --   --  105   < > 79  --    TRIG 90 96  --   --  84   < > 160*  --    CR 0.55 0.60  --   --  0.59   < > 0.83 0.88   GFRESTIMATED 89 87  --   --  88   < > 66 62   GFRESTBLACK  --   --   --   --   --   --  76 72   POTASSIUM 4.2 4.7  --   --  4.2   < > 4.5 5.1   TSH 0.90  --  1.05 4.39*  --    < > 5.50*  --     < > = values in this interval not displayed.      Current providers sharing in care for this patient include:  Patient Care Team:  José Vora PA-C as PCP - General (Family Medicine)  José Vora PA-C as Assigned PCP  Shemar Jesus MD as Assigned Surgical Provider    The following health maintenance items are reviewed in Epic and correct as of today:  Health Maintenance   Topic Date Due    ZOSTER IMMUNIZATION (1 of 2) Never done    RSV VACCINE (Pregnancy & 60+) (1 - 1-dose 60+ series) Never done    COVID-19 Vaccine (1 - 2023-24 season) Never done    MEDICARE ANNUAL WELLNESS VISIT  08/02/2024    INFLUENZA VACCINE (1) 09/01/2024    BMP  07/29/2025    LIPID  07/29/2025    TSH W/FREE T4 REFLEX  07/29/2025    ANNUAL REVIEW OF HM ORDERS  08/05/2025    FALL RISK ASSESSMENT  08/05/2025    DTAP/TDAP/TD IMMUNIZATION (2 - Td or Tdap) 05/18/2026    DEXA  04/26/2027    ADVANCE CARE PLANNING  08/05/2029    PHQ-2 (once per calendar year)  Completed  "   Pneumococcal Vaccine: 65+ Years  Completed    IPV IMMUNIZATION  Aged Out    HPV IMMUNIZATION  Aged Out    MENINGITIS IMMUNIZATION  Aged Out    RSV MONOCLONAL ANTIBODY  Aged Out         Review of Systems  Constitutional, HEENT, cardiovascular, pulmonary, GI, , musculoskeletal, neuro, skin, endocrine and psych systems are negative, except as otherwise noted.     Objective    Exam  /64   Pulse 75   Temp 97.7  F (36.5  C) (Oral)   Resp 24   Ht 1.575 m (5' 2\")   Wt 68.9 kg (152 lb)   LMP  (LMP Unknown)   SpO2 91%   BMI 27.80 kg/m     Estimated body mass index is 27.8 kg/m  as calculated from the following:    Height as of this encounter: 1.575 m (5' 2\").    Weight as of this encounter: 68.9 kg (152 lb).    Physical Exam  GENERAL: alert and no distress  EYES: Eyes grossly normal to inspection, PERRL and conjunctivae and sclerae normal  HENT: ear canals and TM's normal, nose and mouth without ulcers or lesions  NECK: no adenopathy, no asymmetry, masses, or scars  RESP: lungs clear to auscultation - no rales, rhonchi or wheezes  CV: regular rate and rhythm, normal S1 S2, no S3 or S4, no murmur, click or rub, no peripheral edema  ABDOMEN: soft, nontender, no hepatosplenomegaly, no masses and bowel sounds normal  MS: no gross musculoskeletal defects noted, no edema  SKIN: no suspicious lesions or rashes  NEURO: Normal strength and tone, mentation intact and speech normal  PSYCH: mentation appears normal, affect normal/bright  Claire was seen today for wellness visit and health maintenance.    Diagnoses and all orders for this visit:    Encounter for annual wellness visit (AWV) in Medicare patient    Hypertension goal BP (blood pressure) < 140/90    Other orders  -     REVIEW OF HEALTH MAINTENANCE PROTOCOL ORDERS      Continue all meds.  Recheck in 6 mos  Lower fat, higher fiber diet and consistent exercise.        8/5/2024   Mini Cog   Clock Draw Score 2 Normal   3 Item Recall 3 objects recalled   Mini Cog " Total Score 5                 Signed Electronically by: José Vora PA-C

## 2025-01-11 DIAGNOSIS — I10 HYPERTENSION GOAL BP (BLOOD PRESSURE) < 140/90: ICD-10-CM

## 2025-01-13 RX ORDER — LISINOPRIL 20 MG/1
20 TABLET ORAL 2 TIMES DAILY
Qty: 180 TABLET | Refills: 0 | Status: SHIPPED | OUTPATIENT
Start: 2025-01-13

## 2025-01-25 DIAGNOSIS — E03.8 OTHER SPECIFIED HYPOTHYROIDISM: ICD-10-CM

## 2025-01-26 RX ORDER — LEVOTHYROXINE SODIUM 88 UG/1
88 TABLET ORAL DAILY
Qty: 90 TABLET | Refills: 0 | Status: SHIPPED | OUTPATIENT
Start: 2025-01-26

## 2025-02-19 ENCOUNTER — TELEPHONE (OUTPATIENT)
Dept: FAMILY MEDICINE | Facility: CLINIC | Age: 85
End: 2025-02-19
Payer: COMMERCIAL

## 2025-02-19 NOTE — TELEPHONE ENCOUNTER
Patient calling, she does not think her gabapentin works at all for her. She stated her friend takes lamotrigine and she was wondering if that was an option for her?    RN advised a visit but patient stated if a visit is needed she will need to wait until her yearly in June due to transportation issues.     Thank you,  Beatris Loya RN

## 2025-02-24 NOTE — TELEPHONE ENCOUNTER
Patient calling for status. PCP has not yet reviewed.     RN stated it is likely better to discuss a virtual visit. She agreed to a TELEPHONE visit. RN scheduled in PCPs virtual spot 2/27/25.     Rizwana Couch RN on 2/24/2025 at 11:09 AM

## 2025-02-27 ENCOUNTER — VIRTUAL VISIT (OUTPATIENT)
Dept: FAMILY MEDICINE | Facility: CLINIC | Age: 85
End: 2025-02-27
Payer: COMMERCIAL

## 2025-02-27 DIAGNOSIS — G62.9 NEUROPATHY: ICD-10-CM

## 2025-02-27 RX ORDER — GABAPENTIN 100 MG/1
CAPSULE ORAL
Qty: 90 CAPSULE | Refills: 0 | Status: SHIPPED | OUTPATIENT
Start: 2025-02-27

## 2025-02-27 RX ORDER — PREGABALIN 25 MG/1
25 CAPSULE ORAL 2 TIMES DAILY
Qty: 180 CAPSULE | Refills: 0 | Status: SHIPPED | OUTPATIENT
Start: 2025-02-27

## 2025-02-27 NOTE — PROGRESS NOTES
"Claire is a 84 year old who is being evaluated via a billable telephone visit.    What phone number would you like to be contacted at? [phone]   How would you like to obtain your AVS? Mail a copy  Originating Location (pt. Location): Home    Distant Location (provider location):  On-site  Telephone visit completed due to the patient did not consent to a video visit.    Assessment & Plan   Problem List Items Addressed This Visit    None            BMI  Estimated body mass index is 27.8 kg/m  as calculated from the following:    Height as of 8/5/24: 1.575 m (5' 2\").    Weight as of 8/5/24: 68.9 kg (152 lb).   Weight management plan: Discussed healthy diet and exercise guidelines    Work on weight loss  Regular exercise      Subjective   Claire is a 84 year old, presenting for the following health issues:  Recheck Medication (Patient would like to discuss alternate medication for neuropathy )      2/27/2025    12:54 PM   Additional Questions   Roomed by virtual     HPI    Gabapentin hasn't proven helpful.    Notes primarily numbness in her feet. Occasional progression into her legs.       Review of Systems  Constitutional, HEENT, cardiovascular, pulmonary, GI, , musculoskeletal, neuro, skin, endocrine and psych systems are negative, except as otherwise noted.      Objective           Vitals:  No vitals were obtained today due to virtual visit.    Physical Exam   General: Alert and no distress //Respiratory: No audible wheeze, cough, or shortness of breath // Psychiatric:  Appropriate affect, tone, and pace of words      Claire was seen today for recheck medication.    Diagnoses and all orders for this visit:    Neuropathy  -     gabapentin (NEURONTIN) 100 MG capsule; Taper off of gabapentin: take 2 capsules tid x 10 days, then reduce dose to 1 capsule tid x 10 days, then stop.  -     pregabalin (LYRICA) 25 MG capsule; Take 1 capsule (25 mg) by mouth 2 times daily.    Other orders  -     REVIEW OF HEALTH MAINTENANCE " PROTOCOL ORDERS      Wean off gabapentin , then start lyrica.  Over the counter levo methylfolate with vitamin b12 and b 6       Phone call duration: 13 minutes  Signed Electronically by: José Vora PA-C

## 2025-03-18 DIAGNOSIS — I10 HYPERTENSION GOAL BP (BLOOD PRESSURE) < 140/90: ICD-10-CM

## 2025-03-18 RX ORDER — FELODIPINE 10 MG/1
10 TABLET, EXTENDED RELEASE ORAL DAILY
Qty: 90 TABLET | Refills: 0 | Status: SHIPPED | OUTPATIENT
Start: 2025-03-18

## 2025-03-18 RX ORDER — CARVEDILOL 12.5 MG/1
12.5 TABLET ORAL 2 TIMES DAILY WITH MEALS
Qty: 180 TABLET | Refills: 0 | Status: SHIPPED | OUTPATIENT
Start: 2025-03-18

## 2025-04-13 DIAGNOSIS — I10 HYPERTENSION GOAL BP (BLOOD PRESSURE) < 140/90: ICD-10-CM

## 2025-04-13 RX ORDER — LISINOPRIL 20 MG/1
20 TABLET ORAL 2 TIMES DAILY
Qty: 180 TABLET | Refills: 0 | Status: SHIPPED | OUTPATIENT
Start: 2025-04-13

## 2025-04-24 DIAGNOSIS — E03.8 OTHER SPECIFIED HYPOTHYROIDISM: ICD-10-CM

## 2025-04-24 RX ORDER — LEVOTHYROXINE SODIUM 88 UG/1
88 TABLET ORAL DAILY
Qty: 90 TABLET | Refills: 0 | Status: SHIPPED | OUTPATIENT
Start: 2025-04-24

## 2025-07-10 DIAGNOSIS — I10 HYPERTENSION GOAL BP (BLOOD PRESSURE) < 140/90: ICD-10-CM

## 2025-07-10 RX ORDER — LISINOPRIL 20 MG/1
20 TABLET ORAL 2 TIMES DAILY
Qty: 180 TABLET | Refills: 0 | Status: SHIPPED | OUTPATIENT
Start: 2025-07-10

## 2025-07-19 DIAGNOSIS — E03.8 OTHER SPECIFIED HYPOTHYROIDISM: ICD-10-CM

## 2025-07-20 RX ORDER — LEVOTHYROXINE SODIUM 88 UG/1
88 TABLET ORAL DAILY
Qty: 90 TABLET | Refills: 0 | Status: SHIPPED | OUTPATIENT
Start: 2025-07-20

## 2025-07-29 ENCOUNTER — LAB (OUTPATIENT)
Dept: LAB | Facility: CLINIC | Age: 85
End: 2025-07-29
Payer: COMMERCIAL

## 2025-07-29 DIAGNOSIS — I10 WHITE COAT SYNDROME WITH HYPERTENSION: Primary | ICD-10-CM

## 2025-07-29 DIAGNOSIS — Z13.6 SCREENING FOR CARDIOVASCULAR CONDITION: ICD-10-CM

## 2025-07-29 DIAGNOSIS — E78.5 HYPERLIPIDEMIA LDL GOAL <130: ICD-10-CM

## 2025-07-29 DIAGNOSIS — E03.8 OTHER SPECIFIED HYPOTHYROIDISM: ICD-10-CM

## 2025-07-29 LAB
ANION GAP SERPL CALCULATED.3IONS-SCNC: 10 MMOL/L (ref 7–15)
BUN SERPL-MCNC: 19.9 MG/DL (ref 8–23)
CALCIUM SERPL-MCNC: 10.1 MG/DL (ref 8.8–10.4)
CHLORIDE SERPL-SCNC: 100 MMOL/L (ref 98–107)
CHOLEST SERPL-MCNC: 158 MG/DL
CREAT SERPL-MCNC: 0.61 MG/DL (ref 0.51–0.95)
EGFRCR SERPLBLD CKD-EPI 2021: 86 ML/MIN/1.73M2
FASTING STATUS PATIENT QL REPORTED: YES
FASTING STATUS PATIENT QL REPORTED: YES
GLUCOSE SERPL-MCNC: 102 MG/DL (ref 70–99)
HCO3 SERPL-SCNC: 28 MMOL/L (ref 22–29)
HDLC SERPL-MCNC: 71 MG/DL
LDLC SERPL CALC-MCNC: 57 MG/DL
NONHDLC SERPL-MCNC: 87 MG/DL
POTASSIUM SERPL-SCNC: 5 MMOL/L (ref 3.4–5.3)
SODIUM SERPL-SCNC: 138 MMOL/L (ref 135–145)
TRIGL SERPL-MCNC: 148 MG/DL
TSH SERPL DL<=0.005 MIU/L-ACNC: 1.15 UIU/ML (ref 0.3–4.2)

## 2025-07-29 PROCEDURE — 36415 COLL VENOUS BLD VENIPUNCTURE: CPT

## 2025-07-29 PROCEDURE — 80061 LIPID PANEL: CPT

## 2025-07-29 PROCEDURE — 80048 BASIC METABOLIC PNL TOTAL CA: CPT

## 2025-07-29 PROCEDURE — 84443 ASSAY THYROID STIM HORMONE: CPT

## 2025-08-06 ENCOUNTER — OFFICE VISIT (OUTPATIENT)
Dept: FAMILY MEDICINE | Facility: CLINIC | Age: 85
End: 2025-08-06
Payer: COMMERCIAL

## 2025-08-06 VITALS
SYSTOLIC BLOOD PRESSURE: 134 MMHG | BODY MASS INDEX: 28 KG/M2 | WEIGHT: 158 LBS | OXYGEN SATURATION: 95 % | DIASTOLIC BLOOD PRESSURE: 74 MMHG | HEART RATE: 75 BPM | HEIGHT: 63 IN | RESPIRATION RATE: 20 BRPM | TEMPERATURE: 97.9 F

## 2025-08-06 DIAGNOSIS — E03.8 OTHER SPECIFIED HYPOTHYROIDISM: ICD-10-CM

## 2025-08-06 DIAGNOSIS — E78.5 HYPERLIPIDEMIA LDL GOAL <130: ICD-10-CM

## 2025-08-06 DIAGNOSIS — Z00.00 ENCOUNTER FOR ADULT WELLNESS VISIT: Primary | ICD-10-CM

## 2025-08-06 DIAGNOSIS — I10 HYPERTENSION GOAL BP (BLOOD PRESSURE) < 140/90: ICD-10-CM

## 2025-08-06 RX ORDER — SIMVASTATIN 20 MG
20 TABLET ORAL DAILY
Qty: 90 TABLET | Refills: 1 | Status: SHIPPED | OUTPATIENT
Start: 2025-08-06

## 2025-08-06 RX ORDER — FELODIPINE 10 MG/1
10 TABLET, EXTENDED RELEASE ORAL DAILY
Qty: 90 TABLET | Refills: 1 | Status: SHIPPED | OUTPATIENT
Start: 2025-08-06

## 2025-08-06 RX ORDER — CARVEDILOL 12.5 MG/1
12.5 TABLET ORAL 2 TIMES DAILY WITH MEALS
Qty: 180 TABLET | Refills: 1 | Status: SHIPPED | OUTPATIENT
Start: 2025-08-06

## 2025-08-06 SDOH — HEALTH STABILITY: PHYSICAL HEALTH: ON AVERAGE, HOW MANY DAYS PER WEEK DO YOU ENGAGE IN MODERATE TO STRENUOUS EXERCISE (LIKE A BRISK WALK)?: 0 DAYS

## 2025-08-06 ASSESSMENT — SOCIAL DETERMINANTS OF HEALTH (SDOH): HOW OFTEN DO YOU GET TOGETHER WITH FRIENDS OR RELATIVES?: MORE THAN THREE TIMES A WEEK

## 2025-08-06 ASSESSMENT — PAIN SCALES - GENERAL: PAINLEVEL_OUTOF10: NO PAIN (0)

## (undated) DEVICE — SU VICRYL 2-0 CT-2 27" J333H

## (undated) DEVICE — NDL INSUFFLATION 120MM VERRES 172015

## (undated) DEVICE — NDL 19GA 1.5"

## (undated) DEVICE — DEVICE SUTURE GRASPER TROCAR CLOSURE 14GAX15CM PMI-TC-SG

## (undated) DEVICE — SYR 05ML LL W/O NDL

## (undated) DEVICE — DECANTER VIAL 2006S

## (undated) DEVICE — SUCTION IRR TRUMPET VALVE LAP ASU1201

## (undated) DEVICE — Device

## (undated) DEVICE — CATH INTERMITTENT CLEAN-CATH 10FR 16" VINYL LF 421710

## (undated) DEVICE — PREP CHLORAPREP 26ML TINTED ORANGE  260815

## (undated) DEVICE — ENDO CANNULA FIXATION OPTICAL 05MM VERSAPORT PLUS ONBFCA5ST

## (undated) DEVICE — GLOVE PROTEXIS W/NEU-THERA 6.5  2D73TE65

## (undated) DEVICE — PREP SKIN SCRUB TRAY 4461A

## (undated) DEVICE — STOCKING SLEEVE COMPRESSION CALF LG

## (undated) DEVICE — STPL ENDO RELOAD GIA 30X2.5MM 030805

## (undated) DEVICE — SU DERMABOND DHV12

## (undated) DEVICE — SUCTION TIP YANKAUER W/O VENT K86

## (undated) DEVICE — ENDO TROCAR 12MM VERSAONE BLADELESS W/STD FIX CAN NONB12STF

## (undated) DEVICE — ENDO POUCH GOLD 10MM ECATCH 173050G

## (undated) DEVICE — DECANTER TRANSFER DEVICE 2008S

## (undated) DEVICE — STPL ENDO GIA 30X2.5MM 030811

## (undated) DEVICE — SU VICRYL 0 TIE 54" UND J287G

## (undated) DEVICE — GLOVE PROTEXIS W/NEU-THERA 7.0  2D73TE70

## (undated) DEVICE — CATH FOLEY 16FR 5ML SIL 165816

## (undated) DEVICE — ENDO CANNULA 05MM VERSAONE UNIVERSAL UNVCA5STF

## (undated) DEVICE — SOL WATER IRRIG 1000ML BOTTLE 07139-09

## (undated) DEVICE — SOL NACL 0.9% IRRIG 1000ML BOTTLE 07138-09

## (undated) DEVICE — SOL WATER INJ 2000ML BAG 07118-07

## (undated) DEVICE — TUBING IRRIG TUR Y TYPE 96" LF 6543-01

## (undated) DEVICE — PAD PERI W/WINGS 1580A

## (undated) DEVICE — CATH TRAY FOLEY 16FR DRAINAGE BAG STATLOCK 899916

## (undated) DEVICE — GLOVE PROTEXIS BLUE W/NEU-THERA 7.0  2D73EB70

## (undated) DEVICE — ESU ELEC BLADE 2.75" COATED/INSULATED E1455

## (undated) DEVICE — GLOVE PROTEXIS BLUE W/NEU-THERA 6.5  2D73EB65

## (undated) DEVICE — ADHESIVE SWIFTSET 0.8ML OCTYL SS6

## (undated) DEVICE — ESU HOLDER LAP INST DISP PURPLE LONG 330MM H-PRO-330

## (undated) DEVICE — TUBING SUCTION 10'X3/16" N510

## (undated) DEVICE — SU VICRYL 4-0 FS-2 27" J422-H

## (undated) DEVICE — GOWN LG DISP 9515

## (undated) DEVICE — SYR 10ML LL W/O NDL

## (undated) DEVICE — PACK MINOR SBA15MIFSE

## (undated) DEVICE — SOL NACL 0.9% INJ 1000ML BAG 07983-09

## (undated) DEVICE — DRAPE GYN/UROLOGY FLUID POUCH TUR 29455

## (undated) DEVICE — SU VICRYL 3-0 SH 27" UND J416H

## (undated) RX ORDER — DEXAMETHASONE SODIUM PHOSPHATE 4 MG/ML
INJECTION, SOLUTION INTRA-ARTICULAR; INTRALESIONAL; INTRAMUSCULAR; INTRAVENOUS; SOFT TISSUE
Status: DISPENSED
Start: 2018-02-08

## (undated) RX ORDER — BUPIVACAINE HYDROCHLORIDE AND EPINEPHRINE 5; 5 MG/ML; UG/ML
INJECTION, SOLUTION EPIDURAL; INTRACAUDAL; PERINEURAL
Status: DISPENSED
Start: 2018-02-08

## (undated) RX ORDER — PROPOFOL 10 MG/ML
INJECTION, EMULSION INTRAVENOUS
Status: DISPENSED
Start: 2018-02-08

## (undated) RX ORDER — GLYCOPYRROLATE 0.2 MG/ML
INJECTION, SOLUTION INTRAMUSCULAR; INTRAVENOUS
Status: DISPENSED
Start: 2018-02-08

## (undated) RX ORDER — FENTANYL CITRATE 50 UG/ML
INJECTION, SOLUTION INTRAMUSCULAR; INTRAVENOUS
Status: DISPENSED
Start: 2018-02-08

## (undated) RX ORDER — ONDANSETRON 2 MG/ML
INJECTION INTRAMUSCULAR; INTRAVENOUS
Status: DISPENSED
Start: 2018-02-08

## (undated) RX ORDER — PHENYLEPHRINE HCL IN 0.9% NACL 1 MG/10 ML
SYRINGE (ML) INTRAVENOUS
Status: DISPENSED
Start: 2018-02-08

## (undated) RX ORDER — KETOROLAC TROMETHAMINE 30 MG/ML
INJECTION, SOLUTION INTRAMUSCULAR; INTRAVENOUS
Status: DISPENSED
Start: 2018-02-08

## (undated) RX ORDER — NEOSTIGMINE METHYLSULFATE 1 MG/ML
VIAL (ML) INJECTION
Status: DISPENSED
Start: 2018-02-08

## (undated) RX ORDER — LIDOCAINE HYDROCHLORIDE 10 MG/ML
INJECTION, SOLUTION EPIDURAL; INFILTRATION; INTRACAUDAL; PERINEURAL
Status: DISPENSED
Start: 2018-02-08